# Patient Record
Sex: FEMALE | HISPANIC OR LATINO | Employment: UNEMPLOYED | URBAN - METROPOLITAN AREA
[De-identification: names, ages, dates, MRNs, and addresses within clinical notes are randomized per-mention and may not be internally consistent; named-entity substitution may affect disease eponyms.]

---

## 2017-04-05 DIAGNOSIS — Z01.812 PRE-OPERATIVE LABORATORY EXAMINATION: ICD-10-CM

## 2017-04-05 LAB
ANION GAP SERPL CALC-SCNC: 7 MMOL/L (ref 0–11.9)
BUN SERPL-MCNC: 15 MG/DL (ref 8–22)
CALCIUM SERPL-MCNC: 9.2 MG/DL (ref 8.5–10.5)
CHLORIDE SERPL-SCNC: 105 MMOL/L (ref 96–112)
CO2 SERPL-SCNC: 24 MMOL/L (ref 20–33)
CREAT SERPL-MCNC: 0.74 MG/DL (ref 0.5–1.4)
ERYTHROCYTE [DISTWIDTH] IN BLOOD BY AUTOMATED COUNT: 43.4 FL (ref 35.9–50)
GFR SERPL CREATININE-BSD FRML MDRD: >60 ML/MIN/1.73 M 2
GLUCOSE SERPL-MCNC: 188 MG/DL (ref 65–99)
HCT VFR BLD AUTO: 42.3 % (ref 37–47)
HGB BLD-MCNC: 13.1 G/DL (ref 12–16)
MCH RBC QN AUTO: 25.5 PG (ref 27–33)
MCHC RBC AUTO-ENTMCNC: 31 G/DL (ref 33.6–35)
MCV RBC AUTO: 82.3 FL (ref 81.4–97.8)
PLATELET # BLD AUTO: 231 K/UL (ref 164–446)
PMV BLD AUTO: 11.4 FL (ref 9–12.9)
POTASSIUM SERPL-SCNC: 3.7 MMOL/L (ref 3.6–5.5)
RBC # BLD AUTO: 5.14 M/UL (ref 4.2–5.4)
SODIUM SERPL-SCNC: 136 MMOL/L (ref 135–145)
WBC # BLD AUTO: 8.2 K/UL (ref 4.8–10.8)

## 2017-04-05 PROCEDURE — 80048 BASIC METABOLIC PNL TOTAL CA: CPT

## 2017-04-05 PROCEDURE — 36415 COLL VENOUS BLD VENIPUNCTURE: CPT

## 2017-04-05 PROCEDURE — 85027 COMPLETE CBC AUTOMATED: CPT

## 2017-04-05 RX ORDER — NIFEDIPINE 30 MG/1
30 TABLET, EXTENDED RELEASE ORAL EVERY EVENING
COMMUNITY
End: 2018-02-14

## 2017-04-06 NOTE — H&P
DATE OF SCHEDULED SURGERY:  2017    REASON FOR SURGERY:  Recurrent pelvic floor relaxation, pelvic pain, type 2   stress urinary incontinence as demonstrated by urodynamic studies along with   dyspareunia and type 2 stress urinary incontinence recurrence.    HISTORY OF PRESENT ILLNESS:  This is a 41-year-old woman  5, para 5, 5   previous vaginal deliveries, the largest infant 9 pounds 8 ounces with near   complete uterovaginal prolapse, previously undergone a pelvic floor repair,   now with recurrent pelvic floor relaxation and recurrent stress urinary   incontinence.  Patient postoperatively after her previous procedure had   significant constipation, pelvic pain bearing down and had a recurrence   shortly after surgery.  Expected management and pelvic floor exercises were   encouraged and the patient proceeded forward with this approach for a period   of time; however, now states that she is no longer interested in proceeding   forward with conservative measures.  She has declined pessary therapy, any   other pelvic floor exercises or biofeedback therapy and other conservative   measures, now interested in proceeding forward with attempted definitive   surgical correction with an anterior and posterior vaginal repair, enterocele   repair, perineoplasty, and transobturator tape midurethral sling procedure and   a probable sacrospinous vault suspension.  Risks, benefits, alternatives of   all individual procedures were addressed with the patient in detail over   several visits.  She has asked appropriate questions, signed the appropriate   consents and wishes to proceed forward with surgery as planned.    PAST MEDICAL HISTORY:  Adult onset diabetes mellitus, chronic hypertension,   otherwise as stated above.    PAST SURGICAL HISTORY:  As above.    OBSTETRICAL HISTORY:  Five previous vaginal deliveries between  and .    GYNECOLOGIC HISTORY:  The patient has been amenorrheic since her previous    surgery.  She reports significant pelvic pain, dyspareunia, which she   attributes to large bulge at the vaginal introitus in association with mixed   urinary incontinence symptoms.  She has been counseled regarding risks,   benefits, and alternatives of proceeding forward with the surgery above.  She   does understand her pelvic pain, dyspareunia in addition to her urinary   incontinence, especially the overactive bladder component, maybe unimproved   with surgery as described above, may need additional medical or surgical   management.  Her pelvic pain, dyspareunia may actually worsen with the surgery   as described above.  She is aware of these concerns and is interested in   proceeding forward with surgery nonetheless.    SOCIAL HISTORY:  She is a social drinker.  She denies use of any tobacco.  She   denies use of any other drug use.  She is .  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    MEDICATIONS:  Iron sulfate 325 mg daily, losartan 100 mg p.o. daily, metformin   1000 mg twice daily, omeprazole 20 mg p.o. daily.    ALLERGIES:  No known drug allergies.    PHYSICAL EXAMINATION:  VITAL SIGNS:  She is afebrile, hemodynamically stable.  HEART:  Regular rate and rhythm.  CHEST:  Clear to auscultation bilaterally.  ABDOMEN:  Soft, nondistended, nontender, although the exam was difficult   secondary to the patient's increased body mass index.  PELVIC:  Shows normal external female genitalia.  Grade II anterior and   posterior vaginal relaxation with a grade II apical vaginal relaxation as   well.  She does have tenderness to palpation at the vaginal cuff.  No adnexal   masses or tenderness to palpation were appreciated.  EXTREMITIES:  Nontender.    LABORATORY STUDIES:  Urodynamic studies confirmed type 2 stress urinary   incontinence.    ASSESSMENT AND PLAN:  A 41-year-old woman  5, para 5 with symptomatic   pelvic floor relaxation, dyspareunia, pelvic pain, mixed urinary incontinence   symptoms,  interested in proceeding forward with surgery as described above.    The risks, benefits, alternatives of all individual procedures were addressed   with the patient in detail over several visits.  She has asked appropriate   questions, signed the appropriate consents and is wishing to proceed forward   with surgery as planned.       ____________________________________     MD SUSY OROURKE / LILLIAN    DD:  04/06/2017 08:21:21  DT:  04/06/2017 08:57:18    D#:  [id number]  Job#:  [id number]

## 2017-04-11 ENCOUNTER — HOSPITAL ENCOUNTER (OUTPATIENT)
Facility: MEDICAL CENTER | Age: 42
End: 2017-04-11
Attending: SPECIALIST | Admitting: SPECIALIST
Payer: MEDICAID

## 2017-04-11 VITALS
BODY MASS INDEX: 35.26 KG/M2 | WEIGHT: 211.64 LBS | DIASTOLIC BLOOD PRESSURE: 89 MMHG | SYSTOLIC BLOOD PRESSURE: 148 MMHG | TEMPERATURE: 98.1 F | HEIGHT: 65 IN | RESPIRATION RATE: 16 BRPM | HEART RATE: 67 BPM | OXYGEN SATURATION: 94 %

## 2017-04-11 PROBLEM — N81.6 RECTOCELE: Status: ACTIVE | Noted: 2017-04-11

## 2017-04-11 PROBLEM — N39.3 FEMALE STRESS INCONTINENCE: Status: ACTIVE | Noted: 2017-04-11

## 2017-04-11 PROBLEM — N81.11 CYSTOCELE, MIDLINE: Status: ACTIVE | Noted: 2017-04-11

## 2017-04-11 PROBLEM — R10.2 ADNEXAL TENDERNESS, RIGHT: Status: ACTIVE | Noted: 2017-04-11

## 2017-04-11 LAB — GLUCOSE BLD-MCNC: 112 MG/DL (ref 65–99)

## 2017-04-11 PROCEDURE — 160009 HCHG ANES TIME/MIN: Performed by: SPECIALIST

## 2017-04-11 PROCEDURE — 160002 HCHG RECOVERY MINUTES (STAT): Performed by: SPECIALIST

## 2017-04-11 PROCEDURE — 500892 HCHG PACK, PERI-GYN: Performed by: SPECIALIST

## 2017-04-11 PROCEDURE — 160048 HCHG OR STATISTICAL LEVEL 1-5: Performed by: SPECIALIST

## 2017-04-11 PROCEDURE — A4606 OXYGEN PROBE USED W OXIMETER: HCPCS | Performed by: SPECIALIST

## 2017-04-11 PROCEDURE — 110371 HCHG SHELL REV 272: Performed by: SPECIALIST

## 2017-04-11 PROCEDURE — A9270 NON-COVERED ITEM OR SERVICE: HCPCS

## 2017-04-11 PROCEDURE — 110382 HCHG SHELL REV 271: Performed by: SPECIALIST

## 2017-04-11 PROCEDURE — 501516 HCHG SUTURE, CAPIO: Performed by: SPECIALIST

## 2017-04-11 PROCEDURE — 700101 HCHG RX REV CODE 250

## 2017-04-11 PROCEDURE — 160041 HCHG SURGERY MINUTES - EA ADDL 1 MIN LEVEL 4: Performed by: SPECIALIST

## 2017-04-11 PROCEDURE — 160036 HCHG PACU - EA ADDL 30 MINS PHASE I: Performed by: SPECIALIST

## 2017-04-11 PROCEDURE — 501330 HCHG SET, CYSTO IRRIG TUBING: Performed by: SPECIALIST

## 2017-04-11 PROCEDURE — C1771 REP DEV, URINARY, W/SLING: HCPCS | Performed by: SPECIALIST

## 2017-04-11 PROCEDURE — A4338 INDWELLING CATHETER LATEX: HCPCS | Performed by: SPECIALIST

## 2017-04-11 PROCEDURE — 700102 HCHG RX REV CODE 250 W/ 637 OVERRIDE(OP)

## 2017-04-11 PROCEDURE — 501838 HCHG SUTURE GENERAL: Performed by: SPECIALIST

## 2017-04-11 PROCEDURE — 160035 HCHG PACU - 1ST 60 MINS PHASE I: Performed by: SPECIALIST

## 2017-04-11 PROCEDURE — 700111 HCHG RX REV CODE 636 W/ 250 OVERRIDE (IP)

## 2017-04-11 PROCEDURE — 82962 GLUCOSE BLOOD TEST: CPT

## 2017-04-11 PROCEDURE — 160029 HCHG SURGERY MINUTES - 1ST 30 MINS LEVEL 4: Performed by: SPECIALIST

## 2017-04-11 PROCEDURE — 502240 HCHG MISC OR SUPPLY RC 0272: Performed by: SPECIALIST

## 2017-04-11 DEVICE — SLING TOT OBTRYX I HALO: Type: IMPLANTABLE DEVICE | Status: FUNCTIONAL

## 2017-04-11 RX ORDER — ACETAMINOPHEN 500 MG
1000 TABLET ORAL EVERY 6 HOURS
Status: DISCONTINUED | OUTPATIENT
Start: 2017-04-11 | End: 2017-04-11 | Stop reason: HOSPADM

## 2017-04-11 RX ORDER — BUPIVACAINE HYDROCHLORIDE AND EPINEPHRINE 2.5; 5 MG/ML; UG/ML
INJECTION, SOLUTION INFILTRATION; PERINEURAL
Status: DISCONTINUED | OUTPATIENT
Start: 2017-04-11 | End: 2017-04-11 | Stop reason: HOSPADM

## 2017-04-11 RX ORDER — BUPIVACAINE HYDROCHLORIDE AND EPINEPHRINE 2.5; 5 MG/ML; UG/ML
INJECTION, SOLUTION EPIDURAL; INFILTRATION; INTRACAUDAL; PERINEURAL
Status: DISCONTINUED
Start: 2017-04-11 | End: 2017-04-11 | Stop reason: HOSPADM

## 2017-04-11 RX ORDER — SODIUM CHLORIDE, SODIUM LACTATE, POTASSIUM CHLORIDE, CALCIUM CHLORIDE 600; 310; 30; 20 MG/100ML; MG/100ML; MG/100ML; MG/100ML
1000 INJECTION, SOLUTION INTRAVENOUS
Status: COMPLETED | OUTPATIENT
Start: 2017-04-11 | End: 2017-04-11

## 2017-04-11 RX ORDER — SIMETHICONE 80 MG
80 TABLET,CHEWABLE ORAL EVERY 8 HOURS PRN
Status: DISCONTINUED | OUTPATIENT
Start: 2017-04-11 | End: 2017-04-11 | Stop reason: HOSPADM

## 2017-04-11 RX ORDER — ONDANSETRON 2 MG/ML
4 INJECTION INTRAMUSCULAR; INTRAVENOUS EVERY 6 HOURS PRN
Status: DISCONTINUED | OUTPATIENT
Start: 2017-04-11 | End: 2017-04-11 | Stop reason: HOSPADM

## 2017-04-11 RX ORDER — OXYCODONE HYDROCHLORIDE 5 MG/1
10 TABLET ORAL
Status: DISCONTINUED | OUTPATIENT
Start: 2017-04-11 | End: 2017-04-11 | Stop reason: HOSPADM

## 2017-04-11 RX ORDER — METOCLOPRAMIDE HYDROCHLORIDE 5 MG/ML
10 INJECTION INTRAMUSCULAR; INTRAVENOUS EVERY 4 HOURS PRN
Status: DISCONTINUED | OUTPATIENT
Start: 2017-04-11 | End: 2017-04-11 | Stop reason: HOSPADM

## 2017-04-11 RX ORDER — OXYCODONE HCL 5 MG/5 ML
SOLUTION, ORAL ORAL
Status: COMPLETED
Start: 2017-04-11 | End: 2017-04-11

## 2017-04-11 RX ADMIN — OXYCODONE HYDROCHLORIDE 10 MG: 5 SOLUTION ORAL at 14:29

## 2017-04-11 RX ADMIN — SODIUM CHLORIDE, SODIUM LACTATE, POTASSIUM CHLORIDE, CALCIUM CHLORIDE 1000 ML: 600; 310; 30; 20 INJECTION, SOLUTION INTRAVENOUS at 11:50

## 2017-04-11 ASSESSMENT — PAIN SCALES - GENERAL
PAINLEVEL_OUTOF10: 0
PAINLEVEL_OUTOF10: 3
PAINLEVEL_OUTOF10: 2
PAINLEVEL_OUTOF10: 0
PAINLEVEL_OUTOF10: 2
PAINLEVEL_OUTOF10: 0
PAINLEVEL_OUTOF10: 0

## 2017-04-11 NOTE — DISCHARGE INSTRUCTIONS
ACTIVITY: Rest and take it easy for the first 24 hours.  A responsible adult is recommended to remain with you during that time.  It is normal to feel sleepy.  We encourage you to not do anything that requires balance, judgment or coordination.    MILD FLU-LIKE SYMPTOMS ARE NORMAL. YOU MAY EXPERIENCE GENERALIZED MUSCLE ACHES, THROAT IRRITATION, HEADACHE AND/OR SOME NAUSEA.    FOR 24 HOURS DO NOT:  Drive, operate machinery or run household appliances.  Drink beer or alcoholic beverages.   Make important decisions or sign legal documents.    SPECIAL INSTRUCTIONS: pelvic rest (no douching, sexual intercourse or tampons) until cleared by MD.    DIET: To avoid nausea, slowly advance diet as tolerated, avoiding spicy or greasy foods for the first day.  Add more substantial food to your diet according to your physician's instructions.  Babies can be fed formula or breast milk as soon as they are hungry.  INCREASE FLUIDS AND FIBER TO AVOID CONSTIPATION.    SURGICAL DRESSING/BATHING: may shower tomorrow, no hot tubs, baths or swimming.     FOLLOW-UP APPOINTMENT:  A follow-up appointment should be arranged with your doctor, call to schedule.    You should CALL YOUR PHYSICIAN if you develop:  Fever greater than 101 degrees F.  Pain not relieved by medication, or persistent nausea or vomiting.  Excessive bleeding (blood soaking through dressing) or unexpected drainage from the wound.  Extreme redness or swelling around the incision site, drainage of pus or foul smelling drainage.  Inability to urinate or empty your bladder within 8 hours.  Problems with breathing or chest pain.    You should call 911 if you develop problems with breathing or chest pain.  If you are unable to contact your doctor or surgical center, you should go to the nearest emergency room or urgent care center.  Physician's telephone #: [phone]    If any questions arise, call your doctor.  If your doctor is not available, please feel free to call the  Surgical Center at [phone].  The Center is open Monday through Friday from 7AM to 7PM.  You can also call the HEALTH HOTLINE open 24 hours/day, 7 days/week and speak to a nurse at [phone], or toll free at [phone].    A registered nurse may call you a few days after your surgery to see how you are doing after your procedure.    MEDICATIONS: Resume taking daily medication.  Take prescribed pain medication with food.  If no medication is prescribed, you may take non-aspirin pain medication if needed.  PAIN MEDICATION CAN BE VERY CONSTIPATING.  Take a stool softener or laxative such as senokot, pericolace, or milk of magnesia if needed.    Prescription given for has a home.  Last pain medication given at 2:30.  Next dose due tonight at 6:30 if needed.    If your physician has prescribed pain medication that includes Acetaminophen (Tylenol), do not take additional Acetaminophen (Tylenol) while taking the prescribed medication.    Depression / Suicide Risk    As you are discharged from this [facility] facility, it is important to learn how to keep safe from harming yourself.    Recognize the warning signs:  · Abrupt changes in personality, positive or negative- including increase in energy   · Giving away possessions  · Change in eating patterns- significant weight changes-  positive or negative  · Change in sleeping patterns- unable to sleep or sleeping all the time   · Unwillingness or inability to communicate  · Depression  · Unusual sadness, discouragement and loneliness  · Talk of wanting to die  · Neglect of personal appearance   · Rebelliousness- reckless behavior  · Withdrawal from people/activities they love  · Confusion- inability to concentrate     If you or a loved one observes any of these behaviors or has concerns about self-harm, here's what you can do:  · Talk about it- your feelings and reasons for harming yourself  · Remove any means that you might use to hurt yourself (examples:  pills, rope, extension cords, firearm)  · Get professional help from the community (Mental Health, Substance Abuse, psychological counseling)  · Do not be alone:Call your Safe Contact- someone whom you trust who will be there for you.  · Call your local CRISIS HOTLINE 978-6487 or 243-317-7696  · Call your local Children's Mobile Crisis Response Team [place] (481) 848-1272 or www.[employer]  · Call the toll free National Suicide Prevention Hotlines   · National Suicide Prevention Lifeline 161-857-STYQ (7768)  · National Hope Line Network 800-SUICIDE (837-5902)

## 2017-04-11 NOTE — OP REPORT
DATE OF SERVICE:  4/11/2017     PREOPERATIVE DIAGNOSES:  Recurrent symptomatic pelvic floor relaxation and type II stress urinary incontinence with pelvic pain and dyspareunia which she attributes to her pelvic floor relaxation     POSTOPERATIVE DIAGNOSES: same     PROCEDURES PERFORMED:  Anterior and posterior vaginal repair, enterocele    repair, sacrospinous vault suspension, transobturator tape midurethral sling    procedure, cystoscopy.     SURGEON:  Angel Hernadez MD     ASSISTANT:  Tim Matt MD     ANESTHESIA:  General     ANESTHESIOLOGIST:  Anesthesiologist: Jere Basilio M.D.     ESTIMATED BLOOD LOSS FOR THE PROCEDURE:  less than 15 mL.     FINDINGS:  Good support of the anterior and posterior vaginal walls in    addition to the apical vaginal tissue without any undue tension in the suture    sites.  Cystoscopy revealed bilateral ureteral efflux and normal bladder and    no undue tension noted at the level of the mid urethra after sling placement    on cystoscopic evaluation.     DESCRIPTION OF PROCEDURE:  The patient was taken to the operating room where    general anesthesia was performed without difficulty.  Patient was then prepped   and draped in usual sterile fashion.  Lower extremities placed in Michael    stirrups.  Attention was first turned to the perineum where a weighted    speculum was placed with the use of Bardales retractor.  Anterior vaginal mucosa    was then injected with 10 mL of 0.25% Marcaine with epinephrine.  The vaginal    mucosa was then dissected off the underlying pubocervical fascia candis until    the levator muscles were then reached.  Her previous transobturator tape sling   was resected followed by placement of horizontal mattress sutures    reapproximating the pubocervical fascia in midline in a double layer closure,    thereby reducing the midline cystocele followed by injection of 10 mL of 0.25%   Marcaine with epinephrine lateral to the clitoris in the labial crural folds     followed by stab incision made with the use of 11 blade scalpel in this    location on each side followed by placement of the respective Obtryx halo    needle through the labial crural incision sites to the obturator membranes    through the endopelvic fascia out through the vaginal mucosal incision at the    level of the mid urethra.  The sling was then applied to the Obtryx halo    needle.  Needle was then taken back up through the original tract.  Tensioning   of position was then performed.  Dumont catheter was removed, which had been    placed at the beginning of the case for placement of a 30-degree cystoscope,    which revealed normal urinary bladder, bilateral ureteral efflux and no undue    tension noted at the level of the mid urethra.  The sling was then released,    tensioning of position was then finalized under direct cystoscopic evaluation.    Cystoscope removed.  Dumont catheter replaced.  All excess vaginal mucosa and   sling material were then excised.  The vaginal mucosa was then reapproximated   with 2-0 Vicryl in a running interlocking fashion in one segment.  Posterior    vaginal mucosa was then injected with 10 mL of 0.25% Marcaine with    epinephrine.  The vaginal mucosa was then dissected off the underlying    rectovaginal fascia candis until the levator muscles were then reached.     Horizontal mattress sutures were then used to reapproximate the rectovaginal    fascia in the midline in a double 0 closure, thereby reducing the midline    rectocele.  A large enterocele was located at the superior aspect of the    dissection.  Dissection of the sacrospinous process was then performed in the    ischial spine, 3 cm medial along the sacrospinous ligament with straight    catheterization needle device, 0 Ethibond suture was taken through the    sacrospinous ligament taken out through the right apical portion of the    vaginal cuff and the overlying vaginal mucosa.  Once tied down, there was  good   reapproximation of the apex of the vaginal vault to the sacrospinous    ligament.  The rectovaginal septum was then reapproximated in the posterior    aspect of the vaginal cuff in a double pursestring suture, thereby reducing    the large enterocele located at the superior aspect of the dissection.  All    excess vaginal mucosa was then trimmed.  The vaginal mucosa was then    reapproximated with 2-0 Vicryl in running locking fashion in one segment    performing a perineoplasty on the perineum.  The patient tolerated procedure    well and was awoken from general anesthesia, was taken to the recovery in    stable condition.        ____________________________________     ISIDORO MURILLO MD

## 2017-04-11 NOTE — OR NURSING
1416  report received from Dr. Basilio. respirations spontaneous and non-labored.To PACU from OR via gurney, sagsatume catheter to DD, clear yellow output, marlee pad in place, scant bloody drainage.     1429: patient complains of minimal pain. Tolerated po oxycodone and sips of water.    1432: patients daughter to recovery room.    1450: patient resting comfortably, No change in surgical site assessment. Tolerating po water.    1505: discontinued sagastume catheter without incident. Bladder challenge 300 sterile water, patient voided 300ml pink urine without incident. Minimal blood on peripad. Patient denies pain or nausea. Assisted with dressing and into chair.    1530: patient had slight nausea which was relieved with quezease and crackers. Discharge instructions reviewed with patient and her daughter. Patient steady gait to exit with all belongings.

## 2017-04-11 NOTE — IP AVS SNAPSHOT
4/11/2017          Tanya Mahmood  [address] [place] [address]    Dear Tanya:    [facility] wants to ensure your discharge home is safe and you or your loved ones have had all your questions answered regarding your care after you leave the hospital.    You may receive a telephone call within two days of your discharge.  This call is to make certain you understand your discharge instructions as well as ensure we provided you with the best care possible during your stay with us.     The call will only last approximately 3-5 minutes and will be done by a nurse.    Once again, we want to ensure your discharge home is safe and that you have a clear understanding of any next steps in your care.  If you have any questions or concerns, please do not hesitate to contact us, we are here for you.  Thank you for choosing [facility] for your healthcare needs.    Sincerely,    Jefe Peña    [facility]

## 2017-04-11 NOTE — OR SURGEON
Immediate Post-Operative Note      PreOp Diagnosis: Recurrent symptomatic pelvic floor relaxation and type II stress urinary incontinence with pelvic pain and dyspareunia which she attributes to her pelvic floor relaxation    PostOp Diagnosis: same    Procedure(s):  ANTERIOR AND POSTERIOR REPAIR - Wound Class: Clean Contaminated  ENTEROCELE REPAIR- PERINEOPLASTY - Wound Class: Clean Contaminated  BLADDER SLING FEMALE-TOT - Wound Class: Clean Contaminated  VAGINAL SUSPENSION-SACROSPINOUS VAULT - Wound Class: Clean Contaminated    Surgeon(s):  CHARI Norris M.D.    Anesthesiologist/Type of Anesthesia:  Anesthesiologist: Jree Basilio M.D./General    Surgical Staff:  Circulator: Ashley Gaytan R.N.  Relief Scrub: Vandana Mathur  Scrub Person: Danette Gonzalez    Specimen: none    Estimated Blood Loss: less than 15ccs    Findings: Good support of the anterior and posterior and apical vaginal tissue without any undue tension at any suture sites, cystoscopy revealed bilateral ureteral efflux, normal bladder and no undue tension at the mid urethra after sling placement on cystoscopy    Complications: none        4/11/2017 2:29 PM Angel Hernadez

## 2017-04-11 NOTE — IP AVS SNAPSHOT
" Home Care Instructions                                                                                                                Name:Tanya Mahmood  Medical Record Number:[id number]  CSN: [id number]    YOB: 1975   Age: 41 y.o.  Sex: female  HT:1.651 m (5' 5\") WT: 96 kg (211 lb 10.3 oz)          Admit Date: 4/11/2017     Discharge Date:   Today's Date: 4/11/2017  Attending Doctor:  Angel Hernadez M.D.                  Allergies:  Metformin                Discharge Instructions         ACTIVITY: Rest and take it easy for the first 24 hours.  A responsible adult is recommended to remain with you during that time.  It is normal to feel sleepy.  We encourage you to not do anything that requires balance, judgment or coordination.    MILD FLU-LIKE SYMPTOMS ARE NORMAL. YOU MAY EXPERIENCE GENERALIZED MUSCLE ACHES, THROAT IRRITATION, HEADACHE AND/OR SOME NAUSEA.    FOR 24 HOURS DO NOT:  Drive, operate machinery or run household appliances.  Drink beer or alcoholic beverages.   Make important decisions or sign legal documents.    SPECIAL INSTRUCTIONS: pelvic rest (no douching, sexual intercourse or tampons) until cleared by MD.    DIET: To avoid nausea, slowly advance diet as tolerated, avoiding spicy or greasy foods for the first day.  Add more substantial food to your diet according to your physician's instructions.  Babies can be fed formula or breast milk as soon as they are hungry.  INCREASE FLUIDS AND FIBER TO AVOID CONSTIPATION.    SURGICAL DRESSING/BATHING: may shower tomorrow, no hot tubs, baths or swimming.     FOLLOW-UP APPOINTMENT:  A follow-up appointment should be arranged with your doctor, call to schedule.    You should CALL YOUR PHYSICIAN if you develop:  Fever greater than 101 degrees F.  Pain not relieved by medication, or persistent nausea or vomiting.  Excessive bleeding (blood soaking through dressing) or unexpected drainage from the wound.  Extreme redness or swelling around " the incision site, drainage of pus or foul smelling drainage.  Inability to urinate or empty your bladder within 8 hours.  Problems with breathing or chest pain.    You should call 911 if you develop problems with breathing or chest pain.  If you are unable to contact your doctor or surgical center, you should go to the nearest emergency room or urgent care center.  Physician's telephone #: [phone]    If any questions arise, call your doctor.  If your doctor is not available, please feel free to call the Surgical Center at [phone].  The Center is open Monday through Friday from 7AM to 7PM.  You can also call the HEALTH HOTLINE open 24 hours/day, 7 days/week and speak to a nurse at [phone], or toll free at [phone].    A registered nurse may call you a few days after your surgery to see how you are doing after your procedure.    MEDICATIONS: Resume taking daily medication.  Take prescribed pain medication with food.  If no medication is prescribed, you may take non-aspirin pain medication if needed.  PAIN MEDICATION CAN BE VERY CONSTIPATING.  Take a stool softener or laxative such as senokot, pericolace, or milk of magnesia if needed.    Prescription given for has a home.  Last pain medication given at 2:30.  Next dose due tonight at 6:30 if needed.    If your physician has prescribed pain medication that includes Acetaminophen (Tylenol), do not take additional Acetaminophen (Tylenol) while taking the prescribed medication.    Depression / Suicide Risk    As you are discharged from this [facility] facility, it is important to learn how to keep safe from harming yourself.    Recognize the warning signs:  · Abrupt changes in personality, positive or negative- including increase in energy   · Giving away possessions  · Change in eating patterns- significant weight changes-  positive or negative  · Change in sleeping patterns- unable to sleep or sleeping all the time   · Unwillingness or inability  to communicate  · Depression  · Unusual sadness, discouragement and loneliness  · Talk of wanting to die  · Neglect of personal appearance   · Rebelliousness- reckless behavior  · Withdrawal from people/activities they love  · Confusion- inability to concentrate     If you or a loved one observes any of these behaviors or has concerns about self-harm, here's what you can do:  · Talk about it- your feelings and reasons for harming yourself  · Remove any means that you might use to hurt yourself (examples: pills, rope, extension cords, firearm)  · Get professional help from the community (Mental Health, Substance Abuse, psychological counseling)  · Do not be alone:Call your Safe Contact- someone whom you trust who will be there for you.  · Call your local CRISIS HOTLINE 766-7612 or 499-471-7646  · Call your local Children's Mobile Crisis Response Team [place] (516) 807-3130 or www.[employer]  · Call the toll free National Suicide Prevention Hotlines   · National Suicide Prevention Lifeline 690-288-MCEH (6075)  · [employer] Hope Line Network 800-SUICIDE (657-5160)       Medication List      CONTINUE taking these medications        Instructions    aspirin EC 81 MG Tbec   Commonly known as:  ECOTRIN    Take 81 mg by mouth every day.   Dose:  81 mg       FIBER PO    Take  by mouth every day.       glimepiride 4 MG Tabs   Commonly known as:  AMARYL    Take 4 mg by mouth every morning.   Dose:  4 mg       hydrochlorothiazide 12.5 MG tablet   Commonly known as:  HYDRODIURIL    Take 12.5 mg by mouth every day.   Dose:  12.5 mg       INVOKANA 300 MG Tabs   Generic drug:  Canagliflozin    Take 300 mg by mouth every day. Indications: Type 2 Diabetes   Dose:  300 mg       losartan 25 MG Tabs   Commonly known as:  COZAAR    Take 100 mg by mouth every day.   Dose:  100 mg       NIFEDICAL XL 30 MG tablet   Generic drug:  NIFEdipine SR    Take 30 mg by mouth every evening. Indications: High Blood Pressure   Dose:  30 mg         omeprazole 20 MG delayed-release capsule   Commonly known as:  PRILOSEC    Take 20 mg by mouth as needed.   Dose:  20 mg               Medication Information     Above and/or attached are the medications your physician expects you to take upon discharge. Review all of your home medications and newly ordered medications with your doctor and/or pharmacist. Follow medication instructions as directed by your doctor and/or pharmacist. Please keep your medication list with you and share with your physician. Update the information when medications are discontinued, doses are changed, or new medications (including over-the-counter products) are added; and carry medication information at all times in the event of emergency situations.        Resources     Quit Smoking / Tobacco Use:    I understand the use of any tobacco products increases my chance of suffering from future heart disease or stroke and could cause other illnesses which may shorten my life. Quitting the use of tobacco products is the single most important thing I can do to improve my health. For further information on smoking / tobacco cessation call a Toll Free Quit Line at 1-107.678.1418 (*[facility]) or 1-281.281.3366 (American Lung Association) or you can access the web based program at www.lungusa.org.    [place] Tobacco Users Help Line:  (209) 339-2974       Toll Free: 1-843.432.3039  Quit Tobacco Program [facility] (836)052-0759    Crisis Hotline:    [place] Crisis Hotline:  6-104-AHLWREL or 1-960.626.9365    [place] Crisis Hotline:    1-295.325.6323 or 358-768-8679    Discharge Survey:   Thank you for choosing [facility]. We hope we did everything we could to make your hospital stay a pleasant one. You may be receiving a survey and we would appreciate your time and participation in answering the questions. Your input is very valuable to us in our efforts to improve our service to our patients and their  families.            Signatures     My signature on this form indicates that:    1. I acknowledge receipt and understanding of these Home Care Instruction.  2. My questions regarding this information have been answered to my satisfaction.  3. I have formulated a plan with my discharge nurse to obtain my prescribed medications for home.    __________________________________      __________________________________                   Patient Signature                                 Guardian/Responsible Adult Signature      __________________________________                 __________       ________                       Nurse Signature                                               Date                 Time

## 2017-10-05 NOTE — H&P
REASON FOR SURGERY:  Recurrent pelvic floor relaxation, pelvic pain,   dyspareunia in addition to stress urinary incontinence, unrelieved with a   transobturator tape midurethral sling procedure.    HISTORY OF PRESENT ILLNESS:  This is a 42-year-old woman,  5, para 5,   with 5 previous vaginal deliveries, the largest infant 9 pounds 8 ounces with   near complete uterovaginal prolapse, previously undergone a pelvic floor   procedure with a subsequent attempt at correction of a recurrent defect.  The   patient postoperatively continues to have issues with regards to recurrent   pelvic floor relaxation, specifically anterior and apical vaginal tissue with   a smaller defect of the posterior vaginal tissue.  The patient has been   counseled regarding procedures and is now interested in proceeding forward   with correction of her recurrent pelvic floor relaxation with a TVT procedure   to address her recurrent urinary incontinence unrelieved with the   transobturator tape mid urethral sling procedure in addition to a probable   mesh in the anterior vaginal wall extending up to the vaginal apex with a   native tissue repair of the posterior vaginal wall, enterocele defect and a   sacrospinous wall suspension.  The patient has been counseled regarding each   of these individual procedures.  She has attempted conservative management   with pelvic floor exercises.  She states that she is not interested in   proceeding forward with any other conservative management and is now   interested in proceeding forward with attempted definitive surgical correction   with the surgery as described above.  Risks, benefits and alternatives of all   individual portions of the procedure were addressed with the patient in   detail over several visits.  She has asked appropriate questions, signed the   appropriate consents and is wishing to proceed forward with surgery as   planned.  She does understand her pelvic pain, dyspareunia may  be unimproved   or worsened with the surgery as described above.  She also states that she   understands the risks of proceeding forward with a TVT versus a TOT sling   procedure with increased risks and concerns of voiding dysfunction   postoperatively as well as the risks of using increasing mesh in the pelvis to   assist with her pelvic floor relaxation including all possible postoperative   complications and issues associated with the use of mesh.  She wishes to   proceed forward with the surgery nonetheless even after extensive counseling   done on her preoperative visit today.    PAST MEDICAL HISTORY:  Adult onset diabetes mellitus, chronic hypertension,   otherwise as stated above.    PAST SURGICAL HISTORY:  As above.    OBSTETRICAL HISTORY:  Five previous deliveries between 1990 and 2010.    GYNECOLOGIC HISTORY:  The patient has been amenorrheic since her hysterectomy.    She does report continued pelvic pain, dyspareunia which she attributes to   the recurrent large bulge at the vaginal introitus, associated mixed urinary   incontinence symptoms.  She also understands the risks of proceeding forward   with addressing the stress incontinence; however, given her mixed symptoms,   she may continue to have urinary incontinence associated with overactive   bladder and detrusor instability which may need further workup, medical   management or possible surgical therapy.    SOCIAL HISTORY:  She is a social drinker.  She denies use of any tobacco.  She   denies use of any other drug use.  She is .  She works in a [employer]   house.    MEDICATIONS:  Losartan 100 mg p.o. daily, metformin 1000 mg twice daily,   omeprazole 20 mg p.o. daily.    ALLERGIES:  No known drug allergies.    PHYSICAL EXAMINATION:  VITAL SIGNS:  She is afebrile, hemodynamically stable.  HEART:  Regular rate and rhythm.  CHEST:  Clear to auscultation bilaterally.  ABDOMEN:  Soft, obese, nontender.  PELVIC:  Shows grade II anterior,  posterior and apical vaginal relaxation with   tenderness to palpation at the vaginal cuff.  No adnexal masses or tenderness   to palpation were appreciated in either adnexal regions.  Rectovaginal exam   confirms the above.  She is guaiac negative.  EXTREMITIES:  Nontender.      Urodynamic studies did demonstrate type 2 stress urinary incontinence.  She   has undergone an ultrasound previously which demonstrated a 1.5 cm cyst in the   right ovary, otherwise unremarkable pelvis.    ASSESSMENT AND PLAN:  A 42-year-old woman  5, para 5, recurrent   symptomatic pelvic floor relaxation, pelvic pain, dyspareunia, mixed urinary   incontinence symptoms now interested in proceeding forward with surgery as   described above.  The risks, benefits and alternatives were addressed with the   patient in detail.  She has asked appropriate questions, signed the   appropriate consents, and wished to proceed forward with surgery as planned.       ____________________________________     MD SUSY OROURKE / LILLIAN    DD:  10/05/2017 08:11:18  DT:  10/05/2017 09:32:11    D#:  [id number]  Job#:  [id number]

## 2017-10-16 DIAGNOSIS — Z01.810 PRE-OPERATIVE CARDIOVASCULAR EXAMINATION: ICD-10-CM

## 2017-10-16 DIAGNOSIS — Z01.812 PRE-OPERATIVE LABORATORY EXAMINATION: ICD-10-CM

## 2017-10-16 LAB
ANION GAP SERPL CALC-SCNC: 7 MMOL/L (ref 0–11.9)
BUN SERPL-MCNC: 19 MG/DL (ref 8–22)
CALCIUM SERPL-MCNC: 9.2 MG/DL (ref 8.5–10.5)
CHLORIDE SERPL-SCNC: 103 MMOL/L (ref 96–112)
CO2 SERPL-SCNC: 26 MMOL/L (ref 20–33)
CREAT SERPL-MCNC: 0.49 MG/DL (ref 0.5–1.4)
ERYTHROCYTE [DISTWIDTH] IN BLOOD BY AUTOMATED COUNT: 41.5 FL (ref 35.9–50)
EST. AVERAGE GLUCOSE BLD GHB EST-MCNC: 217 MG/DL
GFR SERPL CREATININE-BSD FRML MDRD: >60 ML/MIN/1.73 M 2
GLUCOSE SERPL-MCNC: 188 MG/DL (ref 65–99)
HBA1C MFR BLD: 9.2 % (ref 0–5.6)
HCT VFR BLD AUTO: 43.4 % (ref 37–47)
HGB BLD-MCNC: 14.4 G/DL (ref 12–16)
MCH RBC QN AUTO: 28.7 PG (ref 27–33)
MCHC RBC AUTO-ENTMCNC: 33.2 G/DL (ref 33.6–35)
MCV RBC AUTO: 86.6 FL (ref 81.4–97.8)
PLATELET # BLD AUTO: 217 K/UL (ref 164–446)
PMV BLD AUTO: 11.4 FL (ref 9–12.9)
POTASSIUM SERPL-SCNC: 3.9 MMOL/L (ref 3.6–5.5)
RBC # BLD AUTO: 5.01 M/UL (ref 4.2–5.4)
SODIUM SERPL-SCNC: 136 MMOL/L (ref 135–145)
WBC # BLD AUTO: 8.6 K/UL (ref 4.8–10.8)

## 2017-10-16 PROCEDURE — 83036 HEMOGLOBIN GLYCOSYLATED A1C: CPT

## 2017-10-16 PROCEDURE — 93005 ELECTROCARDIOGRAM TRACING: CPT

## 2017-10-16 PROCEDURE — 36415 COLL VENOUS BLD VENIPUNCTURE: CPT

## 2017-10-16 PROCEDURE — 93010 ELECTROCARDIOGRAM REPORT: CPT | Performed by: INTERNAL MEDICINE

## 2017-10-16 PROCEDURE — 85027 COMPLETE CBC AUTOMATED: CPT

## 2017-10-16 PROCEDURE — 80048 BASIC METABOLIC PNL TOTAL CA: CPT

## 2017-10-16 RX ORDER — LOSARTAN POTASSIUM 100 MG/1
100 TABLET ORAL EVERY MORNING
COMMUNITY
End: 2018-02-14

## 2017-10-16 RX ORDER — BUPROPION HYDROCHLORIDE 150 MG/1
150 TABLET ORAL
COMMUNITY
End: 2018-09-24

## 2017-10-17 ENCOUNTER — HOSPITAL ENCOUNTER (OUTPATIENT)
Facility: MEDICAL CENTER | Age: 42
End: 2017-10-17
Attending: SPECIALIST | Admitting: SPECIALIST
Payer: MEDICAID

## 2017-10-17 VITALS
SYSTOLIC BLOOD PRESSURE: 136 MMHG | HEART RATE: 79 BPM | OXYGEN SATURATION: 96 % | WEIGHT: 207.67 LBS | RESPIRATION RATE: 16 BRPM | BODY MASS INDEX: 34.6 KG/M2 | TEMPERATURE: 97.9 F | DIASTOLIC BLOOD PRESSURE: 89 MMHG | HEIGHT: 65 IN

## 2017-10-17 PROBLEM — N39.3 URINARY, INCONTINENCE, STRESS FEMALE: Status: ACTIVE | Noted: 2017-10-17

## 2017-10-17 LAB
EKG IMPRESSION: NORMAL
GLUCOSE BLD-MCNC: 104 MG/DL (ref 65–99)

## 2017-10-17 PROCEDURE — 502240 HCHG MISC OR SUPPLY RC 0272: Performed by: SPECIALIST

## 2017-10-17 PROCEDURE — C1713 ANCHOR/SCREW BN/BN,TIS/BN: HCPCS | Performed by: SPECIALIST

## 2017-10-17 PROCEDURE — C1771 REP DEV, URINARY, W/SLING: HCPCS | Performed by: SPECIALIST

## 2017-10-17 PROCEDURE — A9270 NON-COVERED ITEM OR SERVICE: HCPCS

## 2017-10-17 PROCEDURE — 160009 HCHG ANES TIME/MIN: Performed by: SPECIALIST

## 2017-10-17 PROCEDURE — 160036 HCHG PACU - EA ADDL 30 MINS PHASE I: Performed by: SPECIALIST

## 2017-10-17 PROCEDURE — 500509 HCHG ELECTRODE, ROLLER: Performed by: SPECIALIST

## 2017-10-17 PROCEDURE — 700111 HCHG RX REV CODE 636 W/ 250 OVERRIDE (IP)

## 2017-10-17 PROCEDURE — 82962 GLUCOSE BLOOD TEST: CPT

## 2017-10-17 PROCEDURE — 500865 HCHG NEEDLE, SPINAL 20G/22G: Performed by: SPECIALIST

## 2017-10-17 PROCEDURE — 700102 HCHG RX REV CODE 250 W/ 637 OVERRIDE(OP)

## 2017-10-17 PROCEDURE — 160041 HCHG SURGERY MINUTES - EA ADDL 1 MIN LEVEL 4: Performed by: SPECIALIST

## 2017-10-17 PROCEDURE — 501330 HCHG SET, CYSTO IRRIG TUBING: Performed by: SPECIALIST

## 2017-10-17 PROCEDURE — 160035 HCHG PACU - 1ST 60 MINS PHASE I: Performed by: SPECIALIST

## 2017-10-17 PROCEDURE — 160048 HCHG OR STATISTICAL LEVEL 1-5: Performed by: SPECIALIST

## 2017-10-17 PROCEDURE — 700101 HCHG RX REV CODE 250

## 2017-10-17 PROCEDURE — 501838 HCHG SUTURE GENERAL: Performed by: SPECIALIST

## 2017-10-17 PROCEDURE — 160029 HCHG SURGERY MINUTES - 1ST 30 MINS LEVEL 4: Performed by: SPECIALIST

## 2017-10-17 PROCEDURE — 502000 HCHG MISC OR IMPLANTS RC 0278: Performed by: SPECIALIST

## 2017-10-17 PROCEDURE — 501516 HCHG SUTURE, CAPIO: Performed by: SPECIALIST

## 2017-10-17 PROCEDURE — A4338 INDWELLING CATHETER LATEX: HCPCS | Performed by: SPECIALIST

## 2017-10-17 PROCEDURE — 500892 HCHG PACK, PERI-GYN: Performed by: SPECIALIST

## 2017-10-17 PROCEDURE — 160002 HCHG RECOVERY MINUTES (STAT): Performed by: SPECIALIST

## 2017-10-17 DEVICE — SLING TOT OBTRYX I HALO: Type: IMPLANTABLE DEVICE | Site: VAGINA | Status: FUNCTIONAL

## 2017-10-17 DEVICE — IMPLANTABLE DEVICE: Type: IMPLANTABLE DEVICE | Site: VAGINA | Status: FUNCTIONAL

## 2017-10-17 RX ORDER — ACETAMINOPHEN 500 MG
TABLET ORAL
Status: COMPLETED
Start: 2017-10-17 | End: 2017-10-17

## 2017-10-17 RX ORDER — BUPIVACAINE HYDROCHLORIDE AND EPINEPHRINE 2.5; 5 MG/ML; UG/ML
INJECTION, SOLUTION INFILTRATION; PERINEURAL
Status: DISCONTINUED | OUTPATIENT
Start: 2017-10-17 | End: 2017-10-17 | Stop reason: HOSPADM

## 2017-10-17 RX ORDER — SCOLOPAMINE TRANSDERMAL SYSTEM 1 MG/1
PATCH, EXTENDED RELEASE TRANSDERMAL
Status: COMPLETED
Start: 2017-10-17 | End: 2017-10-17

## 2017-10-17 RX ORDER — OXYCODONE HYDROCHLORIDE 5 MG/1
10 TABLET ORAL
Status: DISCONTINUED | OUTPATIENT
Start: 2017-10-17 | End: 2017-10-17 | Stop reason: HOSPADM

## 2017-10-17 RX ORDER — ACETAMINOPHEN 500 MG
1000 TABLET ORAL EVERY 6 HOURS
Status: DISCONTINUED | OUTPATIENT
Start: 2017-10-17 | End: 2017-10-17 | Stop reason: HOSPADM

## 2017-10-17 RX ORDER — SIMETHICONE 80 MG
80 TABLET,CHEWABLE ORAL EVERY 8 HOURS PRN
Status: DISCONTINUED | OUTPATIENT
Start: 2017-10-17 | End: 2017-10-17 | Stop reason: HOSPADM

## 2017-10-17 RX ORDER — HALOPERIDOL 5 MG/ML
INJECTION INTRAMUSCULAR
Status: COMPLETED
Start: 2017-10-17 | End: 2017-10-17

## 2017-10-17 RX ORDER — SODIUM CHLORIDE, SODIUM LACTATE, POTASSIUM CHLORIDE, CALCIUM CHLORIDE 600; 310; 30; 20 MG/100ML; MG/100ML; MG/100ML; MG/100ML
INJECTION, SOLUTION INTRAVENOUS CONTINUOUS
Status: DISCONTINUED | OUTPATIENT
Start: 2017-10-17 | End: 2017-10-17 | Stop reason: HOSPADM

## 2017-10-17 RX ORDER — METOCLOPRAMIDE HYDROCHLORIDE 5 MG/ML
10 INJECTION INTRAMUSCULAR; INTRAVENOUS EVERY 4 HOURS PRN
Status: DISCONTINUED | OUTPATIENT
Start: 2017-10-17 | End: 2017-10-17 | Stop reason: HOSPADM

## 2017-10-17 RX ORDER — ONDANSETRON 2 MG/ML
4 INJECTION INTRAMUSCULAR; INTRAVENOUS EVERY 6 HOURS PRN
Status: DISCONTINUED | OUTPATIENT
Start: 2017-10-17 | End: 2017-10-17 | Stop reason: HOSPADM

## 2017-10-17 RX ORDER — GABAPENTIN 300 MG/1
CAPSULE ORAL
Status: COMPLETED
Start: 2017-10-17 | End: 2017-10-17

## 2017-10-17 RX ADMIN — GABAPENTIN 300 MG: 300 CAPSULE ORAL at 14:00

## 2017-10-17 RX ADMIN — SODIUM CHLORIDE, SODIUM LACTATE, POTASSIUM CHLORIDE, CALCIUM CHLORIDE 1000 ML: 600; 310; 30; 20 INJECTION, SOLUTION INTRAVENOUS at 12:15

## 2017-10-17 RX ADMIN — HALOPERIDOL LACTATE 1 MG: 5 INJECTION, SOLUTION INTRAMUSCULAR at 16:30

## 2017-10-17 RX ADMIN — SCOPALAMINE 1 PATCH: 1 PATCH, EXTENDED RELEASE TRANSDERMAL at 14:00

## 2017-10-17 RX ADMIN — ACETAMINOPHEN 1000 MG: 500 TABLET, FILM COATED ORAL at 14:00

## 2017-10-17 ASSESSMENT — PAIN SCALES - GENERAL
PAINLEVEL_OUTOF10: 0

## 2017-10-17 NOTE — OR NURSING
RECEIVED FROM OR WITH DR DUNHAM.  PATIENT SLEEPY  VSS.  STERI STRIPS AND BAND-AIDS ON SUPRAPUBIC ABDOMEN WITH MINIMAL OOZING.  TURNER CATHETER IN PLACE DRAINING PINK TINGED URINE.  1625 AWAKE  C/O NAUSEA. MEDICATED PER ORDER.  1645 NAUSEA RESOLVED.  GIVEN SIPS OF WATER.  1700 SISTER BROUGHT TO BEDSIDE.   1705 DR DUNHAM AT BEDSIDE.  PATIENT DENIES PAIN/NAUSEA.  SLEEPY  1745 GIVEN ICE CREAM AND SPRITE.  NO CHANGE AT SITE.  SALVATORE PAD DRY.    1830 TURNER CATHETER INSTRUCTIONS AND DEMONSTRATION SHOWN TO PATIENT AND SISTER.  DISCHARGE INSTRUCTIONS GIVEN.  PATIENT UP AND DRESSED.  FEELS READY FOR DISCHARGE.  ALL QUESTIONS ANSWERED.  DISCHARGED TO HOME

## 2017-10-17 NOTE — OP REPORT
DATE OF SERVICE:  10/17/2017    PREOPERATIVE DIAGNOSES:  Recurrent pelvic floor relaxation, pelvic pain,   dyspareunia, recurrent stress urinary incontinence.    POSTOPERATIVE DIAGNOSES:  Recurrent pelvic floor relaxation, pelvic pain,   dyspareunia, recurrent stress urinary incontinence.    PROCEDURES:  Anterior vaginal repair with apical vaginal repair with the   sacrospinous vault suspension bilateral placement using the Uphold mesh   product, posterior repair, enterocele repair, perineoplasty, transobturator   tape midurethral sling procedure, cystoscopy.    SURGEON:  Angel Hernadez MD    ASSISTANT:  Tim Matt MD    ANESTHESIA:  General.    ANESTHESIOLOGIST:  Glenda Mclaughlin MD    ESTIMATED BLOOD LOSS FOR THE PROCEDURE:  100 mL    FINDINGS:  Good support of the anterior and posterior vaginal walls in   addition to the apical vaginal tissue without any undue tension in the suture   sites.  Cystoscopy revealed bilateral ureteral efflux, normal bladder, and no   undue tension noted at the level of the mid urethra after sling placement on   cystoscopic evaluation.  Of note, an attempt to place the TVT product   Advantage plus sling was unsuccessful in that the left arm was noted to have   buttonholed the bladder and was removed under direct cystoscopic evaluation   and a decision was made against trying to replace direction of the sling and   in lieu of the TVT product, decision was made to proceed forward with a   transobturator tape midurethral sling under direct cystoscopic evaluation as   well.    DESCRIPTION OF PROCEDURE:  The patient was taken to the operating room where   general anesthesia was performed without difficulty.  The patient was then   prepped and draped in usual sterile fashion.  Lower extremities placed in   Michael stirrups.  Attention was first turned to the perineum where a weighted   speculum was placed with the use of Bardales retractor.  An Allis clamp was placed   on the anterior vaginal  mucosa on each side just superior to the vaginal cuff   lateral to the midline on the anterior vaginal mucosa with 10 mL of 0.25%   Marcaine with epinephrine injected into the anterior vaginal mucosa.  The   vaginal mucosa was then dissected off the underlying pubocervical fascia   laterally until the levator muscles were then reached.  Dissection on the   sacrospinous process ischial spine was then performed on each side, 3 cm   medial along the sacrospinous ligament with straight catheterization needle   device, the Uphold suture was then placed through the sacrospinous ligament.    Once the Uphold mesh was then placed through the sacrospinous ligament on each   side, the apex of the vaginal vault was then sutured to the Uphold mesh,   apical portion of the product in 3 different places in the midline and just   laterally on each side with 0 Vicryl suture.  The more distal portion of the   mesh was then sutured just distal to the mid urethra and again, sutured in the   midline and on each side.  Once tied down, there was good reapproximation of   the apex of the vaginal vault to the sacrospinous ligaments.  The device was   then released.  All excess vaginal mucosa was then trimmed.  Attention was   then turned to dissection in the posterior ramus.  10 mL of 0.25% Marcaine   with epinephrine were then injected using a 20-gauge spinal needle in the   retropubic space of Retzius.  Attention was then turned to placement of the   Advantage plus TVT product with the passing device placing the mesh first on   the right in the retropubic space going up through the suprapubic skin after   an incision was made and then on the left hand side in the same fashion.    Attention was then turned to performing a cystoscopy, which revealed a   buttonhole of the left arm.  This was removed under direct cystoscopic   evaluation.  No bleeding was noted at the buttonhole sites nor any   extravasation appreciated.  Attention was then  turned to removing the device   under direct cystoscopic evaluation.  Attention was then turned to injection   of 10 mL of 0.25% Marcaine with epinephrine lateral to the clitoris in the   labial crural folds on each side with 5 mL injected on the right and 5 mL   injected on the left.  An incision was made in the labial crural fold.  The   Obtryx halo device was then placed through the adductor brevis, obturator   externus, obturator membrane, obturator internus muscle, endopelvic fascia out   through the vaginal mucosa at the level of the mid urethra.  The sling was   then applied to the Obtryx halo needle.  Needle was then taken back up through   the original track.  Tensioning and position was then performed.  Dumont   catheter was removed.  Tensioning and position was then finalized under direct   cystoscopic evaluation.  The sling was then released under direct cystoscopic   evaluation.  Tensioning and position was then finalized.  The 30-degree   cystoscope was then removed.  Dumont catheter replaced.  All excess vaginal   mucosa and sling material were then excised.  The vaginal mucosa was then   reapproximated with 2-0 Vicryl in a running interlocking fashion in one   segment.  Posterior vaginal mucosa was then injected with 10 mL of 0.25%   Marcaine with epinephrine.  The vaginal mucosa was then dissected off the   underlying rectovaginal fascia until the levator muscles were then reached.    Large enterocele was located at the superior aspect of dissection.  The   rectovaginal septum was then reapproximated in the posterior aspect of the   vaginal cuff and a pursestring suture using 2-0 Vicryl in a double pursestring   suture.  Horizontal mattress sutures were then used to reapproximate the   rectovaginal fascia in the midline in a double layer closure.  All excess   vaginal mucosa was then trimmed.  The vaginal mucosa was then reapproximated   with 2-0 Vicryl running interlocking fashion in one segment  performing a   perineoplasty on the perineum.  Attention was then turned to reapproximation   of the suprapubic incision sites with single interrupted sutures using 4-0   Vicryl.  The procedure was then deemed complete.  The patient tolerated the   procedure well, was awoken from general anesthesia, and was taken to the   recovery room in stable condition.       ____________________________________     MD SUSY OROURKE / LILLIAN    DD:  10/17/2017 16:01:48  DT:  10/17/2017 16:38:08    D#:  [id number]  Job#:  [id number]

## 2017-10-17 NOTE — OR SURGEON
Operative Report    PreOp Diagnosis: Recurrent pelvic floor relaxation, pelvic pain, dyspareunia in addition to stress urinary incontinence    PostOp Diagnosis: Same    Procedure(s):  ANTERIOR AND POSTERIOR REPAIR W/UPHOLD MESH - Wound Class: Clean Contaminated  ENTEROCELE REPAIR - PERINEOPLASTY - Wound Class: Clean Contaminated  VAGINAL SUSPENSION - SACROSPINOUS VAULT - Wound Class: Clean Contaminated  BLADDER SLING FEMALE - TRANSOBTURATOR TAPE PROCEDURE - Wound Class: Clean Contaminated  CYSTOSCOPY - Wound Class: Clean Contaminated    Surgeon(s):  CHARI Norris M.D.    Anesthesiologist/Type of Anesthesia:  Anesthesiologist: Glenda Mclaughlin M.D./General    Surgical Staff:  Circulator: Ashley Gaytan R.N.  Relief Circulator: Heike Oliveira R.N.  Scrub Person: Danika Deluca    Specimens:  None    Estimated Blood Loss: 100ccs    Findings: Good support of the anterior and posterior and apical vaginal tissue without any undue tension at any suture sites, cystoscopy revealed bilateral ureteral efflux, normal bladder and no undue tension at the mid urethra after sling placement on cystoscopy. Of note on an attempt to place the TVT left arm this was noted to button hole the bladder and sling material could be seen. This was removed under cystoscopic evaluation and a decision was made again a retry placement of the TVT arm and a TOT was placed in lieu of a repeat attempt at placement    Complications: Cystotomy with the TVT arm of the sling procedure which was removed under direct cystoscopic evaluation        10/17/2017 3:46 PM Angel Hernadez

## 2017-10-18 NOTE — DISCHARGE INSTRUCTIONS
ACTIVITY: Rest and take it easy for the first 24 hours.  A responsible adult is recommended to remain with you during that time.  It is normal to feel sleepy.  We encourage you to not do anything that requires balance, judgment or coordination.    MILD FLU-LIKE SYMPTOMS ARE NORMAL. YOU MAY EXPERIENCE GENERALIZED MUSCLE ACHES, THROAT IRRITATION, HEADACHE AND/OR SOME NAUSEA.    FOR 24 HOURS DO NOT:  Drive, operate machinery or run household appliances.  Drink beer or alcoholic beverages.   Make important decisions or sign legal documents.    SPECIAL INSTRUCTIONS: *CALL DR MURILLO'S OFFICE IN THE MORNING TO HAVE CATHETER REMOVED TOMORROW MORNING.  NO HEAVY LIFTING.  PELVIC REST - NO TAMPONS, DOUCHING OR INTERCOURSE**    DIET: To avoid nausea, slowly advance diet as tolerated, avoiding spicy or greasy foods for the first day.  Add more substantial food to your diet according to your physician's instructions.  Babies can be fed formula or breast milk as soon as they are hungry.  INCREASE FLUIDS AND FIBER TO AVOID CONSTIPATION.    SURGICAL DRESSING/BATHING: *OK TO SHOWER AFTER CATHETER IS REMOVED TOMORROW.  NO TUB BATHS, HOT TUBS OR SWIMMING**    FOLLOW-UP APPOINTMENT:  A follow-up appointment should be arranged with your doctor; call to schedule.    You should CALL YOUR PHYSICIAN if you develop:  Fever greater than 101 degrees F.  Pain not relieved by medication, or persistent nausea or vomiting.  Excessive bleeding (blood soaking through dressing) or unexpected drainage from the wound.  Extreme redness or swelling around the incision site, drainage of pus or foul smelling drainage.  Inability to urinate or empty your bladder within 8 hours.  Problems with breathing or chest pain.    You should call 911 if you develop problems with breathing or chest pain.  If you are unable to contact your doctor or surgical center, you should go to the nearest emergency room or urgent care center.    Physician's telephone #:  *[phone]**    If any questions arise, call your doctor.  If your doctor is not available, please feel free to call the Surgical Center at [phone].  The Center is open Monday through Friday from 7AM to 7PM.  You can also call the HEALTH HOTLINE open 24 hours/day, 7 days/week and speak to a nurse at [phone], or toll free at [phone].    A registered nurse may call you a few days after your surgery to see how you are doing after your procedure.    MEDICATIONS: Resume taking daily medication.  Take prescribed pain medication with food.  If no medication is prescribed, you may take non-aspirin pain medication if needed.  PAIN MEDICATION CAN BE VERY CONSTIPATING.  Take a stool softener or laxative such as senokot, pericolace, or milk of magnesia if needed.     Last pain medication given at *______________**.    If your physician has prescribed pain medication that includes Acetaminophen (Tylenol), do not take additional Acetaminophen (Tylenol) while taking the prescribed medication.    Depression / Suicide Risk    As you are discharged from this [facility] facility, it is important to learn how to keep safe from harming yourself.    Recognize the warning signs:  · Abrupt changes in personality, positive or negative- including increase in energy   · Giving away possessions  · Change in eating patterns- significant weight changes-  positive or negative  · Change in sleeping patterns- unable to sleep or sleeping all the time   · Unwillingness or inability to communicate  · Depression  · Unusual sadness, discouragement and loneliness  · Talk of wanting to die  · Neglect of personal appearance   · Rebelliousness- reckless behavior  · Withdrawal from people/activities they love  · Confusion- inability to concentrate     If you or a loved one observes any of these behaviors or has concerns about self-harm, here's what you can do:  · Talk about it- your feelings and reasons for harming yourself  · Remove any  means that you might use to hurt yourself (examples: pills, rope, extension cords, firearm)  · Get professional help from the community (Mental Health, Substance Abuse, psychological counseling)  · Do not be alone:Call your Safe Contact- someone whom you trust who will be there for you.  · Call your local CRISIS HOTLINE 166-5874 or 311-814-0640  · Call your local Children's Mobile Crisis Response Team [place] (733) 224-9579 or www.[employer]  · Call the toll free National Suicide Prevention Hotlines   · National Suicide Prevention Lifeline 309-874-QQBF (4600)  · National Hope Line Network 800-SUICIDE (252-3942)

## 2017-11-08 ENCOUNTER — OFFICE VISIT (OUTPATIENT)
Dept: CARDIOLOGY | Facility: MEDICAL CENTER | Age: 42
End: 2017-11-08
Payer: MEDICAID

## 2017-11-08 VITALS
HEART RATE: 76 BPM | DIASTOLIC BLOOD PRESSURE: 88 MMHG | WEIGHT: 211 LBS | OXYGEN SATURATION: 95 % | BODY MASS INDEX: 35.16 KG/M2 | SYSTOLIC BLOOD PRESSURE: 128 MMHG | HEIGHT: 65 IN

## 2017-11-08 DIAGNOSIS — R00.2 PALPITATIONS: ICD-10-CM

## 2017-11-08 DIAGNOSIS — I20.89 STABLE ANGINA PECTORIS: ICD-10-CM

## 2017-11-08 PROBLEM — R10.2 ADNEXAL TENDERNESS, RIGHT: Status: RESOLVED | Noted: 2017-04-11 | Resolved: 2017-11-08

## 2017-11-08 PROCEDURE — 99244 OFF/OP CNSLTJ NEW/EST MOD 40: CPT | Performed by: INTERNAL MEDICINE

## 2017-11-08 RX ORDER — OMEPRAZOLE 20 MG/1
20 CAPSULE, DELAYED RELEASE ORAL DAILY
Status: ON HOLD | COMMUNITY
End: 2018-02-22

## 2017-11-08 ASSESSMENT — ENCOUNTER SYMPTOMS
PALPITATIONS: 1
BRUISES/BLEEDS EASILY: 0
COUGH: 0
DIZZINESS: 0
CLAUDICATION: 0
EYES NEGATIVE: 1
INSOMNIA: 0
NERVOUS/ANXIOUS: 0
SHORTNESS OF BREATH: 0
NAUSEA: 0
WEIGHT LOSS: 0
PND: 0
LOSS OF CONSCIOUSNESS: 0
HEARTBURN: 0

## 2017-11-08 NOTE — PROGRESS NOTES
Subjective:   Tanya Gaston is a 42 y.o. female who presents today As a new patient. She was sent by Dr. Hernadez in regards to her abnormal EKG    She admits also she's been having chest discomfort and palpitations. The discomfort has gone on for months if not years. She often feels tightness when she takes a deep breath, this is reproducible on the right side of her chest, it is mild but often worse when she is anxious. She sleeps well and it doesn't interfere with her activity. She has 5 children and recently underwent pelvic floor surgery, unfortunately she says it didn't help much with her urinary incontinence    No setbacks or limitations of her activities. She works full time. She admits the palpitations come and go, they last for seconds. She thinks they feel better with her new losartan. They're not getting worse    I reminded her she was admitted for chest pain 2 years ago. She said it is somewhat similar.    Past Medical History:   Diagnosis Date   • Anemia    • Diabetes     Oral meds   • Heart burn    • Hypertension 2006    Medication   • Urinary incontinence      Past Surgical History:   Procedure Laterality Date   • ANTERIOR AND POSTERIOR REPAIR  10/17/2017    Procedure: ANTERIOR AND POSTERIOR REPAIR W/UPHOLD MESH;  Surgeon: Angel Hernadez M.D.;  Location: SURGERY SAME DAY [facility];  Service: Gynecology   • ENTEROCELE REPAIR  10/17/2017    Procedure: ENTEROCELE REPAIR - PERINEOPLASTY;  Surgeon: Angel Hernadez M.D.;  Location: SURGERY SAME DAY [facility];  Service: Gynecology   • VAGINAL SUSPENSION  10/17/2017    Procedure: VAGINAL SUSPENSION - SACROSPINOUS VAULT;  Surgeon: Angel Hernadez M.D.;  Location: SURGERY SAME DAY [facility];  Service: Gynecology   • BLADDER SLING FEMALE  10/17/2017    Procedure: BLADDER SLING FEMALE - TENSION FREE TAPE PROCEDURE;  Surgeon: nAgel Hernadez M.D.;  Location: SURGERY SAME DAY [facility];  Service: Gynecology   • CYSTOSCOPY N/A  10/17/2017    Procedure: CYSTOSCOPY;  Surgeon: Angel Hernadez M.D.;  Location: SURGERY SAME DAY [facility];  Service: Gynecology   • ANTERIOR AND POSTERIOR REPAIR  4/11/2017    Procedure: ANTERIOR AND POSTERIOR REPAIR;  Surgeon: Angel Hernadez M.D.;  Location: SURGERY SAME DAY [facility];  Service:    • ENTEROCELE REPAIR  4/11/2017    Procedure: ENTEROCELE REPAIR- PERINEOPLASTY;  Surgeon: Angel Hernadez M.D.;  Location: SURGERY SAME DAY [facility];  Service:    • BLADDER SLING FEMALE  4/11/2017    Procedure: BLADDER SLING FEMALE-TOT;  Surgeon: Angel Hernadez M.D.;  Location: SURGERY SAME DAY [facility];  Service:    • VAGINAL SUSPENSION  4/11/2017    Procedure: VAGINAL SUSPENSION-SACROSPINOUS VAULT;  Surgeon: Angel Hernadez M.D.;  Location: SURGERY SAME DAY [facility];  Service:    • VAGINAL HYSTERECTOMY SCOPE TOTAL  10/25/2016    Procedure: VAGINAL HYSTERECTOMY SCOPE TOTAL;  Surgeon: Angel Hernadez M.D.;  Location: SURGERY SAME DAY [facility];  Service:    • SALPINGECTOMY Bilateral 10/25/2016    Procedure: SALPINGECTOMY;  Surgeon: Angel Hernadez M.D.;  Location: SURGERY SAME DAY [facility];  Service:    • ANTERIOR AND POSTERIOR REPAIR  10/25/2016    Procedure: ANTERIOR AND POSTERIOR REPAIR;  Surgeon: Angel Hernadez M.D.;  Location: SURGERY SAME DAY [facility];  Service:    • ENTEROCELE REPAIR  10/25/2016    Procedure: ENTEROCELE REPAIR, PERINEOPLASTY;  Surgeon: Angel Hernadez M.D.;  Location: SURGERY SAME DAY [facility];  Service:    • BLADDER SLING FEMALE  10/25/2016    Procedure: BLADDER SLING FEMALE TOT;  Surgeon: Angel Hernadez M.D.;  Location: SURGERY SAME DAY [facility];  Service:    • VAGINAL SUSPENSION  10/25/2016    Procedure: VAGINAL SUSPENSION SACROSPINOUS VAULT ;  Surgeon: Angel Hernadez M.D.;  Location: SURGERY SAME DAY [facility];  Service:    • OTHER      Tubal ligation     Family History   Problem Relation Age of Onset   • Diabetes Mother      pills   •  "Hypertension Mother    • Diabetes Father      insulin   • Diabetes Paternal Grandmother    • Diabetes Paternal Aunt      History   Smoking Status   • Never Smoker   Smokeless Tobacco   • Never Used     Allergies   Allergen Reactions   • Metformin Rash     Rash       Outpatient Encounter Prescriptions as of 11/8/2017   Medication Sig Dispense Refill   • omeprazole (PRILOSEC) 20 MG delayed-release capsule Take 20 mg by mouth every day.     • losartan (COZAAR) 100 MG Tab Take 100 mg by mouth every morning.     • buPROPion (WELLBUTRIN XL) 150 MG XL tablet Take 150 mg by mouth every bedtime.     • Canagliflozin (INVOKANA) 300 MG Tab Take 300 mg by mouth every morning.     • NIFEdipine SR (NIFEDICAL XL) 30 MG tablet Take 30 mg by mouth every evening. Indications: High Blood Pressure     • hydrochlorothiazide (HYDRODIURIL) 12.5 MG tablet Take 12.5 mg by mouth ONE-HALF HOUR AFTER LUNCH.     • aspirin EC (ECOTRIN) 81 MG Tablet Delayed Response Take 81 mg by mouth every morning.       No facility-administered encounter medications on file as of 11/8/2017.      Review of Systems   Constitutional: Negative for malaise/fatigue and weight loss.   HENT: Negative for hearing loss.    Eyes: Negative.    Respiratory: Negative for cough and shortness of breath.    Cardiovascular: Positive for chest pain and palpitations. Negative for claudication, leg swelling and PND.   Gastrointestinal: Negative for heartburn and nausea.   Skin: Negative for rash.   Neurological: Negative for dizziness and loss of consciousness.   Endo/Heme/Allergies: Does not bruise/bleed easily.   Psychiatric/Behavioral: The patient is not nervous/anxious and does not have insomnia.    All other systems reviewed and are negative.       Objective:   /88   Pulse 76   Ht 1.651 m (5' 5\")   Wt 95.7 kg (211 lb)   LMP 10/13/2016 (Exact Date)   SpO2 95%   BMI 35.11 kg/m²     Physical Exam   Constitutional: She appears well-developed and well-nourished.   HENT: "   Head: Normocephalic and atraumatic.   Eyes: EOM are normal. Pupils are equal, round, and reactive to light.   Neck: No JVD present. No thyromegaly present.   Cardiovascular: Normal rate, regular rhythm and intact distal pulses.    No murmur heard.  Pulmonary/Chest: Breath sounds normal. No respiratory distress.   Abdominal: Bowel sounds are normal. She exhibits no distension.   Musculoskeletal: She exhibits no edema or tenderness.   Neurological: She is alert. She exhibits normal muscle tone. Coordination normal.   Skin: Skin is warm and dry. No rash noted.   Psychiatric: She has a normal mood and affect. Her behavior is normal.       Assessment:     1. Stable angina pectoris (CMS-[facility])  Echocardiogram Comp w/o Cont   2. Palpitations         Medical Decision Making:  Today's Assessment / Status / Plan:     Chest discomfort, 2015 echo nuclear and EKGs are reviewed. Her recent EKG is unchanged. She is nondiagnostic inferior Q waves    Abnormal EKG in the setting of symptoms  Low risk features, reassurance given  Hypertension medicine as she is doing  Echocardiogram to ensure the inferior wall is normal as is the function of the left ventricle    If so, we could easily give her beta blockers at low doses to ease her palpitations  Questions answered discussed in both Greek and English     RTC as needed

## 2017-11-20 ENCOUNTER — TELEPHONE (OUTPATIENT)
Dept: CARDIOLOGY | Facility: MEDICAL CENTER | Age: 42
End: 2017-11-20

## 2017-11-20 ENCOUNTER — HOSPITAL ENCOUNTER (OUTPATIENT)
Dept: CARDIOLOGY | Facility: MEDICAL CENTER | Age: 42
End: 2017-11-20
Attending: INTERNAL MEDICINE
Payer: MEDICAID

## 2017-11-20 DIAGNOSIS — I20.89 STABLE ANGINA PECTORIS: ICD-10-CM

## 2017-11-20 LAB
LV EJECT FRACT  99904: 65
LV EJECT FRACT MOD 2C 99903: 62.54
LV EJECT FRACT MOD 4C 99902: 68.84
LV EJECT FRACT MOD BP 99901: 65.91

## 2017-11-20 PROCEDURE — 93306 TTE W/DOPPLER COMPLETE: CPT | Mod: 26 | Performed by: INTERNAL MEDICINE

## 2017-11-20 PROCEDURE — 93306 TTE W/DOPPLER COMPLETE: CPT

## 2017-11-20 NOTE — TELEPHONE ENCOUNTER
----- Message from Melodie Armando R.N. sent at 11/20/2017  2:45 PM PST -----      ----- Message -----  From: Cheryl Watkins M.D.  Sent: 11/20/2017   1:03 PM  To: Celeste Page R.N.    Normal & reassuring echo  Great news

## 2018-02-08 NOTE — H&P
DATE OF SURGERY:  2018    REASON FOR SURGERY:  Recurrent symptomatic pelvic floor relaxation with   recurrent stress urinary incontinence, status post transobturator tape   midurethral sling procedure.    HISTORY OF PRESENT ILLNESS:  This is a 42-year-old woman  5, para 5,   status post previous deliveries, largest infant weighing 9 pounds 8 ounces   with near complete vaginal prolapse, once again status post previous pelvic   floor repair, urinary incontinence.  Postoperatively, the patient initially   did well; however, began having a slow progression of the anterior vaginal   wall inferiorly in addition to the posterior vaginal wall and now finally the   apex along with recurrent urinary incontinence.  The patient has attempted to   proceed forward with pelvic floor exercise therapy.  She was offered therapy   with Dr. Ronnie Mendez for additional pelvic floor exercises for which she   has declined.  She declines pessary therapy.  She is now interested in   proceeding forward with a native tissue repair once again understanding her   history of failure with native tissue repair.  She also wishes to proceed   forward with a tension-free vaginal tape given her failure of the   transobturator tape midurethral sling procedure.  Risks, benefits, and   alternatives including the additional risks associated with tension-free   vaginal tape were discussed with the patient in detail.  She has asked   appropriate questions, signed the appropriate consents, and wished to proceed   forward with surgery as planned.    PAST MEDICAL HISTORY:  Adult-onset diabetes mellitus, chronic hypertension,   otherwise as stated above.    PAST SURGICAL HISTORY:  The patient did have a previous pelvic floor repair   with transobturator tape midurethral sling procedure.    OBSTETRICAL HISTORY:  The patient had 4 previous deliveries between  and   .    GYNECOLOGIC HISTORY:  The patient has been amenorrheic since her  previous   surgery.  She does report significant pain, dyspareunia which she attributes   to vaults of the vaginal introitus.  Once again, she does understand her   pelvic pain, dyspareunia, and overactive bladder symptoms may be unimproved or   actually worsened with the surgery as described above requiring additional   surgery, workup, and management.  She does understand these limitations of   surgery and addressing these concerns as noted above and understanding her   history of previous failure and the possibility of recurrent failure once   again.  Given all these limitations and after discussion over many visits, she   is still wishing to proceed forward with surgery as described above.    SOCIAL HISTORY:  She is a social drinker.  She denies use of any tobacco.  She   denies any other drug use.  She is .  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    MEDICATIONS:  Iron sulfate 325 mg p.o. daily, losartan 100 mg p.o. daily,   metformin 1000 mg twice daily, omeprazole 20 mg p.o. daily.    ALLERGIES:  No known drug allergies.    PHYSICAL EXAMINATION:  VITAL SIGNS:  She is afebrile, hemodynamically stable.  HEART:  Regular rate and rhythm.  CHEST:  Clear to auscultation bilaterally.  ABDOMEN:  Soft, obese, nontender.  PELVIC:  Bimanual exam shows normal external female genitalia.  Minimal   atrophic changes externally and along the vaginal wall, grade II anterior,   posterior apical vaginal relaxation.  Rectovaginal exam was performed with   minimal tenderness to palpation at the apical portion of the vaginal cuff.  No   adnexal masses were appreciated.  She was guaiac negative on rectovaginal   examination.    LABORATORY DATA:  Urodynamic studies continue to show type 2 stress urinary   incontinence.    ASSESSMENT AND PLAN:  A 42-year-old woman,  5, para 5, now with   recurrent symptomatic pelvic floor relaxation, stress urinary incontinence,   refractory to transobturator tape and pelvic floor native tissue  repair, now   interested in proceeding forward with a tension-free vaginal tape in addition   to a similar pelvic floor repair that has been performed previously with   native tissue repair.  Risk, benefits, alternatives, understanding the   limitations of surgery were addressed with the patient in detail over several   visits.  She has asked appropriate questions, signed the appropriate consents,   and is wishing to proceed forward with surgery as planned.       ____________________________________     MD SUSY OROURKE / LILLIAN    DD:  02/07/2018 18:40:03  DT:  02/07/2018 20:00:42    D#:  [id number]  Job#:  [id number]

## 2018-02-14 DIAGNOSIS — Z01.812 PRE-OPERATIVE LABORATORY EXAMINATION: ICD-10-CM

## 2018-02-14 DIAGNOSIS — Z01.810 PRE-OPERATIVE CARDIOVASCULAR EXAMINATION: ICD-10-CM

## 2018-02-14 LAB
ANION GAP SERPL CALC-SCNC: 7 MMOL/L (ref 0–11.9)
BASOPHILS # BLD AUTO: 0.6 % (ref 0–1.8)
BASOPHILS # BLD: 0.05 K/UL (ref 0–0.12)
BUN SERPL-MCNC: 13 MG/DL (ref 8–22)
CALCIUM SERPL-MCNC: 9.5 MG/DL (ref 8.5–10.5)
CHLORIDE SERPL-SCNC: 101 MMOL/L (ref 96–112)
CO2 SERPL-SCNC: 30 MMOL/L (ref 20–33)
CREAT SERPL-MCNC: 0.53 MG/DL (ref 0.5–1.4)
EKG IMPRESSION: NORMAL
EOSINOPHIL # BLD AUTO: 0.11 K/UL (ref 0–0.51)
EOSINOPHIL NFR BLD: 1.4 % (ref 0–6.9)
ERYTHROCYTE [DISTWIDTH] IN BLOOD BY AUTOMATED COUNT: 41.1 FL (ref 35.9–50)
GLUCOSE SERPL-MCNC: 111 MG/DL (ref 65–99)
HCT VFR BLD AUTO: 46 % (ref 37–47)
HGB BLD-MCNC: 14.7 G/DL (ref 12–16)
IMM GRANULOCYTES # BLD AUTO: 0.02 K/UL (ref 0–0.11)
IMM GRANULOCYTES NFR BLD AUTO: 0.3 % (ref 0–0.9)
LYMPHOCYTES # BLD AUTO: 2.71 K/UL (ref 1–4.8)
LYMPHOCYTES NFR BLD: 34.9 % (ref 22–41)
MCH RBC QN AUTO: 27.2 PG (ref 27–33)
MCHC RBC AUTO-ENTMCNC: 32 G/DL (ref 33.6–35)
MCV RBC AUTO: 85 FL (ref 81.4–97.8)
MONOCYTES # BLD AUTO: 0.48 K/UL (ref 0–0.85)
MONOCYTES NFR BLD AUTO: 6.2 % (ref 0–13.4)
NEUTROPHILS # BLD AUTO: 4.4 K/UL (ref 2–7.15)
NEUTROPHILS NFR BLD: 56.6 % (ref 44–72)
NRBC # BLD AUTO: 0 K/UL
NRBC BLD-RTO: 0 /100 WBC
PLATELET # BLD AUTO: 235 K/UL (ref 164–446)
PMV BLD AUTO: 11.2 FL (ref 9–12.9)
POTASSIUM SERPL-SCNC: 3.8 MMOL/L (ref 3.6–5.5)
RBC # BLD AUTO: 5.41 M/UL (ref 4.2–5.4)
SODIUM SERPL-SCNC: 138 MMOL/L (ref 135–145)
WBC # BLD AUTO: 7.8 K/UL (ref 4.8–10.8)

## 2018-02-14 PROCEDURE — 93010 ELECTROCARDIOGRAM REPORT: CPT | Performed by: INTERNAL MEDICINE

## 2018-02-14 PROCEDURE — 80048 BASIC METABOLIC PNL TOTAL CA: CPT

## 2018-02-14 PROCEDURE — 93005 ELECTROCARDIOGRAM TRACING: CPT

## 2018-02-14 PROCEDURE — 36415 COLL VENOUS BLD VENIPUNCTURE: CPT

## 2018-02-14 PROCEDURE — 85025 COMPLETE CBC W/AUTO DIFF WBC: CPT

## 2018-02-14 RX ORDER — ATORVASTATIN CALCIUM 20 MG/1
20 TABLET, FILM COATED ORAL NIGHTLY
COMMUNITY
End: 2018-09-24

## 2018-02-14 RX ORDER — LOSARTAN POTASSIUM AND HYDROCHLOROTHIAZIDE 25; 100 MG/1; MG/1
1 TABLET ORAL DAILY
COMMUNITY
End: 2018-09-24

## 2018-02-22 ENCOUNTER — HOSPITAL ENCOUNTER (OUTPATIENT)
Facility: MEDICAL CENTER | Age: 43
End: 2018-02-22
Attending: SPECIALIST | Admitting: SPECIALIST
Payer: MEDICAID

## 2018-02-22 VITALS
DIASTOLIC BLOOD PRESSURE: 82 MMHG | BODY MASS INDEX: 36.09 KG/M2 | OXYGEN SATURATION: 95 % | RESPIRATION RATE: 12 BRPM | HEART RATE: 63 BPM | WEIGHT: 211.42 LBS | SYSTOLIC BLOOD PRESSURE: 142 MMHG | TEMPERATURE: 97.7 F | HEIGHT: 64 IN

## 2018-02-22 LAB — GLUCOSE BLD-MCNC: 84 MG/DL (ref 65–99)

## 2018-02-22 PROCEDURE — 501330 HCHG SET, CYSTO IRRIG TUBING: Performed by: SPECIALIST

## 2018-02-22 PROCEDURE — 500892 HCHG PACK, PERI-GYN: Performed by: SPECIALIST

## 2018-02-22 PROCEDURE — 700111 HCHG RX REV CODE 636 W/ 250 OVERRIDE (IP)

## 2018-02-22 PROCEDURE — 700102 HCHG RX REV CODE 250 W/ 637 OVERRIDE(OP)

## 2018-02-22 PROCEDURE — 160029 HCHG SURGERY MINUTES - 1ST 30 MINS LEVEL 4: Performed by: SPECIALIST

## 2018-02-22 PROCEDURE — C1771 REP DEV, URINARY, W/SLING: HCPCS | Performed by: SPECIALIST

## 2018-02-22 PROCEDURE — 160041 HCHG SURGERY MINUTES - EA ADDL 1 MIN LEVEL 4: Performed by: SPECIALIST

## 2018-02-22 PROCEDURE — 502240 HCHG MISC OR SUPPLY RC 0272: Performed by: SPECIALIST

## 2018-02-22 PROCEDURE — 160048 HCHG OR STATISTICAL LEVEL 1-5: Performed by: SPECIALIST

## 2018-02-22 PROCEDURE — 160035 HCHG PACU - 1ST 60 MINS PHASE I: Performed by: SPECIALIST

## 2018-02-22 PROCEDURE — 160002 HCHG RECOVERY MINUTES (STAT): Performed by: SPECIALIST

## 2018-02-22 PROCEDURE — A9270 NON-COVERED ITEM OR SERVICE: HCPCS

## 2018-02-22 PROCEDURE — A4338 INDWELLING CATHETER LATEX: HCPCS | Performed by: SPECIALIST

## 2018-02-22 PROCEDURE — 501838 HCHG SUTURE GENERAL: Performed by: SPECIALIST

## 2018-02-22 PROCEDURE — 160009 HCHG ANES TIME/MIN: Performed by: SPECIALIST

## 2018-02-22 PROCEDURE — 82962 GLUCOSE BLOOD TEST: CPT

## 2018-02-22 PROCEDURE — 160036 HCHG PACU - EA ADDL 30 MINS PHASE I: Performed by: SPECIALIST

## 2018-02-22 RX ORDER — SIMETHICONE 80 MG
80 TABLET,CHEWABLE ORAL EVERY 8 HOURS PRN
Status: DISCONTINUED | OUTPATIENT
Start: 2018-02-22 | End: 2018-02-22 | Stop reason: HOSPADM

## 2018-02-22 RX ORDER — BUPIVACAINE HYDROCHLORIDE AND EPINEPHRINE 2.5; 5 MG/ML; UG/ML
INJECTION, SOLUTION INFILTRATION; PERINEURAL
Status: DISCONTINUED | OUTPATIENT
Start: 2018-02-22 | End: 2018-02-22 | Stop reason: HOSPADM

## 2018-02-22 RX ORDER — SODIUM CHLORIDE, SODIUM LACTATE, POTASSIUM CHLORIDE, CALCIUM CHLORIDE 600; 310; 30; 20 MG/100ML; MG/100ML; MG/100ML; MG/100ML
INJECTION, SOLUTION INTRAVENOUS CONTINUOUS
Status: DISCONTINUED | OUTPATIENT
Start: 2018-02-22 | End: 2018-02-22 | Stop reason: HOSPADM

## 2018-02-22 RX ORDER — OXYCODONE HYDROCHLORIDE 5 MG/1
10 TABLET ORAL
Status: DISCONTINUED | OUTPATIENT
Start: 2018-02-22 | End: 2018-02-22 | Stop reason: HOSPADM

## 2018-02-22 RX ORDER — ACETAMINOPHEN 500 MG
1000 TABLET ORAL EVERY 6 HOURS
Status: DISCONTINUED | OUTPATIENT
Start: 2018-02-22 | End: 2018-02-22 | Stop reason: HOSPADM

## 2018-02-22 RX ORDER — METOCLOPRAMIDE HYDROCHLORIDE 5 MG/ML
10 INJECTION INTRAMUSCULAR; INTRAVENOUS EVERY 4 HOURS PRN
Status: DISCONTINUED | OUTPATIENT
Start: 2018-02-22 | End: 2018-02-22 | Stop reason: HOSPADM

## 2018-02-22 RX ORDER — OXYCODONE HCL 5 MG/5 ML
SOLUTION, ORAL ORAL
Status: DISCONTINUED
Start: 2018-02-22 | End: 2018-02-22 | Stop reason: HOSPADM

## 2018-02-22 RX ORDER — ONDANSETRON 2 MG/ML
4 INJECTION INTRAMUSCULAR; INTRAVENOUS EVERY 6 HOURS PRN
Status: DISCONTINUED | OUTPATIENT
Start: 2018-02-22 | End: 2018-02-22 | Stop reason: HOSPADM

## 2018-02-22 RX ADMIN — FENTANYL CITRATE 25 MCG: 50 INJECTION, SOLUTION INTRAMUSCULAR; INTRAVENOUS at 17:26

## 2018-02-22 RX ADMIN — SODIUM CHLORIDE, SODIUM LACTATE, POTASSIUM CHLORIDE, CALCIUM CHLORIDE 1000 ML: 600; 310; 30; 20 INJECTION, SOLUTION INTRAVENOUS at 13:45

## 2018-02-22 ASSESSMENT — PAIN SCALES - GENERAL
PAINLEVEL_OUTOF10: 5
PAINLEVEL_OUTOF10: 0
PAINLEVEL_OUTOF10: 2

## 2018-02-23 NOTE — DISCHARGE INSTRUCTIONS
Instrucciones Para [place]  (Home Care Instructions)    ACTIVIDAD: Descanse y tome todo con mucha calma las primeras 24 horas después de sutton cirugía.  Lara persona adulta responsable debe permanecer con usted max demar periodo de tiempo.  Es normal sentirse sonoliento o sonolienta max esas primeras horas.  Le recomendamos que no alexis nada que requiera equilibrio, [place] decisiones a mucha coordinación de sutton parte.    NO ALEXIS ESTO PURANTE LAS PRIMERAS 24 HORAS:   Manejar o conducir algún vehiculo, operar maquinarias o utilizar electrodomesticos.   Beber cerveza o algún otro tipo de bebida alcohólica.   [place] decisiones importantes o firmar documentos legales.    INSTRUCCIONES ESPECIALES: Mantener la sonda hasta Miercoles.     DIETA: Para evitar las nauseas, prosiga despacito con sutton dieta a medida que pueda ir tolerándola mejor, evite comidas muy condimentadas o grasosas max demar primer día.  Vaya agregando comidas más substanciadas a sutton dieta a medida que asi lo indique sutton médica.  Los bebés pueden beber leche preparada o formula, ásl gabe también leche del seno de la madre a medida que vayan teniendo hambre.  SIGA AGREGANDO LIQUIDOS Y COMIDAS CON FIBRA PARA EVITAR ESTREÑIMIENTO.    GABE BAÑARSE Y CAMBIAR LOS VENDAJES DE LA CIRUGIA: No bath tubs, hot tubs or swimming until approved by Dr. Hernadez.     MEDICAMENTOS/MEDICINAS:  Vuelva a [place] leyla medicamentos diarios.  Ocilla los medicamentos que se le prescribe con un poco de comida.  Si no le prescribe ningún tipo de medicamento, entonces puede [place] medicinas para el dolor que no contienen aspirina, si las necesita.  LAS MEDICINAS PARA EL DOLOR PUEDEN ESTREÑIRLE MUCHO.  Ocilla un suavizante para el excremento o materia fecal (stool softener) o un laxativo gabe por ejemplo: senokot, pericolase, o leche de magnesia, si lo necesita.    La prescripción la administro *Patient already has prescriptions from pre-operative visit..  La ultima sosis de medicina para el dolor  fue administrada oxycodone 5mg 5:30pm.     Se debe hacer dot consulta medica con el doctor en miercoles, Líame para hacer la nohemi.    Usted debe LIAMAR A SUTTON MEDICO si tiene los siguientes síntomas:   -   Dot fiebre más melissa de 101 grados Fahrenheit.   -   Un dolor incesante aún con los medicamentos, o nauseas y vómito persistente.   -   Un sangrado excesivo (alejandra que traspasa los vendajes o gasas) o algúln tipo de drenaje inesperado que proviene de la henda.     -   Un color paulino exagerado o hinchazón alrededor del área en donde se le hizo incisión o verito, o un drenaje de pus o con olor lore proveniente de la henda.   -    La inhabilidad de orinar o vaciar sutton vejiga en 8 horas.   -    Problemas con a respiración o howard en el pecho.    Usted debe llamar al 911 si se presentan problemas con el dolor al respirar o el pecho.  Si no se puede ponnoer en comunicación con un medica o con el centro de cirugía, usted debe ir a la estación de emergencia (emergency room) más cercana o a un centro de atención de urgencia (urgent care center).  El teléfono del medico es: [phone]    LOS SÍNTOMAS DE UN LEVE RESFRIO SON MUY NORMALES.  ADEMÁS USTED PUEDE LLEGAR A SENTIR HOWARD GENERALES DE MÚSCULOS, IRRITACIÓN EN LA GARGANTA, HOWARD DE NOHEMI Y/O UN POCO DE NAUSEAS.    Sie tiene alguna pregunta, llame a sutton médico.  Si sutton médico no se encuentra disponible, por favor llame al Centro de Cirugía at [phone].  el Centro está abierto de Lunes a Viernes desde las 7:00 de la manana hasta las 7:00 de la noche.  Usted también puede llamar al CENTRO DE LLAMADAS SOBRE [facility] [place] o HEALTH HOTLINE.  Leelee está abierto viente y cuatro horas por nabeel, siete cole por semana, allí podrá hablar con dot enfermera.  Llame al [phone], o al número whitneychago 6 [phone].    Mi firma a continuación indica que he recibido y entiendco estas instrucciones acera de los cuidados en la casa (Home Care Instructions)    Lola recibirá dot  encuesta en la correspondencia en las siguientes semanas y le pedimos que por favor tome un momento para completar jean claude encuesta y regresaría a hosotros.  Nuestro objetivó es brindarle un cuidado muy tobar y par lo tanto apreciamos leyla coméntanos.  Muchas yunior por david escogido el Centro [facility] Cirugía de [facility].

## 2018-02-23 NOTE — OR SURGEON
Immediate Post OP Note    PreOp Diagnosis: Recurrent symptomatic pelvic floor relaxation with   recurrent stress urinary incontinence, status post transobturator tape   midurethral sling procedure.    PostOp Diagnosis: Same    Procedure(s):  ANTERIOR AND POSTERIOR REPAIR - Wound Class: Clean Contaminated  ENTEROCELE REPAIR - Wound Class: Clean Contaminated  VAGINAL SUSPENSION- SACROSPINOUS VAULT - Wound Class: Clean Contaminated  BLADDER SLING FEMALE- TENSION FREE VAGINAL TAPE - Wound Class: Clean Contaminated    Surgeon(s):  CHARI Norris M.D.    Anesthesiologist/Type of Anesthesia:  Anesthesiologist: Jhonatan Davis M.D./General    Surgical Staff:  Circulator: Margy Fields R.N.  Scrub Person: Brenda Pulido    Specimens:  None    Estimated Blood Loss: Less than 30ccs    Findings: Good support of the anterior and the posterior and the apical vaginal tissue without any undue tension at any suture sites, cystoscopy revealed bilateral ureteral efflux, normal bladder and no undue tension at mid urethra after sling placement    Complications: None        2/22/2018 5:05 PM Angel Hernadez

## 2018-02-23 NOTE — OP REPORT
DATE OF SERVICE:  02/22/2018    PREOPERATIVE DIAGNOSES:  Recurrent symptomatic pelvic floor relaxation with   recurrent stress urinary incontinence status post a previous transobturator   tape mid urethral sling, procedure, pelvic pain.    POSTOPERATIVE DIAGNOSES:  Recurrent symptomatic pelvic floor relaxation with   recurrent stress urinary incontinence status post a previous transobturator   tape mid urethral sling, procedure, pelvic pain.    SURGEON:  Angel Hernadez MD    ASSISTANT:  Tim Matt MD    ANESTHESIA:  General.    ANESTHESIOLOGIST:  Jhonatan Davis MD    PROCEDURE:  Anterior and posterior vaginal repair, enterocele repair,   perineoplasty, tension free vaginal tape, cystoscopy.    ESTIMATED BLOOD LOSS FOR THE PROCEDURE:  Less than 30 mL.    FINDINGS:  Good support of the anterior and posterior vaginal walls in   addition to apical vaginal tissue without any undue tension in the suture   sites.  Cystoscopy revealed normal urinary bladder, bilateral ureteral efflux,   and no undue tension noted at the level of the mid urethral after a   tension-free vaginal tape was placed.    DESCRIPTION OF PROCEDURE:  The patient was taken to operating room where   general anesthesia was performed without difficulty.  The patient was then   prepped and draped in usual sterile fashion.  Lower extremities placed in   Michael stirrups.  Attention was first turned to the perineum where a weighted   speculum was placed with the use of Bardales retractor.  An Allis clamp was just   placed at the vaginal apex.  The anterior vaginal mucosa was then injected   with 10 mL of 0.25% Marcaine with epinephrine.  The vaginal mucosa was then   dissected off the underlying pubocervical fascia candis, the levator muscles   were then reached.  Horizontal mattress suture was then used to reapproximate   the pubocervical fascia in midline in a double 0 closure, thereby reducing the   midline cystocele.  Dissection out posterior to the pubic  symphysis in the   space of Retzius was then performed with a spinal needle, 10 mL of 0.25%   Marcaine with epinephrine were placed in the space of Retzius.  Attention was   then turned to placement of the sling passing device.  Trocar was then placed   through the space of Retzius with an upward angle towards the mons pubis.    Incision was made in the suprapubic area on the mons pubis where the sling was   then placed through the skin.  This was loaded on the opposite side in the   space of Retzius.  Once again, sling passing device was then placed in the   space of Retzius in the posterior pubic region all the way up to the level of   the skin incision was made just lateral to the previous incision.  The sling   was then brought up through with minimal tension placed at the level of the   mid urethra.  The Dumont catheter was removed, which had been placed at the   beginning of the case for placement of a 30-degree cystoscope, which revealed   normal urinary bladder,  bilateral ureteral efflux and no undue tension noted   at the level of the mid urethra.  Once the sling was released under direct   cystoscopic evaluation, tensioning and positioning was then finalized.  The   cystoscope was then removed.  Dumont catheter replaced.  All excess vaginal   mucosa and same material was then excised.  The vaginal mucosa was then   reapproximated with 2-0 Vicryl in running locking fashion in one segment.  The   posterior vaginal mucosa was then injected with 10 mL of 0.25% Marcaine with   epinephrine.  The vaginal mucosa was then dissected off the underlying   rectovaginal fascia candis until the levator muscles were then reached.    Horizontal mattress sutures were then used to reapproximate the rectovaginal   fascia in the midline in a double layer closure, thereby reducing the midline   rectocele.  Dissection of the sacrospinous ligament just lateral to the   ischial spine, approximately 3 cm, was then performed.  The  Capio needle   device was then used to place the Ethibond sutures through the sacrospinous   ligament taken out through apical portion of the vaginal cuff from the   overlying vaginal mucosa.  Once tied down, there was good reapproximation of   the apex of the vaginal vault to the sacrospinous ligament.  The rectovaginal   septum was then reapproximated to the posterior aspect of the vaginal cuff and   a pursestring suture, thereby reducing a large enterocele located at the   superior aspect of dissection.  All excess vaginal mucosa was then trimmed.    The vaginal mucosa was then reapproximated with 2-0 Vicryl in running locking   fashion in one segment for a perineoplasty on the perineum.  The patient   tolerated procedure well and was awoken from general anesthesia, was taken to   the recovery in stable condition,       ____________________________________     MD SUSY OROURKE / LILLIAN    DD:  02/22/2018 17:11:58  DT:  02/22/2018 19:32:04    D#:  [id number]  Job#:  [id number]

## 2018-02-23 NOTE — PROGRESS NOTES
1709-Pt received from OR, report received from Dr. Davis and OR RN.     1715-Pt using bed pan to have BM.     1730-Pt c/o 5/10 lower abd pain, medicated per MAR. Family brought to bedside.     1750-Pt up to toilet to have another BM.     1808-DC instructions reviewed with pt and her sister who both verbalize understanding. Pt and her sister declining interpretation services.     1809-Pt tolerating sips of water, eating chocolate ice cream.     1840-Pt states she feels ready to go home. Pt discharged via wheelchair to private car with her sister.

## 2018-09-24 ENCOUNTER — HOSPITAL ENCOUNTER (EMERGENCY)
Facility: MEDICAL CENTER | Age: 43
End: 2018-09-24
Attending: EMERGENCY MEDICINE
Payer: MEDICAID

## 2018-09-24 ENCOUNTER — APPOINTMENT (OUTPATIENT)
Dept: RADIOLOGY | Facility: MEDICAL CENTER | Age: 43
End: 2018-09-24
Attending: EMERGENCY MEDICINE
Payer: MEDICAID

## 2018-09-24 VITALS
RESPIRATION RATE: 20 BRPM | BODY MASS INDEX: 38.99 KG/M2 | HEIGHT: 64 IN | WEIGHT: 228.4 LBS | HEART RATE: 68 BPM | DIASTOLIC BLOOD PRESSURE: 80 MMHG | SYSTOLIC BLOOD PRESSURE: 148 MMHG | OXYGEN SATURATION: 96 % | TEMPERATURE: 97.3 F

## 2018-09-24 DIAGNOSIS — R07.9 CHEST PAIN, UNSPECIFIED TYPE: ICD-10-CM

## 2018-09-24 DIAGNOSIS — R07.9 CHEST PAIN, UNSPECIFIED TYPE: Primary | ICD-10-CM

## 2018-09-24 LAB
ALBUMIN SERPL BCP-MCNC: 4.2 G/DL (ref 3.2–4.9)
ALBUMIN/GLOB SERPL: 1.3 G/DL
ALP SERPL-CCNC: 74 U/L (ref 30–99)
ALT SERPL-CCNC: 24 U/L (ref 2–50)
ANION GAP SERPL CALC-SCNC: 8 MMOL/L (ref 0–11.9)
APTT PPP: 27.3 SEC (ref 24.7–36)
AST SERPL-CCNC: 17 U/L (ref 12–45)
BASOPHILS # BLD AUTO: 0.5 % (ref 0–1.8)
BASOPHILS # BLD: 0.03 K/UL (ref 0–0.12)
BILIRUB SERPL-MCNC: 0.3 MG/DL (ref 0.1–1.5)
BNP SERPL-MCNC: 38 PG/ML (ref 0–100)
BUN SERPL-MCNC: 16 MG/DL (ref 8–22)
CALCIUM SERPL-MCNC: 9.5 MG/DL (ref 8.5–10.5)
CHLORIDE SERPL-SCNC: 104 MMOL/L (ref 96–112)
CO2 SERPL-SCNC: 27 MMOL/L (ref 20–33)
CREAT SERPL-MCNC: 0.73 MG/DL (ref 0.5–1.4)
DEPRECATED D DIMER PPP IA-ACNC: <0.4 UG/ML (FEU) (ref 0–0.5)
EKG IMPRESSION: NORMAL
EOSINOPHIL # BLD AUTO: 0.1 K/UL (ref 0–0.51)
EOSINOPHIL NFR BLD: 1.8 % (ref 0–6.9)
ERYTHROCYTE [DISTWIDTH] IN BLOOD BY AUTOMATED COUNT: 42.4 FL (ref 35.9–50)
GLOBULIN SER CALC-MCNC: 3.3 G/DL (ref 1.9–3.5)
GLUCOSE SERPL-MCNC: 170 MG/DL (ref 65–99)
HCT VFR BLD AUTO: 41.8 % (ref 37–47)
HGB BLD-MCNC: 13.8 G/DL (ref 12–16)
IMM GRANULOCYTES # BLD AUTO: 0.01 K/UL (ref 0–0.11)
IMM GRANULOCYTES NFR BLD AUTO: 0.2 % (ref 0–0.9)
INR PPP: 0.91 (ref 0.87–1.13)
LIPASE SERPL-CCNC: 31 U/L (ref 11–82)
LYMPHOCYTES # BLD AUTO: 2.24 K/UL (ref 1–4.8)
LYMPHOCYTES NFR BLD: 40.7 % (ref 22–41)
MCH RBC QN AUTO: 28.5 PG (ref 27–33)
MCHC RBC AUTO-ENTMCNC: 33 G/DL (ref 33.6–35)
MCV RBC AUTO: 86.4 FL (ref 81.4–97.8)
MONOCYTES # BLD AUTO: 0.31 K/UL (ref 0–0.85)
MONOCYTES NFR BLD AUTO: 5.6 % (ref 0–13.4)
NEUTROPHILS # BLD AUTO: 2.81 K/UL (ref 2–7.15)
NEUTROPHILS NFR BLD: 51.2 % (ref 44–72)
NRBC # BLD AUTO: 0 K/UL
NRBC BLD-RTO: 0 /100 WBC
PLATELET # BLD AUTO: 217 K/UL (ref 164–446)
PMV BLD AUTO: 11.4 FL (ref 9–12.9)
POTASSIUM SERPL-SCNC: 3.8 MMOL/L (ref 3.6–5.5)
PROT SERPL-MCNC: 7.5 G/DL (ref 6–8.2)
PROTHROMBIN TIME: 12.4 SEC (ref 12–14.6)
RBC # BLD AUTO: 4.84 M/UL (ref 4.2–5.4)
SODIUM SERPL-SCNC: 139 MMOL/L (ref 135–145)
TROPONIN I SERPL-MCNC: <0.01 NG/ML (ref 0–0.04)
WBC # BLD AUTO: 5.5 K/UL (ref 4.8–10.8)

## 2018-09-24 PROCEDURE — 93005 ELECTROCARDIOGRAM TRACING: CPT | Performed by: EMERGENCY MEDICINE

## 2018-09-24 PROCEDURE — 85379 FIBRIN DEGRADATION QUANT: CPT

## 2018-09-24 PROCEDURE — 93005 ELECTROCARDIOGRAM TRACING: CPT

## 2018-09-24 PROCEDURE — A9270 NON-COVERED ITEM OR SERVICE: HCPCS | Performed by: EMERGENCY MEDICINE

## 2018-09-24 PROCEDURE — 85730 THROMBOPLASTIN TIME PARTIAL: CPT

## 2018-09-24 PROCEDURE — 83690 ASSAY OF LIPASE: CPT

## 2018-09-24 PROCEDURE — 700102 HCHG RX REV CODE 250 W/ 637 OVERRIDE(OP): Performed by: EMERGENCY MEDICINE

## 2018-09-24 PROCEDURE — 71045 X-RAY EXAM CHEST 1 VIEW: CPT

## 2018-09-24 PROCEDURE — 85025 COMPLETE CBC W/AUTO DIFF WBC: CPT

## 2018-09-24 PROCEDURE — 99285 EMERGENCY DEPT VISIT HI MDM: CPT

## 2018-09-24 PROCEDURE — 83880 ASSAY OF NATRIURETIC PEPTIDE: CPT

## 2018-09-24 PROCEDURE — 84484 ASSAY OF TROPONIN QUANT: CPT

## 2018-09-24 PROCEDURE — 80053 COMPREHEN METABOLIC PANEL: CPT

## 2018-09-24 PROCEDURE — 85610 PROTHROMBIN TIME: CPT

## 2018-09-24 RX ORDER — PIOGLITAZONEHYDROCHLORIDE 30 MG/1
30 TABLET ORAL DAILY
COMMUNITY
End: 2019-02-04

## 2018-09-24 RX ORDER — LOSARTAN POTASSIUM 100 MG/1
100 TABLET ORAL DAILY
COMMUNITY
End: 2020-04-22

## 2018-09-24 RX ORDER — FAMOTIDINE 20 MG/1
20 TABLET, FILM COATED ORAL 2 TIMES DAILY
Qty: 20 TAB | Refills: 0 | Status: SHIPPED | OUTPATIENT
Start: 2018-09-24 | End: 2018-11-12

## 2018-09-24 RX ADMIN — LIDOCAINE HYDROCHLORIDE 30 ML: 20 SOLUTION OROPHARYNGEAL at 13:52

## 2018-09-24 ASSESSMENT — ENCOUNTER SYMPTOMS
ABDOMINAL PAIN: 0
BLURRED VISION: 1
SHORTNESS OF BREATH: 1
NAUSEA: 0
NECK PAIN: 0
DIARRHEA: 0
VOMITING: 0

## 2018-09-24 ASSESSMENT — PAIN SCALES - GENERAL
PAINLEVEL_OUTOF10: 6
PAINLEVEL_OUTOF10: 8

## 2018-09-24 NOTE — ED PROVIDER NOTES
ED Provider Note    Scribed for Angel Keenan M.D. by Danette Lloyd. 9/24/2018, 1:24 PM.    Primary care provider: Pcp Pt States None  Means of arrival: walk-in  History obtained from: patient  History limited by: none    CHIEF COMPLAINT  Chief Complaint   Patient presents with   • Chest Pain     midsternal, non-radiating sharp pain intermittent since saturday       HPI  Tanya Gaston is a 43 y.o. female who presents to the Emergency Department for evaluation of constant left sided non radiating chest pain onset 2 days ago with associated shortness of breath and blurry vision. Patient reports that she has experienced this chest pain in the past, however, in the center chest, not in the left chest like she is experiencing today. Patient does have extensive family cardiac history but no personal cardiac history. She does have a history of diabetes, and hypertension. Abdominal surgery history include hysterectomy. Patient does not take any hormone replacement, denies drug, alcohol, or tobacco use. She has not taken any recent aspirin, and denies any excessive antiinflammatory use  No complaints of neck pain, jaw pain, arm pain, nausea, vomiting, abdominal pain, diarrhea.    REVIEW OF SYSTEMS  Review of Systems   Eyes: Positive for blurred vision.   Respiratory: Positive for shortness of breath.    Cardiovascular: Positive for chest pain ( without radiation).   Gastrointestinal: Negative for abdominal pain, diarrhea, nausea and vomiting.   Musculoskeletal: Negative for neck pain.        Negative jaw pain or extremity pain   All other systems reviewed and are negative.      PAST MEDICAL HISTORY   has a past medical history of Anemia; Blood clotting disorder (HCC); Diabetes; Heart burn; Hemorrhagic disorder (HCC); Hypertension (2006); Urinary bladder disorder; and Urinary incontinence.    SURGICAL HISTORY   has a past surgical history that includes other; bladder sling female (10/25/2016); bladder  "sling female (4/11/2017); bladder sling female (10/17/2017); cystoscopy (N/A, 10/17/2017); vaginal hysterectomy scope total (10/25/2016); salpingectomy (Bilateral, 10/25/2016); anterior and posterior repair (10/25/2016); enterocele repair (10/25/2016); vaginal suspension (10/25/2016); anterior and posterior repair (4/11/2017); enterocele repair (4/11/2017); vaginal suspension (4/11/2017); anterior and posterior repair (10/17/2017); enterocele repair (10/17/2017); vaginal suspension (10/17/2017); anterior and posterior repair (2/22/2018); enterocele repair (2/22/2018); vaginal suspension (N/A, 2/22/2018); and bladder sling female (N/A, 2/22/2018).    SOCIAL HISTORY  Social History   Substance Use Topics   • Smoking status: Never Smoker   • Smokeless tobacco: Never Used   • Alcohol use No      History   Drug Use No       FAMILY HISTORY  Family History   Problem Relation Age of Onset   • Diabetes Mother         pills   • Hypertension Mother    • Diabetes Father         insulin   • Diabetes Paternal Grandmother    • Diabetes Paternal Aunt        CURRENT MEDICATIONS  Home Medications     Reviewed by Lexi Daniel R.N. (Registered Nurse) on 09/24/18 at 1303  Med List Status: Partial   Medication Last Dose Status   aspirin EC (ECOTRIN) 81 MG Tablet Delayed Response 2/15/2018 Active   Insulin Glargine (TOUJEO MAX SOLOSTAR) 300 UNIT/ML Solution Pen-injector  Active   losartan (COZAAR) 100 MG Tab  Active   NON SPECIFIED  Active   pioglitazone (ACTOS) 30 MG Tab  Active                ALLERGIES  Allergies   Allergen Reactions   • Latex      Condons, rash   • Metformin Rash     Rash         PHYSICAL EXAM  VITAL SIGNS: BP (!) 193/106   Pulse (!) 102   Temp 36.3 °C (97.3 °F) (Temporal)   Resp 18   Ht 1.626 m (5' 4\")   Wt 103.6 kg (228 lb 6.3 oz)   LMP 10/13/2016 (Exact Date)   SpO2 97%   BMI 39.20 kg/m²     Constitutional:  Mild distress  HENT:  Moist mucous membranes  Eyes:  No conjunctivitis or icterus  Neck:  " trachea is midline, no palpable thyroid  Lymphatic:  No cervical lymphadenopathy  Cardiovascular:  Regular rate and rhythm, no murmurs  Thorax & Lungs:  Normal breath sounds, no rhonchi  Abdomen:  Soft, Non-tender  Skin:.  no rash  Back:  Non-tender, no CVA tenderness  Extremities:   no edema  Vascular:  symmetric radial pulse  Neurologic:  Normal gross motor    LABS  Labs Reviewed   CBC WITH DIFFERENTIAL - Abnormal; Notable for the following:        Result Value    MCHC 33.0 (*)     All other components within normal limits    Narrative:     Indicate which anticoagulants the patient is on:->UNKNOWN   COMP METABOLIC PANEL - Abnormal; Notable for the following:     Glucose 170 (*)     All other components within normal limits    Narrative:     Indicate which anticoagulants the patient is on:->UNKNOWN   TROPONIN    Narrative:     Indicate which anticoagulants the patient is on:->UNKNOWN   BTYPE NATRIURETIC PEPTIDE    Narrative:     Indicate which anticoagulants the patient is on:->UNKNOWN   PROTHROMBIN TIME    Narrative:     Indicate which anticoagulants the patient is on:->UNKNOWN   APTT    Narrative:     Indicate which anticoagulants the patient is on:->UNKNOWN   LIPASE    Narrative:     Indicate which anticoagulants the patient is on:->UNKNOWN   D-DIMER   ESTIMATED GFR    Narrative:     Indicate which anticoagulants the patient is on:->UNKNOWN     All labs reviewed by me.    EKG Interpretation  Interpreted by me  Normal Sinus Rhythm. Rate 83  Normal corrected QTc, QRS, Axis  Inferior Q waves, normal ST segments  unchanged from previous    RADIOLOGY  DX-CHEST-LIMITED (1 VIEW)   Final Result         No acute cardiopulmonary abnormalities are identified.        The radiologist's interpretation of all radiological studies have been reviewed by me.    COURSE & MEDICAL DECISION MAKING  Pertinent Labs & Imaging studies reviewed. (See chart for details)    1:24 PM - Patient seen and examined at bedside. Patient will be  treated with GI cocktail. Ordered chest xray, DDimer, Troponin, BNP, CBC, CMP, PTT, APTT, lipase and EKG to evaluate her symptoms. The differential diagnoses include but are not limited to: atypical chest pain rule out PE, cardiac ischemia, pneumonia, reflux. I informed the patient that I would evaluate for acute origins of her chest discomfort here in the ED with imaging and lab work, and attempt to manage her symptoms. Patient understands and agrees with treatment plan.    3:48 PM I re-evaluated patient at bedside. She reports minimal improvement with the GI cocktail. I informed her that her work up is relatively normal at this time with no acute cardiac processes noted. I explained that I would place her on an antacid, advising only Tylenol for pain management, explaining that I would call to schedule her for a cardiac stress test to be completed in the coming week. She does have a follow up Cardiology appointment Oct 3rd. Patient understands and agrees with discharge at this time.     The patient will return for new or worsening symptoms and is stable at the time of discharge.    Patient has an atypical constant chest pain with a nondetectable d-dimer.  She does have cardiology follow-up.  I am arranging for her to get a stress test before that follow-up.  She is given return precautions.  She will be empirically treated with Pepcid and Tylenol only for pain.    DISPOSITION:  Patient will be discharged home in stable condition.    FOLLOW UP:  [address] [address] [phone]          OUTPATIENT MEDICATIONS:  New Prescriptions    FAMOTIDINE (PEPCID) 20 MG TAB    Take 1 Tab by mouth 2 times a day.         FINAL IMPRESSION  Chest pain     Danette BRITTON (Melody), am scribing for, and in the presence of, Angel Keenan M.D..    Electronically signed by: Danette Lloyd (Melody), 9/24/2018    Angel BRITTON M.D. personally performed the services  described in this documentation, as scribed by Danette Lloyd in my presence, and it is both accurate and complete.    The note accurately reflects work and decisions made by me.  Angel Keenan  9/24/2018  7:49 PM

## 2018-09-24 NOTE — ED NOTES
Pt has been provided written and verbal education on CP. She will keep scheduled stress test oct 2nd and follow up with cardiology oct 3rd. She verbalized understanding of worsening s/s and when to return to ED. She has been provided written prescription and verbalizes to pick that medication up from her pharmacy.

## 2018-09-24 NOTE — ED TRIAGE NOTES
Pt ambulatory to triage c/o midsternal, non-radiating sharp chest pain, intermittent since Saturday.  EKG done.    Pt hypertensive in triage, has hx HTN & did NOT take her medications today.

## 2018-09-24 NOTE — ED NOTES
[place] [facility] Fall Risk Assessment Tool     Present to ED because of fall  NO  (Syncope, seizure, or ALOC)  Age>70   NO  Altered Mental Status:  (Intoxicated with Alcohol or Substance Confusion,  Inability to follow instructions, disorientation)   NO  Impaired Mobility:  Ambulates or transfers with assistive devices or assist  Ambulates with unsteady gait and no assistance  Unable to ambulate or transfer   NO  Nursing Judgement  (Bowel or bladder incontinence, diarrhea, urinary   frequency or urgency, leg weakness, orthostatic   hypotension, dizziness or vertigo, narcotic use).   NO

## 2018-10-02 ENCOUNTER — NON-PROVIDER VISIT (OUTPATIENT)
Dept: CARDIOLOGY | Facility: MEDICAL CENTER | Age: 43
End: 2018-10-02
Attending: EMERGENCY MEDICINE
Payer: MEDICAID

## 2018-10-02 VITALS
BODY MASS INDEX: 38.93 KG/M2 | DIASTOLIC BLOOD PRESSURE: 86 MMHG | HEART RATE: 73 BPM | OXYGEN SATURATION: 99 % | WEIGHT: 228 LBS | HEIGHT: 64 IN | SYSTOLIC BLOOD PRESSURE: 134 MMHG

## 2018-10-02 DIAGNOSIS — R07.9 CHEST PAIN, UNSPECIFIED TYPE: ICD-10-CM

## 2018-10-02 LAB — TREADMILL STRESS: NORMAL

## 2018-10-02 PROCEDURE — 93015 CV STRESS TEST SUPVJ I&R: CPT | Performed by: INTERNAL MEDICINE

## 2018-10-03 ENCOUNTER — OFFICE VISIT (OUTPATIENT)
Dept: CARDIOLOGY | Facility: MEDICAL CENTER | Age: 43
End: 2018-10-03
Payer: MEDICAID

## 2018-10-03 VITALS
WEIGHT: 224 LBS | HEART RATE: 88 BPM | OXYGEN SATURATION: 98 % | SYSTOLIC BLOOD PRESSURE: 124 MMHG | BODY MASS INDEX: 38.24 KG/M2 | HEIGHT: 64 IN | DIASTOLIC BLOOD PRESSURE: 82 MMHG

## 2018-10-03 DIAGNOSIS — I20.89 STABLE ANGINA PECTORIS: ICD-10-CM

## 2018-10-03 DIAGNOSIS — E11.9 TYPE 2 DIABETES MELLITUS WITHOUT COMPLICATION, WITH LONG-TERM CURRENT USE OF INSULIN (HCC): ICD-10-CM

## 2018-10-03 DIAGNOSIS — R00.2 PALPITATIONS: ICD-10-CM

## 2018-10-03 DIAGNOSIS — I15.0 RENOVASCULAR HYPERTENSION: ICD-10-CM

## 2018-10-03 DIAGNOSIS — Z79.4 TYPE 2 DIABETES MELLITUS WITHOUT COMPLICATION, WITH LONG-TERM CURRENT USE OF INSULIN (HCC): ICD-10-CM

## 2018-10-03 PROCEDURE — 99214 OFFICE O/P EST MOD 30 MIN: CPT | Performed by: INTERNAL MEDICINE

## 2018-10-03 ASSESSMENT — ENCOUNTER SYMPTOMS
FEVER: 0
CLAUDICATION: 0
BRUISES/BLEEDS EASILY: 0
WEIGHT LOSS: 0
DEPRESSION: 0
DIZZINESS: 0
HEARTBURN: 0
COUGH: 0
PALPITATIONS: 0
EYES NEGATIVE: 1
LOSS OF CONSCIOUSNESS: 0
SHORTNESS OF BREATH: 0
NAUSEA: 0
INSOMNIA: 1
PND: 0
NERVOUS/ANXIOUS: 1

## 2018-10-16 ENCOUNTER — OFFICE VISIT (OUTPATIENT)
Dept: CARDIOLOGY | Facility: MEDICAL CENTER | Age: 43
End: 2018-10-16
Payer: MEDICAID

## 2018-10-16 VITALS
HEART RATE: 64 BPM | WEIGHT: 229 LBS | HEIGHT: 64 IN | BODY MASS INDEX: 39.09 KG/M2 | DIASTOLIC BLOOD PRESSURE: 80 MMHG | SYSTOLIC BLOOD PRESSURE: 160 MMHG | OXYGEN SATURATION: 96 %

## 2018-10-16 DIAGNOSIS — I15.0 RENOVASCULAR HYPERTENSION: ICD-10-CM

## 2018-10-16 DIAGNOSIS — R00.2 PALPITATIONS: ICD-10-CM

## 2018-10-16 DIAGNOSIS — I20.89 STABLE ANGINA PECTORIS: ICD-10-CM

## 2018-10-16 PROCEDURE — 99214 OFFICE O/P EST MOD 30 MIN: CPT | Performed by: INTERNAL MEDICINE

## 2018-10-16 RX ORDER — AMLODIPINE BESYLATE 5 MG/1
5 TABLET ORAL DAILY
Qty: 30 TAB | Refills: 3 | Status: SHIPPED | OUTPATIENT
Start: 2018-10-16 | End: 2018-11-12 | Stop reason: SDUPTHER

## 2018-10-16 ASSESSMENT — ENCOUNTER SYMPTOMS
DIZZINESS: 0
WEIGHT LOSS: 0
SHORTNESS OF BREATH: 0
CLAUDICATION: 0
INSOMNIA: 0
LOSS OF CONSCIOUSNESS: 0
BRUISES/BLEEDS EASILY: 0
PALPITATIONS: 0
EYES NEGATIVE: 1
DEPRESSION: 0
NERVOUS/ANXIOUS: 0
NAUSEA: 0
PND: 0
FEVER: 0
COUGH: 0
HEARTBURN: 0

## 2018-10-16 NOTE — PROGRESS NOTES
Subjective:   Tanya Gaston is a 43y.o. female who presents today in follow-up after her emergency room visit again for chest discomfort    Still feels well, got over a cold from last week.  Was taking TheraFlu but not for 5 days.  Does not check blood pressure at home.  Feels safe in her workplace.  Compliant with her medicines      Past Medical History:   Diagnosis Date   • Anemia    • Blood clotting disorder (HCC)    • Diabetes     Oral meds   • Heart burn    • Hemorrhagic disorder (HCC)    • Hypertension 2006    Medication   • Urinary bladder disorder    • Urinary incontinence      Past Surgical History:   Procedure Laterality Date   • ANTERIOR AND POSTERIOR REPAIR  2/22/2018    Procedure: ANTERIOR AND POSTERIOR REPAIR;  Surgeon: Angel Hernadez M.D.;  Location: SURGERY SAME DAY [facility];  Service: Gynecology   • ENTEROCELE REPAIR  2/22/2018    Procedure: ENTEROCELE REPAIR;  Surgeon: Angel Hernadez M.D.;  Location: SURGERY SAME DAY [facility];  Service: Gynecology   • VAGINAL SUSPENSION N/A 2/22/2018    Procedure: VAGINAL SUSPENSION- SACROSPINOUS VAULT;  Surgeon: Angel Hernadez M.D.;  Location: SURGERY SAME DAY [facility];  Service: Gynecology   • BLADDER SLING FEMALE N/A 2/22/2018    Procedure: BLADDER SLING FEMALE- TENSION FREE VAGINAL TAPE;  Surgeon: Angel Hernadez M.D.;  Location: SURGERY SAME DAY [facility];  Service: Gynecology   • BLADDER SLING FEMALE  10/17/2017    Procedure: BLADDER SLING FEMALE - TENSION FREE TAPE PROCEDURE;  Surgeon: Angel Hernadez M.D.;  Location: SURGERY SAME DAY [facility];  Service: Gynecology   • CYSTOSCOPY N/A 10/17/2017    Procedure: CYSTOSCOPY;  Surgeon: Angel Hernadez M.D.;  Location: SURGERY SAME DAY [facility];  Service: Gynecology   • ANTERIOR AND POSTERIOR REPAIR  10/17/2017    Procedure: ANTERIOR AND POSTERIOR REPAIR W/UPHOLD MESH;  Surgeon: Angel Hernadez M.D.;  Location: SURGERY SAME DAY [facility];  Service: Gynecology   •  ENTEROCELE REPAIR  10/17/2017    Procedure: ENTEROCELE REPAIR - PERINEOPLASTY;  Surgeon: Angel Hernadez M.D.;  Location: SURGERY SAME DAY [facility];  Service: Gynecology   • VAGINAL SUSPENSION  10/17/2017    Procedure: VAGINAL SUSPENSION - SACROSPINOUS VAULT;  Surgeon: Angel Hernadez M.D.;  Location: SURGERY SAME DAY [facility];  Service: Gynecology   • BLADDER SLING FEMALE  4/11/2017    Procedure: BLADDER SLING FEMALE-TOT;  Surgeon: Angel Hernadez M.D.;  Location: SURGERY SAME DAY [facility];  Service:    • ANTERIOR AND POSTERIOR REPAIR  4/11/2017    Procedure: ANTERIOR AND POSTERIOR REPAIR;  Surgeon: Angel Hernadez M.D.;  Location: SURGERY SAME DAY [facility];  Service:    • ENTEROCELE REPAIR  4/11/2017    Procedure: ENTEROCELE REPAIR- PERINEOPLASTY;  Surgeon: Angel Hernadez M.D.;  Location: SURGERY SAME DAY [facility];  Service:    • VAGINAL SUSPENSION  4/11/2017    Procedure: VAGINAL SUSPENSION-SACROSPINOUS VAULT;  Surgeon: Angel Hernadez M.D.;  Location: SURGERY SAME DAY [facility];  Service:    • BLADDER SLING FEMALE  10/25/2016    Procedure: BLADDER SLING FEMALE TOT;  Surgeon: Angel Hernadez M.D.;  Location: SURGERY SAME DAY [facility];  Service:    • VAGINAL HYSTERECTOMY SCOPE TOTAL  10/25/2016    Procedure: VAGINAL HYSTERECTOMY SCOPE TOTAL;  Surgeon: Angel Hernadez M.D.;  Location: SURGERY SAME DAY [facility];  Service:    • SALPINGECTOMY Bilateral 10/25/2016    Procedure: SALPINGECTOMY;  Surgeon: Angel Hernadez M.D.;  Location: SURGERY SAME DAY [facility];  Service:    • ANTERIOR AND POSTERIOR REPAIR  10/25/2016    Procedure: ANTERIOR AND POSTERIOR REPAIR;  Surgeon: Angel Hernadez M.D.;  Location: SURGERY SAME DAY [facility];  Service:    • ENTEROCELE REPAIR  10/25/2016    Procedure: ENTEROCELE REPAIR, PERINEOPLASTY;  Surgeon: Angel Hernadez M.D.;  Location: SURGERY SAME DAY [facility];  Service:    • VAGINAL SUSPENSION  10/25/2016    Procedure: VAGINAL  SUSPENSION SACROSPINOUS VAULT ;  Surgeon: Angel Hernadez M.D.;  Location: SURGERY SAME DAY [facility];  Service:    • OTHER      Tubal ligation     Family History   Problem Relation Age of Onset   • Diabetes Mother         pills   • Hypertension Mother    • Diabetes Father         insulin   • Diabetes Paternal Grandmother    • Diabetes Paternal Aunt      History   Smoking Status   • Never Smoker   Smokeless Tobacco   • Never Used     Allergies   Allergen Reactions   • Latex      Condons, rash   • Metformin Rash     Rash       Outpatient Encounter Prescriptions as of 10/16/2018   Medication Sig Dispense Refill   • amLODIPine (NORVASC) 5 MG Tab Take 1 Tab by mouth every day. 30 Tab 3   • sertraline (ZOLOFT) 50 MG Tab Take 1 Tab by mouth every day. 30 Tab 11   • pioglitazone (ACTOS) 30 MG Tab Take 30 mg by mouth every day.     • losartan (COZAAR) 100 MG Tab Take 100 mg by mouth every day.     • Insulin Glargine (TOUJEO MAX SOLOSTAR) 300 UNIT/ML Solution Pen-injector Inject 15 Units as instructed every day.     • famotidine (PEPCID) 20 MG Tab Take 1 Tab by mouth 2 times a day. 20 Tab 0   • aspirin EC (ECOTRIN) 81 MG Tablet Delayed Response Take 81 mg by mouth every morning.     • [DISCONTINUED] NON SPECIFIED Inject 15 Units as instructed every bedtime. Long acting insulin       No facility-administered encounter medications on file as of 10/16/2018.      Review of Systems   Constitutional: Negative for fever, malaise/fatigue and weight loss.   HENT: Negative for hearing loss.    Eyes: Negative.    Respiratory: Negative for cough and shortness of breath.    Cardiovascular: Negative for chest pain, palpitations, claudication, leg swelling and PND.   Gastrointestinal: Negative for heartburn and nausea.   Skin: Negative for rash.   Neurological: Negative for dizziness and loss of consciousness.   Endo/Heme/Allergies: Does not bruise/bleed easily.   Psychiatric/Behavioral: Negative for depression and suicidal ideas. The  "patient is not nervous/anxious and does not have insomnia.    All other systems reviewed and are negative.       Objective:   /80 (BP Location: Left arm, Patient Position: Sitting)   Pulse 64   Ht 1.626 m (5' 4\")   Wt 103.9 kg (229 lb)   LMP 10/13/2016 (Exact Date)   SpO2 96%   BMI 39.31 kg/m²     Physical Exam   Constitutional: She appears well-developed and well-nourished. No distress.   Patient seen and examined again today changes noted as below -today is Tuesday, October 16   HENT:   Head: Normocephalic and atraumatic.   Mouth/Throat: No oropharyngeal exudate.   Eyes: Pupils are equal, round, and reactive to light. EOM are normal. No scleral icterus.   Neck: No JVD present. No thyromegaly present.   Cardiovascular: Normal rate, regular rhythm and intact distal pulses.  Exam reveals no gallop.    No murmur heard.  Pulmonary/Chest: Breath sounds normal. No respiratory distress.   Abdominal: Bowel sounds are normal. She exhibits no distension.   Musculoskeletal: She exhibits no edema.   Neurological: She is alert. She exhibits normal muscle tone. Coordination normal.   Skin: Skin is warm and dry. She is not diaphoretic. No pallor.   Psychiatric: She has a normal mood and affect. Her behavior is normal.       Assessment:     1. Renovascular hypertension     2. Palpitations     3. Stable angina pectoris (HCC)         Medical Decision Making:  Today's Assessment / Status / Plan:     Chest discomfort, 2015 echo nuclear and EKGs are reviewed.   We looked at the tracings of her stress test from yesterday.  No evidence of underlying disease, my concern was her very elevated blood pressure     Abnormal EKG in the setting of symptoms  Low risk features, reassurance given as last time  No evidence of underlying heart disease on workup recent    Hypertension  This is her reason for coming here today, talked about TheraFlu and avoiding some blood pressure raising medicines  Readdress diet and weight loss  Reviewed " images of her echo again from last time, November 2017.  Normal and reassuring  Add amlodipine 5 mg at night.  Continue ARB    RTC 2-4 weeks, if blood pressure still consistently more than 140/80, increase amlodipine to 10 mg at night or add HCTZ 25, repeat BMP 1 week later    RTC as above   Physical Exam   Constitutional: She appears well-developed and well-nourished. No distress.   Patient seen and examined again today changes noted as below -today is Tuesday, October 16   HENT:   Head: Normocephalic and atraumatic.   Mouth/Throat: No oropharyngeal exudate.   Eyes: Pupils are equal, round, and reactive to light. EOM are normal. No scleral icterus.   Neck: No JVD present. No thyromegaly present.   Cardiovascular: Normal rate, regular rhythm and intact distal pulses.  Exam reveals no gallop.    No murmur heard.  Pulmonary/Chest: Breath sounds normal. No respiratory distress.   Abdominal: Bowel sounds are normal. She exhibits no distension.   Musculoskeletal: She exhibits no edema.   Neurological: She is alert. She exhibits normal muscle tone. Coordination normal.   Skin: Skin is warm and dry. She is not diaphoretic. No pallor.   Psychiatric: She has a normal mood and affect. Her behavior is normal.

## 2018-10-18 ENCOUNTER — APPOINTMENT (OUTPATIENT)
Dept: RADIOLOGY | Facility: MEDICAL CENTER | Age: 43
End: 2018-10-18
Attending: EMERGENCY MEDICINE
Payer: MEDICAID

## 2018-10-18 ENCOUNTER — HOSPITAL ENCOUNTER (EMERGENCY)
Facility: MEDICAL CENTER | Age: 43
End: 2018-10-18
Attending: EMERGENCY MEDICINE
Payer: MEDICAID

## 2018-10-18 VITALS
BODY MASS INDEX: 39.52 KG/M2 | OXYGEN SATURATION: 99 % | HEIGHT: 64 IN | RESPIRATION RATE: 12 BRPM | DIASTOLIC BLOOD PRESSURE: 73 MMHG | WEIGHT: 231.48 LBS | SYSTOLIC BLOOD PRESSURE: 162 MMHG | TEMPERATURE: 98.4 F | HEART RATE: 60 BPM

## 2018-10-18 DIAGNOSIS — M25.561 ACUTE PAIN OF RIGHT KNEE: ICD-10-CM

## 2018-10-18 PROCEDURE — 700102 HCHG RX REV CODE 250 W/ 637 OVERRIDE(OP): Performed by: EMERGENCY MEDICINE

## 2018-10-18 PROCEDURE — 99284 EMERGENCY DEPT VISIT MOD MDM: CPT

## 2018-10-18 PROCEDURE — 302874 HCHG BANDAGE ACE 2 OR 3""

## 2018-10-18 PROCEDURE — A9270 NON-COVERED ITEM OR SERVICE: HCPCS | Performed by: EMERGENCY MEDICINE

## 2018-10-18 PROCEDURE — 73564 X-RAY EXAM KNEE 4 OR MORE: CPT | Mod: RT

## 2018-10-18 RX ORDER — IBUPROFEN 600 MG/1
600 TABLET ORAL ONCE
Status: COMPLETED | OUTPATIENT
Start: 2018-10-18 | End: 2018-10-18

## 2018-10-18 RX ADMIN — IBUPROFEN 600 MG: 600 TABLET, FILM COATED ORAL at 11:40

## 2018-10-18 ASSESSMENT — PAIN SCALES - GENERAL: PAINLEVEL_OUTOF10: 8

## 2018-10-18 NOTE — ED PROVIDER NOTES
ED Provider Note    Scribed for Ganesh Akins M.D. by Thierno Kelsey. 10/18/2018  10:37 AM    Primary care provider: Pcp Pt States None  Means of arrival: walk in  History obtained from: patient  History limited by: none    CHIEF COMPLAINT  Chief Complaint   Patient presents with   • Knee Pain     right knee x 3 days       HPI  Tanya Gaston is a 43 y.o. female who presents to the Emergency Department complaining of right knee pain from earlier today.  The patient states she was walking, felt a click in her knee, then had some swelling in her knee.  She has sharp severe pain worse with walking worse with touch.  She did not hit her head did not fall down.  No other injuries.      REVIEW OF SYSTEMS  Pertinent positives include knee pain. Pertinent negatives include no trauma, distal numbness, focal weakness, hip pain.      PAST MEDICAL HISTORY   has a past medical history of Anemia; Blood clotting disorder (HCC); Diabetes; Heart burn; Hemorrhagic disorder (HCC); Hypertension (2006); Urinary bladder disorder; and Urinary incontinence.    SURGICAL HISTORY   has a past surgical history that includes other; bladder sling female (10/25/2016); bladder sling female (4/11/2017); bladder sling female (10/17/2017); cystoscopy (N/A, 10/17/2017); vaginal hysterectomy scope total (10/25/2016); salpingectomy (Bilateral, 10/25/2016); anterior and posterior repair (10/25/2016); enterocele repair (10/25/2016); vaginal suspension (10/25/2016); anterior and posterior repair (4/11/2017); enterocele repair (4/11/2017); vaginal suspension (4/11/2017); anterior and posterior repair (10/17/2017); enterocele repair (10/17/2017); vaginal suspension (10/17/2017); anterior and posterior repair (2/22/2018); enterocele repair (2/22/2018); vaginal suspension (N/A, 2/22/2018); and bladder sling female (N/A, 2/22/2018).    SOCIAL HISTORY  Social History   Substance Use Topics   • Smoking status: Never Smoker   • Smokeless  "tobacco: Never Used   • Alcohol use No      History   Drug Use No       FAMILY HISTORY  Family History   Problem Relation Age of Onset   • Diabetes Mother         pills   • Hypertension Mother    • Diabetes Father         insulin   • Diabetes Paternal Grandmother    • Diabetes Paternal Aunt        CURRENT MEDICATIONS  Current Outpatient Prescriptions:   •  amLODIPine (NORVASC) 5 MG Tab, Take 1 Tab by mouth every day., Disp: 30 Tab, Rfl: 3  •  sertraline (ZOLOFT) 50 MG Tab, Take 1 Tab by mouth every day., Disp: 30 Tab, Rfl: 11  •  pioglitazone (ACTOS) 30 MG Tab, Take 30 mg by mouth every day., Disp: , Rfl:   •  losartan (COZAAR) 100 MG Tab, Take 100 mg by mouth every day., Disp: , Rfl:   •  Insulin Glargine (TOUJEO MAX SOLOSTAR) 300 UNIT/ML Solution Pen-injector, Inject 15 Units as instructed every day., Disp: , Rfl:   •  famotidine (PEPCID) 20 MG Tab, Take 1 Tab by mouth 2 times a day., Disp: 20 Tab, Rfl: 0  •  aspirin EC (ECOTRIN) 81 MG Tablet Delayed Response, Take 81 mg by mouth every morning., Disp: , Rfl:     ALLERGIES  Allergies   Allergen Reactions   • Latex      Condons, rash   • Metformin Rash     Rash         PHYSICAL EXAM  VITAL SIGNS: BP (!) 184/105   Pulse 75   Temp 36.5 °C (97.7 °F)   Resp 18   Ht 1.626 m (5' 4\")   Wt 105 kg (231 lb 7.7 oz)   LMP 10/13/2016 (Exact Date)   SpO2 99%   BMI 39.73 kg/m²     Constitutional: Well developed, Well nourished, mild distress, Non-toxic appearance.   HENT: Normocephalic, Atraumatic, Bilateral external ears normal, Oropharynx moist, No oral exudates.   Eyes: PERRLA, EOMI, Conjunctiva normal, No discharge.      Skin: Warm, Dry, No erythema, No rash.   Extremities:, No edema No cyanosis.   Musculoskeletal: No major deformities noted.  Intact distal pulses.  Mild soft tissue swelling and tenderness palpation throughout the right knee, mild effusion, decreased range of motion secondary to pain, normal sensation distally  Neurologic: Awake, alert. Moves all " extremities spontaneously.  Psychiatric: Affect normal, Judgment normal, Mood normal.     RADIOLOGY  DX-KNEE COMPLETE 4+ RIGHT   Final Result      1.  No evidence of acute fracture or dislocation.      2.  Mild degenerative change.      The radiologist's interpretation of all radiological studies have been reviewed by me.    COURSE & MEDICAL DECISION MAKING  Pertinent Labs & Imaging studies reviewed. (See chart for details)    10:37 AM - Patient seen and examined at bedside. Patient will be treated with Motrin 600 mg. Ordered DX knee to evaluate her symptoms.     Decision Making:  With right knee pain, x-rays are negative, likely secondary to arthritis, will put the patient in in a Ace bandage, knee immobilizer, crutches, have the patient follow-up with Dr. Castelan as an outpatient, have the patient return with any other concerns.      The patient will return for new or worsening symptoms and is stable at the time of discharge.    The patient is referred to a primary physician for blood pressure management, diabetic screening, and for all other preventative health concerns.    DISPOSITION:  Patient will be discharged home in stable condition.    FOLLOW UP:  [facility], Emergency Dept  [address] [place] [id number] [phone]    If symptoms worsen    Sony Castelan M.D.  [address] [facility] [address]  [phone]            OUTPATIENT MEDICATIONS:  New Prescriptions    No medications on file         FINAL IMPRESSION  1. Acute pain of right knee          Thierno BRITTON (Scribe), am scribing for, and in the presence of, Ganesh Akins M.D..    Electronically signed by: Thierno Kelsey (Melody), 10/18/2018    Ganesh BRITTON M.D. personally performed the services described in this documentation, as scribed by Thierno Kelsey in my presence, and it is both accurate and complete. E    The note accurately reflects work and decisions made by me.  Ganesh Akins   10/18/2018  12:06 PM

## 2018-10-18 NOTE — ED NOTES
Knee immobilizer applied by ed tech in right lle and provided w/crutches w/education.  Discharge instructions given to pt including follow up with orthopaedic doctor or returning if no improvement of symptoms or to return if worse. Questions answered by RN. Denies any new complaints. Discharged w/stable vitals and able to ambulate w/steady gait. Work note provided to pt.

## 2018-11-12 ENCOUNTER — OFFICE VISIT (OUTPATIENT)
Dept: CARDIOLOGY | Facility: MEDICAL CENTER | Age: 43
End: 2018-11-12
Payer: MEDICAID

## 2018-11-12 VITALS
HEIGHT: 64 IN | WEIGHT: 230 LBS | HEART RATE: 68 BPM | OXYGEN SATURATION: 96 % | BODY MASS INDEX: 39.27 KG/M2 | SYSTOLIC BLOOD PRESSURE: 124 MMHG | DIASTOLIC BLOOD PRESSURE: 88 MMHG

## 2018-11-12 DIAGNOSIS — E11.9 TYPE 2 DIABETES MELLITUS WITHOUT COMPLICATION, WITH LONG-TERM CURRENT USE OF INSULIN (HCC): ICD-10-CM

## 2018-11-12 DIAGNOSIS — Z79.4 TYPE 2 DIABETES MELLITUS WITHOUT COMPLICATION, WITH LONG-TERM CURRENT USE OF INSULIN (HCC): ICD-10-CM

## 2018-11-12 DIAGNOSIS — I20.89 STABLE ANGINA PECTORIS: ICD-10-CM

## 2018-11-12 DIAGNOSIS — I15.0 RENOVASCULAR HYPERTENSION: ICD-10-CM

## 2018-11-12 DIAGNOSIS — R00.2 PALPITATIONS: ICD-10-CM

## 2018-11-12 PROCEDURE — 99214 OFFICE O/P EST MOD 30 MIN: CPT | Performed by: NURSE PRACTITIONER

## 2018-11-12 RX ORDER — ATORVASTATIN CALCIUM 20 MG/1
TABLET, FILM COATED ORAL
Refills: 3 | COMMUNITY
Start: 2018-10-18 | End: 2019-02-04

## 2018-11-12 RX ORDER — AMLODIPINE BESYLATE 5 MG/1
5 TABLET ORAL DAILY
Qty: 90 TAB | Refills: 3 | Status: SHIPPED | OUTPATIENT
Start: 2018-11-12 | End: 2019-02-04

## 2018-11-12 ASSESSMENT — ENCOUNTER SYMPTOMS
BRUISES/BLEEDS EASILY: 0
CHILLS: 0
ABDOMINAL PAIN: 0
ORTHOPNEA: 0
COUGH: 0
PALPITATIONS: 0
FEVER: 0
HEADACHES: 0
NAUSEA: 0
DIZZINESS: 0
SHORTNESS OF BREATH: 0
PND: 0
MYALGIAS: 0
LOSS OF CONSCIOUSNESS: 0

## 2018-11-12 NOTE — PROGRESS NOTES
Chief Complaint   Patient presents with   • Follow-Up   • HTN (Controlled)   • Evaluation Of Medication Change       Subjective:   Tanya Gaston is a 43 y.o. female who presents today for 4 week follow-up of hypertension and medication addition evaluation.    Tanya is a 43 year old female with history of hypertension and diabetes. She had been seen in the ER in September 2018 for chest pain. Previous MPI and echocardiogram were normal.    At follow-up with Dr. WILL Watkins in October 2018, BP was still high; Amlodipine was added. She is here today for follow-up.    She is tolerating the Amlodipine without any problems; BP is better, running 125-130 at home. No further chest pain, pressure or discomfort; no palpitations; no shortness of breath, orthopnea or PND; no dizziness or syncope; no edema.    Past Medical History:   Diagnosis Date   • Anemia    • Blood clotting disorder (HCC)    • Chest pain 03/2015    MPI with no ischemia, LVEF 70%. November 2017: Echocardiogram with normal LV size, LVEF 65%. No valvular abnormalities.   • Diabetes     Oral meds   • Heart burn    • Hemorrhagic disorder (HCC)    • Hypertension 2006    Medication   • Urinary bladder disorder    • Urinary incontinence      Past Surgical History:   Procedure Laterality Date   • ANTERIOR AND POSTERIOR REPAIR  2/22/2018    Procedure: ANTERIOR AND POSTERIOR REPAIR;  Surgeon: Angel Hernadez M.D.;  Location: SURGERY SAME DAY [facility];  Service: Gynecology   • ENTEROCELE REPAIR  2/22/2018    Procedure: ENTEROCELE REPAIR;  Surgeon: Angel Hernadez M.D.;  Location: SURGERY SAME DAY [facility];  Service: Gynecology   • VAGINAL SUSPENSION N/A 2/22/2018    Procedure: VAGINAL SUSPENSION- SACROSPINOUS VAULT;  Surgeon: Angel Hernadez M.D.;  Location: SURGERY SAME DAY [facility];  Service: Gynecology   • BLADDER SLING FEMALE N/A 2/22/2018    Procedure: BLADDER SLING FEMALE- TENSION FREE VAGINAL TAPE;  Surgeon: Angel Hernadez  M.D.;  Location: SURGERY SAME DAY [facility];  Service: Gynecology   • BLADDER SLING FEMALE  10/17/2017    Procedure: BLADDER SLING FEMALE - TENSION FREE TAPE PROCEDURE;  Surgeon: Angel Hernadez M.D.;  Location: SURGERY SAME DAY [facility];  Service: Gynecology   • CYSTOSCOPY N/A 10/17/2017    Procedure: CYSTOSCOPY;  Surgeon: Angel Hernadez M.D.;  Location: SURGERY SAME DAY [facility];  Service: Gynecology   • ANTERIOR AND POSTERIOR REPAIR  10/17/2017    Procedure: ANTERIOR AND POSTERIOR REPAIR W/UPHOLD MESH;  Surgeon: Angel Hernadez M.D.;  Location: SURGERY SAME DAY [facility];  Service: Gynecology   • ENTEROCELE REPAIR  10/17/2017    Procedure: ENTEROCELE REPAIR - PERINEOPLASTY;  Surgeon: Angel Hernadez M.D.;  Location: SURGERY SAME DAY [facility];  Service: Gynecology   • VAGINAL SUSPENSION  10/17/2017    Procedure: VAGINAL SUSPENSION - SACROSPINOUS VAULT;  Surgeon: Angel Hernadez M.D.;  Location: SURGERY SAME DAY [facility];  Service: Gynecology   • BLADDER SLING FEMALE  4/11/2017    Procedure: BLADDER SLING FEMALE-TOT;  Surgeon: Angel Hernadez M.D.;  Location: SURGERY SAME DAY [facility];  Service:    • ANTERIOR AND POSTERIOR REPAIR  4/11/2017    Procedure: ANTERIOR AND POSTERIOR REPAIR;  Surgeon: Angel Hernadez M.D.;  Location: SURGERY SAME DAY [facility];  Service:    • ENTEROCELE REPAIR  4/11/2017    Procedure: ENTEROCELE REPAIR- PERINEOPLASTY;  Surgeon: Angel Hernadez M.D.;  Location: SURGERY SAME DAY [facility];  Service:    • VAGINAL SUSPENSION  4/11/2017    Procedure: VAGINAL SUSPENSION-SACROSPINOUS VAULT;  Surgeon: Angel Hernadez M.D.;  Location: SURGERY SAME DAY [facility];  Service:    • BLADDER SLING FEMALE  10/25/2016    Procedure: BLADDER SLING FEMALE TOT;  Surgeon: Angel Hernadez M.D.;  Location: SURGERY SAME DAY [facility];  Service:    • VAGINAL HYSTERECTOMY SCOPE TOTAL  10/25/2016    Procedure: VAGINAL HYSTERECTOMY SCOPE TOTAL;  Surgeon: Angel REBOLLEDO  CHARI Hernadez;  Location: SURGERY SAME DAY [facility];  Service:    • SALPINGECTOMY Bilateral 10/25/2016    Procedure: SALPINGECTOMY;  Surgeon: Angel Hernadez M.D.;  Location: SURGERY SAME DAY [facility];  Service:    • ANTERIOR AND POSTERIOR REPAIR  10/25/2016    Procedure: ANTERIOR AND POSTERIOR REPAIR;  Surgeon: Angel Hernadez M.D.;  Location: SURGERY SAME DAY [facility];  Service:    • ENTEROCELE REPAIR  10/25/2016    Procedure: ENTEROCELE REPAIR, PERINEOPLASTY;  Surgeon: Angel Hernadez M.D.;  Location: SURGERY SAME DAY [facility];  Service:    • VAGINAL SUSPENSION  10/25/2016    Procedure: VAGINAL SUSPENSION SACROSPINOUS VAULT ;  Surgeon: Angel Hernadez M.D.;  Location: SURGERY SAME DAY [facility];  Service:    • OTHER      Tubal ligation     Family History   Problem Relation Age of Onset   • Diabetes Mother         pills   • Hypertension Mother    • Diabetes Father         insulin   • Diabetes Paternal Grandmother    • Diabetes Paternal Aunt      Social History     Social History   • Marital status: Single     Spouse name: N/A   • Number of children: N/A   • Years of education: N/A     Occupational History   • Not on file.     Social History Main Topics   • Smoking status: Never Smoker   • Smokeless tobacco: Never Used   • Alcohol use No   • Drug use: No   • Sexual activity: Yes     Partners: Male     Other Topics Concern   • Not on file     Social History Narrative   • No narrative on file     Allergies   Allergen Reactions   • Latex      Condons, rash   • Metformin Rash     Rash       Outpatient Encounter Prescriptions as of 11/12/2018   Medication Sig Dispense Refill   • amLODIPine (NORVASC) 5 MG Tab Take 1 Tab by mouth every day. 90 Tab 3   • sertraline (ZOLOFT) 50 MG Tab Take 1 Tab by mouth every day. 30 Tab 11   • pioglitazone (ACTOS) 30 MG Tab Take 30 mg by mouth every day.     • losartan (COZAAR) 100 MG Tab Take 100 mg by mouth every day.     • Insulin Glargine (TOUJEO MAX SOLOSTAR)  "300 UNIT/ML Solution Pen-injector Inject 15 Units as instructed every day.     • aspirin EC (ECOTRIN) 81 MG Tablet Delayed Response Take 81 mg by mouth every morning.     • atorvastatin (LIPITOR) 20 MG Tab TK 1 T PO ONCE AT NIGHT FOR CHOLESTEROL  3   • [DISCONTINUED] amLODIPine (NORVASC) 5 MG Tab Take 1 Tab by mouth every day. 30 Tab 3   • [DISCONTINUED] famotidine (PEPCID) 20 MG Tab Take 1 Tab by mouth 2 times a day. (Patient not taking: Reported on 11/12/2018) 20 Tab 0     No facility-administered encounter medications on file as of 11/12/2018.      Review of Systems   Constitutional: Negative for chills and fever.   HENT: Negative for congestion.    Respiratory: Negative for cough and shortness of breath.    Cardiovascular: Negative for chest pain, palpitations, orthopnea, leg swelling and PND.   Gastrointestinal: Negative for abdominal pain and nausea.   Musculoskeletal: Negative for myalgias.   Skin: Negative for rash.   Neurological: Negative for dizziness, loss of consciousness and headaches.   Endo/Heme/Allergies: Does not bruise/bleed easily.        Objective:   /88 (BP Location: Right arm, Patient Position: Sitting, BP Cuff Size: Adult)   Pulse 68   Ht 1.626 m (5' 4\")   Wt 104.3 kg (230 lb)   LMP 10/13/2016 (Exact Date)   SpO2 96%   BMI 39.48 kg/m²     Physical Exam   Constitutional: She is oriented to person, place, and time. She appears well-developed and well-nourished.   HENT:   Head: Normocephalic.   Eyes: EOM are normal.   Neck: Normal range of motion. Neck supple. No JVD present.   Cardiovascular: Normal rate, regular rhythm and normal heart sounds.    Pulmonary/Chest: Effort normal and breath sounds normal. No respiratory distress. She has no wheezes. She has no rales.   Abdominal: Soft. Bowel sounds are normal. She exhibits no distension. There is no tenderness.   Musculoskeletal: Normal range of motion. She exhibits no edema.   Neurological: She is alert and oriented to person, place, " and time.   Skin: Skin is warm and dry. No rash noted.   Psychiatric: She has a normal mood and affect.     CONCLUSIONS OF ECHOCARDIOGRAM OF 11/20/2017:  No prior study is available for comparison.   Left ventricular ejection fraction is visually estimated to be 65%.  Normal inferior vena cava size and inspiratory collapse.  Normal transthoracic echocardiogram.     Lab Results   Component Value Date/Time    WBC 5.5 09/24/2018 01:09 PM    RBC 4.84 09/24/2018 01:09 PM    HEMOGLOBIN 13.8 09/24/2018 01:09 PM    HEMATOCRIT 41.8 09/24/2018 01:09 PM    MCV 86.4 09/24/2018 01:09 PM    MCH 28.5 09/24/2018 01:09 PM    MCHC 33.0 (L) 09/24/2018 01:09 PM    MPV 11.4 09/24/2018 01:09 PM      Lab Results   Component Value Date/Time    SODIUM 139 09/24/2018 01:09 PM    POTASSIUM 3.8 09/24/2018 01:09 PM    CHLORIDE 104 09/24/2018 01:09 PM    CO2 27 09/24/2018 01:09 PM    GLUCOSE 170 (H) 09/24/2018 01:09 PM    BUN 16 09/24/2018 01:09 PM    CREATININE 0.73 09/24/2018 01:09 PM    CREATININE 0.6 05/27/2007 12:01 AM        Assessment:     1. Renovascular hypertension  amLODIPine (NORVASC) 5 MG Tab   2. Palpitations     3. Stable angina pectoris (HCC)     4. Type 2 diabetes mellitus without complication, with long-term current use of insulin (HCC)         Medical Decision Making:  Today's Assessment / Status / Plan:     1. Hypertension, treated and improved. Continue Amlodipine and Losartan.    2. Palpitations, resolved.    3. History of chest pain, with normal MPI (2015) and echocardiogram (2017). No recurrence.    4. Diabetes mellitus, treated.    Same medications. FU with us PRN; also establish care with PCP.    Collaborating MD: Agustina

## 2018-11-12 NOTE — LETTER
[facility]   [address]   [address]   [place] [address]  Phone: [phone]  Fax: [phone]              Tanya Gaston  1975    Encounter Date: 11/12/2018    OTF [facility]as          PROGRESS NOTE:  Chief Complaint   Patient presents with   • Follow-Up   • HTN (Controlled)   • Evaluation Of Medication Change       Subjective:   Tanya Gaston is a 43 y.o. female who presents today for 4 week follow-up of hypertension and medication addition evaluation.    Tanya is a 43 year old female with history of hypertension and diabetes. She had been seen in the ER in September 2018 for chest pain. Previous MPI and echocardiogram were normal.    At follow-up with Dr. WILL Watkins in October 2018, BP was still high; Amlodipine was added. She is here today for follow-up.    She is tolerating the Amlodipine without any problems; BP is better, running 125-130 at home. No further chest pain, pressure or discomfort; no palpitations; no shortness of breath, orthopnea or PND; no dizziness or syncope; no edema.    Past Medical History:   Diagnosis Date   • Anemia    • Blood clotting disorder (HCC)    • Chest pain 03/2015    MPI with no ischemia, LVEF 70%. November 2017: Echocardiogram with normal LV size, LVEF 65%. No valvular abnormalities.   • Diabetes     Oral meds   • Heart burn    • Hemorrhagic disorder (HCC)    • Hypertension 2006    Medication   • Urinary bladder disorder    • Urinary incontinence      Past Surgical History:   Procedure Laterality Date   • ANTERIOR AND POSTERIOR REPAIR  2/22/2018    Procedure: ANTERIOR AND POSTERIOR REPAIR;  Surgeon: Angel Hernadez M.D.;  Location: SURGERY SAME DAY [facility]S;  Service: Gynecology   • ENTEROCELE REPAIR  2/22/2018    Procedure: ENTEROCELE REPAIR;  Surgeon: Angel Hernadez M.D.;  Location: SURGERY SAME DAY [facility] OR[facility];  Service: Gynecology   • VAGINAL  SUSPENSION N/A 2/22/2018    Procedure: VAGINAL SUSPENSION- SACROSPINOUS VAULT;  Surgeon: Angel Hernadez M.D.;  Location: SURGERY SAME DAY [facility];  Service: Gynecology   • BLADDER SLING FEMALE N/A 2/22/2018    Procedure: BLADDER SLING FEMALE- TENSION FREE VAGINAL TAPE;  Surgeon: Angel Hernadez M.D.;  Location: SURGERY SAME DAY [facility];  Service: Gynecology   • BLADDER SLING FEMALE  10/17/2017    Procedure: BLADDER SLING FEMALE - TENSION FREE TAPE PROCEDURE;  Surgeon: Angel Hernadez M.D.;  Location: SURGERY SAME DAY [facility];  Service: Gynecology   • CYSTOSCOPY N/A 10/17/2017    Procedure: CYSTOSCOPY;  Surgeon: Angel Hernadez M.D.;  Location: SURGERY SAME DAY [facility];  Service: Gynecology   • ANTERIOR AND POSTERIOR REPAIR  10/17/2017    Procedure: ANTERIOR AND POSTERIOR REPAIR W/UPHOLD MESH;  Surgeon: Angel Hernadez M.D.;  Location: SURGERY SAME DAY [facility];  Service: Gynecology   • ENTEROCELE REPAIR  10/17/2017    Procedure: ENTEROCELE REPAIR - PERINEOPLASTY;  Surgeon: Angel Hernadez M.D.;  Location: SURGERY SAME DAY [facility];  Service: Gynecology   • VAGINAL SUSPENSION  10/17/2017    Procedure: VAGINAL SUSPENSION - SACROSPINOUS VAULT;  Surgeon: Angel Hernadez M.D.;  Location: SURGERY SAME DAY [facility];  Service: Gynecology   • BLADDER SLING FEMALE  4/11/2017    Procedure: BLADDER SLING FEMALE-TOT;  Surgeon: Angel Hernadez M.D.;  Location: SURGERY SAME DAY [facility];  Service:    • ANTERIOR AND POSTERIOR REPAIR  4/11/2017    Procedure: ANTERIOR AND POSTERIOR REPAIR;  Surgeon: Angel Hernadez M.D.;  Location: SURGERY SAME DAY [facility];  Service:    • ENTEROCELE REPAIR  4/11/2017    Procedure: ENTEROCELE REPAIR- PERINEOPLASTY;  Surgeon: Angel Hernadez M.D.;  Location: SURGERY SAME DAY [facility];  Service:    • VAGINAL SUSPENSION  4/11/2017    Procedure: VAGINAL SUSPENSION-SACROSPINOUS VAULT;  Surgeon: Angel Hernadez M.D.;  Location: SURGERY SAME DAY  [facility] ORS;  Service:    • BLADDER SLING FEMALE  10/25/2016    Procedure: BLADDER SLING FEMALE TOT;  Surgeon: Angel Hernadez M.D.;  Location: SURGERY SAME DAY [facility] ORS;  Service:    • VAGINAL HYSTERECTOMY SCOPE TOTAL  10/25/2016    Procedure: VAGINAL HYSTERECTOMY SCOPE TOTAL;  Surgeon: Angel Hernadez M.D.;  Location: SURGERY SAME DAY [facility] ORS;  Service:    • SALPINGECTOMY Bilateral 10/25/2016    Procedure: SALPINGECTOMY;  Surgeon: Angel Hernadez M.D.;  Location: SURGERY SAME DAY [facility] OR[facility];  Service:    • ANTERIOR AND POSTERIOR REPAIR  10/25/2016    Procedure: ANTERIOR AND POSTERIOR REPAIR;  Surgeon: Angel Hernadez M.D.;  Location: SURGERY SAME DAY [facility];  Service:    • ENTEROCELE REPAIR  10/25/2016    Procedure: ENTEROCELE REPAIR, PERINEOPLASTY;  Surgeon: Angel Hernadez M.D.;  Location: SURGERY SAME DAY [facility] OR[facility];  Service:    • VAGINAL SUSPENSION  10/25/2016    Procedure: VAGINAL SUSPENSION SACROSPINOUS VAULT ;  Surgeon: Angel Hernadez M.D.;  Location: SURGERY SAME DAY [facility] OR[facility];  Service:    • OTHER      Tubal ligation     Family History   Problem Relation Age of Onset   • Diabetes Mother         pills   • Hypertension Mother    • Diabetes Father         insulin   • Diabetes Paternal Grandmother    • Diabetes Paternal Aunt      Social History     Social History   • Marital status: Single     Spouse name: N/A   • Number of children: N/A   • Years of education: N/A     Occupational History   • Not on file.     Social History Main Topics   • Smoking status: Never Smoker   • Smokeless tobacco: Never Used   • Alcohol use No   • Drug use: No   • Sexual activity: Yes     Partners: Male     Other Topics Concern   • Not on file     Social History Narrative   • No narrative on file     Allergies   Allergen Reactions   • Latex      Condons, rash   • Metformin Rash     Rash       Outpatient Encounter Prescriptions as of 11/12/2018   Medication Sig Dispense Refill   • amLODIPine (NORVASC)  "5 MG Tab Take 1 Tab by mouth every day. 90 Tab 3   • sertraline (ZOLOFT) 50 MG Tab Take 1 Tab by mouth every day. 30 Tab 11   • pioglitazone (ACTOS) 30 MG Tab Take 30 mg by mouth every day.     • losartan (COZAAR) 100 MG Tab Take 100 mg by mouth every day.     • Insulin Glargine (TOUJEO MAX SOLOSTAR) 300 UNIT/ML Solution Pen-injector Inject 15 Units as instructed every day.     • aspirin EC (ECOTRIN) 81 MG Tablet Delayed Response Take 81 mg by mouth every morning.     • atorvastatin (LIPITOR) 20 MG Tab TK 1 T PO ONCE AT NIGHT FOR CHOLESTEROL  3   • [DISCONTINUED] amLODIPine (NORVASC) 5 MG Tab Take 1 Tab by mouth every day. 30 Tab 3   • [DISCONTINUED] famotidine (PEPCID) 20 MG Tab Take 1 Tab by mouth 2 times a day. (Patient not taking: Reported on 11/12/2018) 20 Tab 0     No facility-administered encounter medications on file as of 11/12/2018.      Review of Systems   Constitutional: Negative for chills and fever.   HENT: Negative for congestion.    Respiratory: Negative for cough and shortness of breath.    Cardiovascular: Negative for chest pain, palpitations, orthopnea, leg swelling and PND.   Gastrointestinal: Negative for abdominal pain and nausea.   Musculoskeletal: Negative for myalgias.   Skin: Negative for rash.   Neurological: Negative for dizziness, loss of consciousness and headaches.   Endo/Heme/Allergies: Does not bruise/bleed easily.        Objective:   /88 (BP Location: Right arm, Patient Position: Sitting, BP Cuff Size: Adult)   Pulse 68   Ht 1.626 m (5' 4\")   Wt 104.3 kg (230 lb)   LMP 10/13/2016 (Exact Date)   SpO2 96%   BMI 39.48 kg/m²      Physical Exam   Constitutional: She is oriented to person, place, and time. She appears well-developed and well-nourished.   HENT:   Head: Normocephalic.   Eyes: EOM are normal.   Neck: Normal range of motion. Neck supple. No JVD present.   Cardiovascular: Normal rate, regular rhythm and normal heart sounds.    Pulmonary/Chest: Effort normal and " breath sounds normal. No respiratory distress. She has no wheezes. She has no rales.   Abdominal: Soft. Bowel sounds are normal. She exhibits no distension. There is no tenderness.   Musculoskeletal: Normal range of motion. She exhibits no edema.   Neurological: She is alert and oriented to person, place, and time.   Skin: Skin is warm and dry. No rash noted.   Psychiatric: She has a normal mood and affect.     CONCLUSIONS OF ECHOCARDIOGRAM OF 11/20/2017:  No prior study is available for comparison.   Left ventricular ejection fraction is visually estimated to be 65%.  Normal inferior vena cava size and inspiratory collapse.  Normal transthoracic echocardiogram.     Lab Results   Component Value Date/Time    WBC 5.5 09/24/2018 01:09 PM    RBC 4.84 09/24/2018 01:09 PM    HEMOGLOBIN 13.8 09/24/2018 01:09 PM    HEMATOCRIT 41.8 09/24/2018 01:09 PM    MCV 86.4 09/24/2018 01:09 PM    MCH 28.5 09/24/2018 01:09 PM    MCHC 33.0 (L) 09/24/2018 01:09 PM    MPV 11.4 09/24/2018 01:09 PM      Lab Results   Component Value Date/Time    SODIUM 139 09/24/2018 01:09 PM    POTASSIUM 3.8 09/24/2018 01:09 PM    CHLORIDE 104 09/24/2018 01:09 PM    CO2 27 09/24/2018 01:09 PM    GLUCOSE 170 (H) 09/24/2018 01:09 PM    BUN 16 09/24/2018 01:09 PM    CREATININE 0.73 09/24/2018 01:09 PM    CREATININE 0.6 05/27/2007 12:01 AM        Assessment:     1. Renovascular hypertension  amLODIPine (NORVASC) 5 MG Tab   2. Palpitations     3. Stable angina pectoris (HCC)     4. Type 2 diabetes mellitus without complication, with long-term current use of insulin (HCC)         Medical Decision Making:  Today's Assessment / Status / Plan:     1. Hypertension, treated and improved. Continue Amlodipine and Losartan.    2. Palpitations, resolved.    3. History of chest pain, with normal MPI (2015) and echocardiogram (2017). No recurrence.    4. Diabetes mellitus, treated.    Same medications. FU with us PRN; also establish care with PCP.    Collaborating MD:  Agustina      No Recipients

## 2019-01-05 ENCOUNTER — HOSPITAL ENCOUNTER (EMERGENCY)
Dept: HOSPITAL 8 - ED | Age: 44
Discharge: HOME | End: 2019-01-05
Payer: MEDICAID

## 2019-01-05 VITALS — BODY MASS INDEX: 40.91 KG/M2 | HEIGHT: 64 IN | WEIGHT: 239.64 LBS

## 2019-01-05 VITALS — SYSTOLIC BLOOD PRESSURE: 187 MMHG | DIASTOLIC BLOOD PRESSURE: 81 MMHG

## 2019-01-05 DIAGNOSIS — I10: ICD-10-CM

## 2019-01-05 DIAGNOSIS — E11.9: ICD-10-CM

## 2019-01-05 DIAGNOSIS — B34.9: Primary | ICD-10-CM

## 2019-01-05 DIAGNOSIS — K21.9: ICD-10-CM

## 2019-01-05 PROCEDURE — 99283 EMERGENCY DEPT VISIT LOW MDM: CPT

## 2019-01-05 PROCEDURE — 71046 X-RAY EXAM CHEST 2 VIEWS: CPT

## 2019-02-04 ENCOUNTER — OFFICE VISIT (OUTPATIENT)
Dept: CARDIOLOGY | Facility: MEDICAL CENTER | Age: 44
End: 2019-02-04
Payer: MEDICAID

## 2019-02-04 VITALS
HEIGHT: 64 IN | DIASTOLIC BLOOD PRESSURE: 98 MMHG | HEART RATE: 78 BPM | OXYGEN SATURATION: 94 % | SYSTOLIC BLOOD PRESSURE: 150 MMHG | BODY MASS INDEX: 40.97 KG/M2 | WEIGHT: 240 LBS

## 2019-02-04 DIAGNOSIS — E78.2 MIXED HYPERLIPIDEMIA: ICD-10-CM

## 2019-02-04 DIAGNOSIS — I10 HYPERTENSION, UNSPECIFIED TYPE: ICD-10-CM

## 2019-02-04 DIAGNOSIS — E11.9 TYPE 2 DIABETES MELLITUS WITHOUT COMPLICATION, WITH LONG-TERM CURRENT USE OF INSULIN (HCC): ICD-10-CM

## 2019-02-04 DIAGNOSIS — R07.89 OTHER CHEST PAIN: ICD-10-CM

## 2019-02-04 DIAGNOSIS — I10 ESSENTIAL HYPERTENSION, BENIGN: ICD-10-CM

## 2019-02-04 DIAGNOSIS — Z79.4 TYPE 2 DIABETES MELLITUS WITHOUT COMPLICATION, WITH LONG-TERM CURRENT USE OF INSULIN (HCC): ICD-10-CM

## 2019-02-04 DIAGNOSIS — I20.89 STABLE ANGINA PECTORIS: ICD-10-CM

## 2019-02-04 PROBLEM — E78.5 HYPERLIPIDEMIA: Status: ACTIVE | Noted: 2019-02-04

## 2019-02-04 LAB — EKG IMPRESSION: NORMAL

## 2019-02-04 PROCEDURE — 93000 ELECTROCARDIOGRAM COMPLETE: CPT | Performed by: INTERNAL MEDICINE

## 2019-02-04 PROCEDURE — 99214 OFFICE O/P EST MOD 30 MIN: CPT | Performed by: NURSE PRACTITIONER

## 2019-02-04 RX ORDER — INSULIN GLARGINE 100 [IU]/ML
35 INJECTION, SOLUTION SUBCUTANEOUS EVERY EVENING
COMMUNITY
Start: 2018-11-28

## 2019-02-04 RX ORDER — LANCETS 33 GAUGE
EACH MISCELLANEOUS
Refills: 5 | COMMUNITY
Start: 2019-01-28 | End: 2022-09-20

## 2019-02-04 RX ORDER — BENZONATATE 200 MG/1
CAPSULE ORAL
Refills: 0 | COMMUNITY
Start: 2018-12-26 | End: 2019-02-04

## 2019-02-04 RX ORDER — AMLODIPINE BESYLATE 5 MG/1
TABLET ORAL
Refills: 1 | COMMUNITY
Start: 2019-01-10 | End: 2019-02-04

## 2019-02-04 RX ORDER — BENZONATATE 100 MG/1
CAPSULE ORAL
Refills: 0 | COMMUNITY
Start: 2019-01-05 | End: 2019-02-04

## 2019-02-04 RX ORDER — AMLODIPINE BESYLATE 5 MG/1
TABLET ORAL
COMMUNITY
Start: 2019-01-09 | End: 2019-02-04

## 2019-02-04 RX ORDER — FLUTICASONE FUROATE 100 UG/1
POWDER RESPIRATORY (INHALATION)
Refills: 0 | COMMUNITY
Start: 2019-01-25 | End: 2019-02-19

## 2019-02-04 RX ORDER — IBUPROFEN 600 MG/1
TABLET ORAL
Refills: 0 | COMMUNITY
Start: 2018-12-28 | End: 2020-04-22

## 2019-02-04 RX ORDER — AMLODIPINE BESYLATE 10 MG/1
10 TABLET ORAL DAILY
Qty: 30 TAB | Refills: 6 | Status: SHIPPED | OUTPATIENT
Start: 2019-02-04 | End: 2019-02-19 | Stop reason: SDUPTHER

## 2019-02-04 RX ORDER — DOXYCYCLINE 100 MG/1
CAPSULE ORAL
Refills: 0 | COMMUNITY
Start: 2018-12-31 | End: 2019-02-04

## 2019-02-04 RX ORDER — ALBUTEROL SULFATE 90 UG/1
AEROSOL, METERED RESPIRATORY (INHALATION)
Refills: 0 | COMMUNITY
Start: 2019-01-05 | End: 2019-02-19

## 2019-02-04 RX ORDER — GLIMEPIRIDE 2 MG/1
TABLET ORAL
Refills: 3 | COMMUNITY
Start: 2019-01-10 | End: 2020-04-22

## 2019-02-04 RX ORDER — ATORVASTATIN CALCIUM 40 MG/1
TABLET, FILM COATED ORAL
COMMUNITY
Start: 2019-02-03 | End: 2019-08-05

## 2019-02-04 RX ORDER — AZITHROMYCIN 250 MG/1
TABLET, FILM COATED ORAL
Refills: 0 | COMMUNITY
Start: 2018-12-26 | End: 2019-02-04

## 2019-02-04 RX ORDER — NIFEDIPINE 30 MG
TABLET, EXTENDED RELEASE ORAL
Refills: 2 | COMMUNITY
Start: 2019-01-10 | End: 2019-08-05

## 2019-02-04 RX ORDER — LOSARTAN POTASSIUM AND HYDROCHLOROTHIAZIDE 25; 100 MG/1; MG/1
TABLET ORAL
Refills: 1 | COMMUNITY
Start: 2019-01-15 | End: 2019-02-04

## 2019-02-04 ASSESSMENT — ENCOUNTER SYMPTOMS
DIZZINESS: 0
NAUSEA: 0
BRUISES/BLEEDS EASILY: 0
SHORTNESS OF BREATH: 1
LOSS OF CONSCIOUSNESS: 0
COUGH: 0
INSOMNIA: 0
PALPITATIONS: 0
MYALGIAS: 0
HEADACHES: 0
PND: 0
ABDOMINAL PAIN: 0
ORTHOPNEA: 0
FEVER: 0
CHILLS: 0

## 2019-02-04 NOTE — LETTER
[facility]   [address]   [address]  [place] [address]  Phone: [phone]  Fax: [phone]              Tanya Barrow  1975    Encounter Date: 2/4/2019    OTF Salas          PROGRESS NOTE:  No notes on file      No Recipients

## 2019-02-12 ENCOUNTER — TELEPHONE (OUTPATIENT)
Dept: CARDIOLOGY | Facility: MEDICAL CENTER | Age: 44
End: 2019-02-12

## 2019-02-15 ENCOUNTER — HOSPITAL ENCOUNTER (OUTPATIENT)
Dept: RADIOLOGY | Facility: MEDICAL CENTER | Age: 44
End: 2019-02-15
Attending: NURSE PRACTITIONER
Payer: MEDICAID

## 2019-02-15 DIAGNOSIS — I20.89 STABLE ANGINA PECTORIS: ICD-10-CM

## 2019-02-15 DIAGNOSIS — E78.2 MIXED HYPERLIPIDEMIA: ICD-10-CM

## 2019-02-15 DIAGNOSIS — I10 ESSENTIAL HYPERTENSION, BENIGN: ICD-10-CM

## 2019-02-15 DIAGNOSIS — I10 HYPERTENSION, UNSPECIFIED TYPE: ICD-10-CM

## 2019-02-15 DIAGNOSIS — R07.89 OTHER CHEST PAIN: ICD-10-CM

## 2019-02-15 PROCEDURE — 93018 CV STRESS TEST I&R ONLY: CPT | Performed by: INTERNAL MEDICINE

## 2019-02-15 PROCEDURE — 700111 HCHG RX REV CODE 636 W/ 250 OVERRIDE (IP)

## 2019-02-15 PROCEDURE — 78452 HT MUSCLE IMAGE SPECT MULT: CPT | Mod: 26 | Performed by: INTERNAL MEDICINE

## 2019-02-15 PROCEDURE — A9502 TC99M TETROFOSMIN: HCPCS

## 2019-02-15 RX ORDER — REGADENOSON 0.08 MG/ML
INJECTION, SOLUTION INTRAVENOUS
Status: COMPLETED
Start: 2019-02-15 | End: 2019-02-15

## 2019-02-15 RX ADMIN — REGADENOSON 0.4 MG: 0.08 INJECTION, SOLUTION INTRAVENOUS at 15:18

## 2019-02-19 ENCOUNTER — OFFICE VISIT (OUTPATIENT)
Dept: CARDIOLOGY | Facility: MEDICAL CENTER | Age: 44
End: 2019-02-19
Payer: MEDICAID

## 2019-02-19 VITALS
SYSTOLIC BLOOD PRESSURE: 140 MMHG | HEIGHT: 64 IN | OXYGEN SATURATION: 95 % | BODY MASS INDEX: 40.97 KG/M2 | HEART RATE: 88 BPM | WEIGHT: 240 LBS | DIASTOLIC BLOOD PRESSURE: 80 MMHG

## 2019-02-19 DIAGNOSIS — I20.89 STABLE ANGINA PECTORIS: ICD-10-CM

## 2019-02-19 DIAGNOSIS — E11.9 TYPE 2 DIABETES MELLITUS WITHOUT COMPLICATION, WITH LONG-TERM CURRENT USE OF INSULIN (HCC): ICD-10-CM

## 2019-02-19 DIAGNOSIS — I10 HYPERTENSION, UNSPECIFIED TYPE: ICD-10-CM

## 2019-02-19 DIAGNOSIS — Z79.4 TYPE 2 DIABETES MELLITUS WITHOUT COMPLICATION, WITH LONG-TERM CURRENT USE OF INSULIN (HCC): ICD-10-CM

## 2019-02-19 DIAGNOSIS — I10 ESSENTIAL HYPERTENSION, BENIGN: ICD-10-CM

## 2019-02-19 DIAGNOSIS — E78.2 MIXED HYPERLIPIDEMIA: ICD-10-CM

## 2019-02-19 PROCEDURE — 99214 OFFICE O/P EST MOD 30 MIN: CPT | Performed by: NURSE PRACTITIONER

## 2019-02-19 RX ORDER — LOSARTAN POTASSIUM AND HYDROCHLOROTHIAZIDE 25; 100 MG/1; MG/1
TABLET ORAL
Refills: 0 | COMMUNITY
Start: 2019-02-14 | End: 2019-02-19

## 2019-02-19 RX ORDER — PIOGLITAZONEHYDROCHLORIDE 30 MG/1
30 TABLET ORAL DAILY
COMMUNITY
End: 2019-08-05

## 2019-02-19 RX ORDER — AMLODIPINE BESYLATE 10 MG/1
TABLET ORAL
COMMUNITY
Start: 2019-02-04 | End: 2019-02-19

## 2019-02-19 RX ORDER — AMLODIPINE BESYLATE 10 MG/1
10 TABLET ORAL DAILY
Qty: 90 TAB | Refills: 3 | Status: SHIPPED | OUTPATIENT
Start: 2019-02-19 | End: 2019-08-05

## 2019-02-19 RX ORDER — ATORVASTATIN CALCIUM 20 MG/1
TABLET, FILM COATED ORAL
Refills: 3 | COMMUNITY
Start: 2019-02-14 | End: 2019-02-19

## 2019-02-19 RX ORDER — AMLODIPINE BESYLATE 10 MG/1
TABLET ORAL
Refills: 6 | COMMUNITY
Start: 2019-02-04 | End: 2019-02-19

## 2019-02-19 RX ORDER — FUROSEMIDE 20 MG/1
TABLET ORAL
COMMUNITY
Start: 2019-02-14 | End: 2019-02-19

## 2019-02-19 ASSESSMENT — ENCOUNTER SYMPTOMS
CHILLS: 0
MYALGIAS: 0
ABDOMINAL PAIN: 0
ORTHOPNEA: 0
PND: 0
LOSS OF CONSCIOUSNESS: 0
COUGH: 0
PALPITATIONS: 0
NAUSEA: 0
INSOMNIA: 0
BRUISES/BLEEDS EASILY: 0
HEADACHES: 0
DIZZINESS: 0
FEVER: 0
SHORTNESS OF BREATH: 0

## 2019-02-19 NOTE — LETTER
2019        Tanya Chad Barrow ( 1975)  [address] [place] [id number]    To Whom It May Concern:    Tanya was seen in our office this morning.    If you have any questions or concerns, please don't hesitate to call.        Sincerely,        KAYLEE Salas.    Electronically Signed

## 2019-02-19 NOTE — LETTER
[facility]   [address]   [address]  [place] [address]  Phone: [phone]  Fax: [phone]              Tanya Barrow  1975    Encounter Date: 2/19/2019    OTF [facility]as          PROGRESS NOTE:  Chief Complaint   Patient presents with   • Chest Pain   • HTN (Controlled)   • Hyperlipidemia   • Diabetes Mellitus       Subjective:   Tanya Barrow is a 43 y.o. female who presents today for follow-up of chest pain and hypertension.    Tanya is a 43 year old female with history of hypertension, hyperlipidemia and diabetes (all treated). She had been seen in the ER in September 2018 for chest pain. Previous MPI and echocardiogram in 2015 were normal.    At the last visit a couple of weeks ago, she was still having chest pain, and MPI was ordered. Amlodipine was also increased to 10mg once daily to help with BP.    She is here today for follow-up. She is feeling better, and chest pain is gone. No palpitations. BP seems better too, running 130-135 systolic at home. No shortness of breath, orthopnea or PND; no dizziness or syncope; no edema. Glucose has been a little higher lately.    Past Medical History:   Diagnosis Date   • Anemia    • Blood clotting disorder (HCC)    • Chest pain 02/2019    MPI with no ischemia or infarct, LVEF 72%.   • Diabetes     Oral meds   • Heart burn    • Hemorrhagic disorder (HCC)    • Hypertension 2006    Medication   • Urinary bladder disorder    • Urinary incontinence      Past Surgical History:   Procedure Laterality Date   • ANTERIOR AND POSTERIOR REPAIR  2/22/2018    Procedure: ANTERIOR AND POSTERIOR REPAIR;  Surgeon: Angel Hernadez M.D.;  Location: SURGERY SAME DAY [facility];  Service: Gynecology   • ENTEROCELE REPAIR  2/22/2018    Procedure: ENTEROCELE REPAIR;  Surgeon: Angel Hernadez M.D.;  Location: SURGERY SAME DAY [facility];  Service: Gynecology   • VAGINAL  SUSPENSION N/A 2/22/2018    Procedure: VAGINAL SUSPENSION- SACROSPINOUS VAULT;  Surgeon: Angel Hernadez M.D.;  Location: SURGERY SAME DAY [facility];  Service: Gynecology   • BLADDER SLING FEMALE N/A 2/22/2018    Procedure: BLADDER SLING FEMALE- TENSION FREE VAGINAL TAPE;  Surgeon: Angel Hernadez M.D.;  Location: SURGERY SAME DAY [facility];  Service: Gynecology   • BLADDER SLING FEMALE  10/17/2017    Procedure: BLADDER SLING FEMALE - TENSION FREE TAPE PROCEDURE;  Surgeon: Angel Hernadez M.D.;  Location: SURGERY SAME DAY [facility];  Service: Gynecology   • CYSTOSCOPY N/A 10/17/2017    Procedure: CYSTOSCOPY;  Surgeon: Angel Hernadez M.D.;  Location: SURGERY SAME DAY [facility];  Service: Gynecology   • ANTERIOR AND POSTERIOR REPAIR  10/17/2017    Procedure: ANTERIOR AND POSTERIOR REPAIR W/UPHOLD MESH;  Surgeon: Angel Hernadez M.D.;  Location: SURGERY SAME DAY [facility];  Service: Gynecology   • ENTEROCELE REPAIR  10/17/2017    Procedure: ENTEROCELE REPAIR - PERINEOPLASTY;  Surgeon: Angel Hernadez M.D.;  Location: SURGERY SAME DAY [facility];  Service: Gynecology   • VAGINAL SUSPENSION  10/17/2017    Procedure: VAGINAL SUSPENSION - SACROSPINOUS VAULT;  Surgeon: Angel Hernadez M.D.;  Location: SURGERY SAME DAY [facility];  Service: Gynecology   • BLADDER SLING FEMALE  4/11/2017    Procedure: BLADDER SLING FEMALE-TOT;  Surgeon: Angel Hernadez M.D.;  Location: SURGERY SAME DAY [facility];  Service:    • ANTERIOR AND POSTERIOR REPAIR  4/11/2017    Procedure: ANTERIOR AND POSTERIOR REPAIR;  Surgeon: nAgel Hernadez M.D.;  Location: SURGERY SAME DAY [facility];  Service:    • ENTEROCELE REPAIR  4/11/2017    Procedure: ENTEROCELE REPAIR- PERINEOPLASTY;  Surgeon: Angel Hernadez M.D.;  Location: SURGERY SAME DAY [facility];  Service:    • VAGINAL SUSPENSION  4/11/2017    Procedure: VAGINAL SUSPENSION-SACROSPINOUS VAULT;  Surgeon: Angel Hernadez M.D.;  Location: SURGERY SAME DAY  [facility]S;  Service:    • BLADDER SLING FEMALE  10/25/2016    Procedure: BLADDER SLING FEMALE TOT;  Surgeon: Angel Hernadez M.D.;  Location: SURGERY SAME DAY [facility];  Service:    • VAGINAL HYSTERECTOMY SCOPE TOTAL  10/25/2016    Procedure: VAGINAL HYSTERECTOMY SCOPE TOTAL;  Surgeon: Angel Hernadez M.D.;  Location: SURGERY SAME DAY [facility];  Service:    • SALPINGECTOMY Bilateral 10/25/2016    Procedure: SALPINGECTOMY;  Surgeon: Angel Hernadez M.D.;  Location: SURGERY SAME DAY [facility];  Service:    • ANTERIOR AND POSTERIOR REPAIR  10/25/2016    Procedure: ANTERIOR AND POSTERIOR REPAIR;  Surgeon: Angel Hernadez M.D.;  Location: SURGERY SAME DAY [facility];  Service:    • ENTEROCELE REPAIR  10/25/2016    Procedure: ENTEROCELE REPAIR, PERINEOPLASTY;  Surgeon: Angel Hernadez M.D.;  Location: SURGERY SAME DAY [facility];  Service:    • VAGINAL SUSPENSION  10/25/2016    Procedure: VAGINAL SUSPENSION SACROSPINOUS VAULT ;  Surgeon: Angel Hernadez M.D.;  Location: SURGERY SAME DAY [facility];  Service:    • OTHER      Tubal ligation     Family History   Problem Relation Age of Onset   • Diabetes Mother         pills   • Hypertension Mother    • Diabetes Father         insulin   • Diabetes Paternal Grandmother    • Diabetes Paternal Aunt      Social History     Social History   • Marital status: Single     Spouse name: N/A   • Number of children: N/A   • Years of education: N/A     Occupational History   • Not on file.     Social History Main Topics   • Smoking status: Never Smoker   • Smokeless tobacco: Never Used   • Alcohol use No   • Drug use: No   • Sexual activity: Yes     Partners: Male     Other Topics Concern   • Not on file     Social History Narrative   • No narrative on file     Allergies   Allergen Reactions   • Latex      Condons, rash   • Metformin Rash     Rash       Outpatient Encounter Prescriptions as of 2/19/2019   Medication Sig Dispense Refill   • pioglitazone (ACTOS)  30 MG Tab Take 30 mg by mouth every day.     • atorvastatin (LIPITOR) 40 MG Tab      • glimepiride (AMARYL) 2 MG Tab TK 1 T PO QD FOR DIABETES  3   • ONE TOUCH ULTRA TEST strip TEST BLOOD SUGAR QAM  1   • ibuprofen (MOTRIN) 600 MG Tab TK 1 T PO TID  0   • Insulin Glargine (BASAGLAR KWIKPEN) 100 UNIT/ML Solution Pen-injector      • ONETOUCH DELICA LANCETS 33G Misc U UTD QAM  5   • NIFEdipine (ADALAT CC) 30 MG CR tablet TK 1 T PO QD FOR BLOOD PRESSURE  2   • [DISCONTINUED] amLODIPine (NORVASC) 10 MG Tab Take 1 Tab by mouth every day. 30 Tab 6   • losartan (COZAAR) 100 MG Tab Take 100 mg by mouth every day.     • aspirin EC (ECOTRIN) 81 MG Tablet Delayed Response Take 81 mg by mouth every morning.     • [DISCONTINUED] amLODIPine (NORVASC) 10 MG Tab      • [DISCONTINUED] amLODIPine (NORVASC) 10 MG Tab TK 1 T PO QD  6   • [DISCONTINUED] atorvastatin (LIPITOR) 20 MG Tab TK 1 T PO ONCE AT NIGHT FOR CHOLESTEROL  3   • [DISCONTINUED] furosemide (LASIX) 20 MG Tab      • [DISCONTINUED] losartan-hydrochlorothiazide (HYZAAR) 100-25 MG per tablet TK 1 T PO QD FOR HIGH BLOOD PRESSURE  0   • [DISCONTINUED] VENTOLIN  (90 Base) MCG/ACT Aero Soln inhalation aerosol INHALE 1 TO 2 PUFFS PO Q 4 TO 6 H PRF DIFFICULTY BREATHING  0   • [DISCONTINUED] ARNUITY ELLIPTA 100 MCG/ACT AEROSOL POWDER, BREATH ACTIVATED INHALE 2 PUFFS PO QD  0   • [DISCONTINUED] sertraline (ZOLOFT) 50 MG Tab Take 1 Tab by mouth every day. 30 Tab 11     No facility-administered encounter medications on file as of 2/19/2019.      Review of Systems   Constitutional: Negative for chills and fever.   HENT: Negative for congestion.    Respiratory: Negative for cough and shortness of breath.    Cardiovascular: Negative for chest pain, palpitations, orthopnea, leg swelling and PND.   Gastrointestinal: Negative for abdominal pain and nausea.   Musculoskeletal: Negative for myalgias.   Skin: Negative for rash.   Neurological: Negative for dizziness, loss of  "consciousness and headaches.   Endo/Heme/Allergies: Does not bruise/bleed easily.   Psychiatric/Behavioral: The patient does not have insomnia.         Objective:   /80 (BP Location: Left arm, Patient Position: Sitting)   Pulse 88   Ht 1.626 m (5' 4\")   Wt 108.9 kg (240 lb)   LMP 10/13/2016 (Exact Date)   SpO2 95%   BMI 41.20 kg/m²      Physical Exam   Constitutional: She is oriented to person, place, and time. She appears well-developed and well-nourished.   She is obese (BMI 41.20)   HENT:   Head: Normocephalic.   Eyes: EOM are normal.   Neck: Normal range of motion. Neck supple. No JVD present.   Cardiovascular: Normal rate, regular rhythm and normal heart sounds.    Pulmonary/Chest: Effort normal and breath sounds normal. No respiratory distress. She has no wheezes. She has no rales.   Abdominal: Soft. Bowel sounds are normal. She exhibits no distension. There is no tenderness.   Musculoskeletal: Normal range of motion. She exhibits no edema.   Neurological: She is alert and oriented to person, place, and time.   Skin: Skin is warm and dry. No rash noted.   Psychiatric: She has a normal mood and affect.     NUCLEAR IMAGING INTERPRETATION OF 2/15/2019:   No evidence of significant jeopardized viable myocardium or prior myocardial    infarction.   Normal left ventricular size, ejection fraction, and wall motion.   ECG INTERPRETATION   Negative stress ECG for ischemia.    Assessment:     1. Stable angina pectoris (HCC)     2. Essential hypertension, benign     3. Mixed hyperlipidemia     4. Type 2 diabetes mellitus without complication, with long-term current use of insulin ([facility])         Medical Decision Making:  Today's Assessment / Status / Plan:     1. Episode of chest pain/pressure, now resolved with normal MPI study. Discussed results with patient, and she is reassured.    2. Hypertension, treated with combination therapy. BP is better today. To monitor BP at home, and if it starts going up again, " may need to titrate medication again.    3. Hyperlipidemia, treated.    4. Diabetes mellitus, treated and followed by PCP.    She is reassured regarding results of MPI. Same medications for now. FU in 1 year, sooner if clinical condition changes.    Collaborating MD: Ralph Clement

## 2019-02-19 NOTE — PROGRESS NOTES
Chief Complaint   Patient presents with   • Chest Pain   • HTN (Controlled)   • Hyperlipidemia   • Diabetes Mellitus       Subjective:   Tanya Johnsonrez is a 43 y.o. female who presents today for follow-up of chest pain and hypertension.    Tanya is a 43 year old female with history of hypertension, hyperlipidemia and diabetes (all treated). She had been seen in the ER in September 2018 for chest pain. Previous MPI and echocardiogram in 2015 were normal.    At the last visit a couple of weeks ago, she was still having chest pain, and MPI was ordered. Amlodipine was also increased to 10mg once daily to help with BP.    She is here today for follow-up. She is feeling better, and chest pain is gone. No palpitations. BP seems better too, running 130-135 systolic at home. No shortness of breath, orthopnea or PND; no dizziness or syncope; no edema. Glucose has been a little higher lately.    Past Medical History:   Diagnosis Date   • Anemia    • Blood clotting disorder (HCC)    • Chest pain 02/2019    MPI with no ischemia or infarct, LVEF 72%.   • Diabetes     Oral meds   • Heart burn    • Hemorrhagic disorder (HCC)    • Hypertension 2006    Medication   • Urinary bladder disorder    • Urinary incontinence      Past Surgical History:   Procedure Laterality Date   • ANTERIOR AND POSTERIOR REPAIR  2/22/2018    Procedure: ANTERIOR AND POSTERIOR REPAIR;  Surgeon: Angel Hernadez M.D.;  Location: SURGERY SAME DAY [facility];  Service: Gynecology   • ENTEROCELE REPAIR  2/22/2018    Procedure: ENTEROCELE REPAIR;  Surgeon: Angel Hernadez M.D.;  Location: SURGERY SAME DAY [facility];  Service: Gynecology   • VAGINAL SUSPENSION N/A 2/22/2018    Procedure: VAGINAL SUSPENSION- SACROSPINOUS VAULT;  Surgeon: Angel Hernadez M.D.;  Location: SURGERY SAME DAY [facility];  Service: Gynecology   • BLADDER SLING FEMALE N/A 2/22/2018    Procedure: BLADDER SLING FEMALE- TENSION FREE VAGINAL TAPE;  Surgeon: Angel REBOLLEDO  CHARI Hernadez;  Location: SURGERY SAME DAY [facility];  Service: Gynecology   • BLADDER SLING FEMALE  10/17/2017    Procedure: BLADDER SLING FEMALE - TENSION FREE TAPE PROCEDURE;  Surgeon: Angel Hernadez M.D.;  Location: SURGERY SAME DAY [facility];  Service: Gynecology   • CYSTOSCOPY N/A 10/17/2017    Procedure: CYSTOSCOPY;  Surgeon: Angel Hernadez M.D.;  Location: SURGERY SAME DAY [facility];  Service: Gynecology   • ANTERIOR AND POSTERIOR REPAIR  10/17/2017    Procedure: ANTERIOR AND POSTERIOR REPAIR W/UPHOLD MESH;  Surgeon: Angel Hernadez M.D.;  Location: SURGERY SAME DAY [facility];  Service: Gynecology   • ENTEROCELE REPAIR  10/17/2017    Procedure: ENTEROCELE REPAIR - PERINEOPLASTY;  Surgeon: Angel Hernadez M.D.;  Location: SURGERY SAME DAY [facility];  Service: Gynecology   • VAGINAL SUSPENSION  10/17/2017    Procedure: VAGINAL SUSPENSION - SACROSPINOUS VAULT;  Surgeon: Angel Hernadez M.D.;  Location: SURGERY SAME DAY [facility];  Service: Gynecology   • BLADDER SLING FEMALE  4/11/2017    Procedure: BLADDER SLING FEMALE-TOT;  Surgeon: Angel Hernadez M.D.;  Location: SURGERY SAME DAY [facility];  Service:    • ANTERIOR AND POSTERIOR REPAIR  4/11/2017    Procedure: ANTERIOR AND POSTERIOR REPAIR;  Surgeon: Angel Hernadez M.D.;  Location: SURGERY SAME DAY [facility];  Service:    • ENTEROCELE REPAIR  4/11/2017    Procedure: ENTEROCELE REPAIR- PERINEOPLASTY;  Surgeon: Angel Hernadez M.D.;  Location: SURGERY SAME DAY [facility];  Service:    • VAGINAL SUSPENSION  4/11/2017    Procedure: VAGINAL SUSPENSION-SACROSPINOUS VAULT;  Surgeon: Angel Hernadez M.D.;  Location: SURGERY SAME DAY [facility];  Service:    • BLADDER SLING FEMALE  10/25/2016    Procedure: BLADDER SLING FEMALE TOT;  Surgeon: Angel Hernadez M.D.;  Location: SURGERY SAME DAY [facility];  Service:    • VAGINAL HYSTERECTOMY SCOPE TOTAL  10/25/2016    Procedure: VAGINAL HYSTERECTOMY SCOPE TOTAL;  Surgeon: Angel  KESHA Hernadez M.D.;  Location: SURGERY SAME DAY [facility];  Service:    • SALPINGECTOMY Bilateral 10/25/2016    Procedure: SALPINGECTOMY;  Surgeon: Angel Hernadez M.D.;  Location: SURGERY SAME DAY [facility];  Service:    • ANTERIOR AND POSTERIOR REPAIR  10/25/2016    Procedure: ANTERIOR AND POSTERIOR REPAIR;  Surgeon: Angel Hernadez M.D.;  Location: SURGERY SAME DAY [facility];  Service:    • ENTEROCELE REPAIR  10/25/2016    Procedure: ENTEROCELE REPAIR, PERINEOPLASTY;  Surgeon: Angel Hernadez M.D.;  Location: SURGERY SAME DAY [facility];  Service:    • VAGINAL SUSPENSION  10/25/2016    Procedure: VAGINAL SUSPENSION SACROSPINOUS VAULT ;  Surgeon: Angel Hernadez M.D.;  Location: SURGERY SAME DAY [facility];  Service:    • OTHER      Tubal ligation     Family History   Problem Relation Age of Onset   • Diabetes Mother         pills   • Hypertension Mother    • Diabetes Father         insulin   • Diabetes Paternal Grandmother    • Diabetes Paternal Aunt      Social History     Social History   • Marital status: Single     Spouse name: N/A   • Number of children: N/A   • Years of education: N/A     Occupational History   • Not on file.     Social History Main Topics   • Smoking status: Never Smoker   • Smokeless tobacco: Never Used   • Alcohol use No   • Drug use: No   • Sexual activity: Yes     Partners: Male     Other Topics Concern   • Not on file     Social History Narrative   • No narrative on file     Allergies   Allergen Reactions   • Latex      Condons, rash   • Metformin Rash     Rash       Outpatient Encounter Prescriptions as of 2/19/2019   Medication Sig Dispense Refill   • pioglitazone (ACTOS) 30 MG Tab Take 30 mg by mouth every day.     • atorvastatin (LIPITOR) 40 MG Tab      • glimepiride (AMARYL) 2 MG Tab TK 1 T PO QD FOR DIABETES  3   • ONE TOUCH ULTRA TEST strip TEST BLOOD SUGAR QAM  1   • ibuprofen (MOTRIN) 600 MG Tab TK 1 T PO TID  0   • Insulin Glargine (BASAGLAR KWIKPEN) 100 UNIT/ML  "Solution Pen-injector      • ONETOUCH DELICA LANCETS 33G Misc U UTD QAM  5   • NIFEdipine (ADALAT CC) 30 MG CR tablet TK 1 T PO QD FOR BLOOD PRESSURE  2   • [DISCONTINUED] amLODIPine (NORVASC) 10 MG Tab Take 1 Tab by mouth every day. 30 Tab 6   • losartan (COZAAR) 100 MG Tab Take 100 mg by mouth every day.     • aspirin EC (ECOTRIN) 81 MG Tablet Delayed Response Take 81 mg by mouth every morning.     • [DISCONTINUED] amLODIPine (NORVASC) 10 MG Tab      • [DISCONTINUED] amLODIPine (NORVASC) 10 MG Tab TK 1 T PO QD  6   • [DISCONTINUED] atorvastatin (LIPITOR) 20 MG Tab TK 1 T PO ONCE AT NIGHT FOR CHOLESTEROL  3   • [DISCONTINUED] furosemide (LASIX) 20 MG Tab      • [DISCONTINUED] losartan-hydrochlorothiazide (HYZAAR) 100-25 MG per tablet TK 1 T PO QD FOR HIGH BLOOD PRESSURE  0   • [DISCONTINUED] VENTOLIN  (90 Base) MCG/ACT Aero Soln inhalation aerosol INHALE 1 TO 2 PUFFS PO Q 4 TO 6 H PRF DIFFICULTY BREATHING  0   • [DISCONTINUED] ARNUITY ELLIPTA 100 MCG/ACT AEROSOL POWDER, BREATH ACTIVATED INHALE 2 PUFFS PO QD  0   • [DISCONTINUED] sertraline (ZOLOFT) 50 MG Tab Take 1 Tab by mouth every day. 30 Tab 11     No facility-administered encounter medications on file as of 2/19/2019.      Review of Systems   Constitutional: Negative for chills and fever.   HENT: Negative for congestion.    Respiratory: Negative for cough and shortness of breath.    Cardiovascular: Negative for chest pain, palpitations, orthopnea, leg swelling and PND.   Gastrointestinal: Negative for abdominal pain and nausea.   Musculoskeletal: Negative for myalgias.   Skin: Negative for rash.   Neurological: Negative for dizziness, loss of consciousness and headaches.   Endo/Heme/Allergies: Does not bruise/bleed easily.   Psychiatric/Behavioral: The patient does not have insomnia.         Objective:   /80 (BP Location: Left arm, Patient Position: Sitting)   Pulse 88   Ht 1.626 m (5' 4\")   Wt 108.9 kg (240 lb)   LMP 10/13/2016 (Exact Date)  "  SpO2 95%   BMI 41.20 kg/m²     Physical Exam   Constitutional: She is oriented to person, place, and time. She appears well-developed and well-nourished.   She is obese (BMI 41.20)   HENT:   Head: Normocephalic.   Eyes: EOM are normal.   Neck: Normal range of motion. Neck supple. No JVD present.   Cardiovascular: Normal rate, regular rhythm and normal heart sounds.    Pulmonary/Chest: Effort normal and breath sounds normal. No respiratory distress. She has no wheezes. She has no rales.   Abdominal: Soft. Bowel sounds are normal. She exhibits no distension. There is no tenderness.   Musculoskeletal: Normal range of motion. She exhibits no edema.   Neurological: She is alert and oriented to person, place, and time.   Skin: Skin is warm and dry. No rash noted.   Psychiatric: She has a normal mood and affect.     NUCLEAR IMAGING INTERPRETATION OF 2/15/2019:   No evidence of significant jeopardized viable myocardium or prior myocardial    infarction.   Normal left ventricular size, ejection fraction, and wall motion.   ECG INTERPRETATION   Negative stress ECG for ischemia.    Assessment:     1. Stable angina pectoris (HCC)     2. Essential hypertension, benign     3. Mixed hyperlipidemia     4. Type 2 diabetes mellitus without complication, with long-term current use of insulin ([facility])         Medical Decision Making:  Today's Assessment / Status / Plan:     1. Episode of chest pain/pressure, now resolved with normal MPI study. Discussed results with patient, and she is reassured.    2. Hypertension, treated with combination therapy. BP is better today. To monitor BP at home, and if it starts going up again, may need to titrate medication again.    3. Hyperlipidemia, treated.    4. Diabetes mellitus, treated and followed by PCP.    She is reassured regarding results of MPI. Same medications for now. FU in 1 year, sooner if clinical condition changes.    Collaborating MD: Ralph

## 2019-08-01 ENCOUNTER — HOSPITAL ENCOUNTER (EMERGENCY)
Dept: HOSPITAL 8 - ED | Age: 44
Discharge: HOME | End: 2019-08-01
Payer: MEDICAID

## 2019-08-01 VITALS — SYSTOLIC BLOOD PRESSURE: 125 MMHG | DIASTOLIC BLOOD PRESSURE: 76 MMHG

## 2019-08-01 VITALS — HEIGHT: 64 IN | WEIGHT: 239.64 LBS | BODY MASS INDEX: 40.91 KG/M2

## 2019-08-01 DIAGNOSIS — R06.00: ICD-10-CM

## 2019-08-01 DIAGNOSIS — I10: ICD-10-CM

## 2019-08-01 DIAGNOSIS — E11.9: ICD-10-CM

## 2019-08-01 DIAGNOSIS — R00.2: Primary | ICD-10-CM

## 2019-08-01 LAB
ALBUMIN SERPL-MCNC: 3.6 G/DL (ref 3.4–5)
ALP SERPL-CCNC: 94 U/L (ref 45–117)
ALT SERPL-CCNC: 50 U/L (ref 12–78)
ANION GAP SERPL CALC-SCNC: 7 MMOL/L (ref 5–15)
BASOPHILS # BLD AUTO: 0.05 X10^3/UL (ref 0–0.1)
BASOPHILS NFR BLD AUTO: 1 % (ref 0–1)
BILIRUB SERPL-MCNC: 0.2 MG/DL (ref 0.2–1)
CALCIUM SERPL-MCNC: 9.1 MG/DL (ref 8.5–10.1)
CHLORIDE SERPL-SCNC: 105 MMOL/L (ref 98–107)
CREAT SERPL-MCNC: 0.78 MG/DL (ref 0.55–1.02)
EOSINOPHIL # BLD AUTO: 0.13 X10^3/UL (ref 0–0.4)
EOSINOPHIL NFR BLD AUTO: 2 % (ref 1–7)
ERYTHROCYTE [DISTWIDTH] IN BLOOD BY AUTOMATED COUNT: 13.7 % (ref 9.6–15.2)
LYMPHOCYTES # BLD AUTO: 2.82 X10^3/UL (ref 1–3.4)
LYMPHOCYTES NFR BLD AUTO: 35 % (ref 22–44)
MCH RBC QN AUTO: 28.4 PG (ref 27–34.8)
MCHC RBC AUTO-ENTMCNC: 32.8 G/DL (ref 32.4–35.8)
MCV RBC AUTO: 86.7 FL (ref 80–100)
MD: NO
MONOCYTES # BLD AUTO: 0.41 X10^3/UL (ref 0.2–0.8)
MONOCYTES NFR BLD AUTO: 5 % (ref 2–9)
NEUTROPHILS # BLD AUTO: 4.67 X10^3/UL (ref 1.8–6.8)
NEUTROPHILS NFR BLD AUTO: 58 % (ref 42–75)
PLATELET # BLD AUTO: 222 X10^3/UL (ref 130–400)
PMV BLD AUTO: 10.1 FL (ref 7.4–10.4)
PROT SERPL-MCNC: 7.8 G/DL (ref 6.4–8.2)
RBC # BLD AUTO: 5.02 X10^6/UL (ref 3.82–5.3)
TROPONIN I SERPL-MCNC: < 0.015 NG/ML (ref 0–0.04)

## 2019-08-01 PROCEDURE — 85025 COMPLETE CBC W/AUTO DIFF WBC: CPT

## 2019-08-01 PROCEDURE — 84484 ASSAY OF TROPONIN QUANT: CPT

## 2019-08-01 PROCEDURE — 71045 X-RAY EXAM CHEST 1 VIEW: CPT

## 2019-08-01 PROCEDURE — 99284 EMERGENCY DEPT VISIT MOD MDM: CPT

## 2019-08-01 PROCEDURE — 96372 THER/PROPH/DIAG INJ SC/IM: CPT

## 2019-08-01 PROCEDURE — 83880 ASSAY OF NATRIURETIC PEPTIDE: CPT

## 2019-08-01 PROCEDURE — 36415 COLL VENOUS BLD VENIPUNCTURE: CPT

## 2019-08-01 PROCEDURE — 80053 COMPREHEN METABOLIC PANEL: CPT

## 2019-08-01 PROCEDURE — 93005 ELECTROCARDIOGRAM TRACING: CPT

## 2019-08-05 ENCOUNTER — OFFICE VISIT (OUTPATIENT)
Dept: CARDIOLOGY | Facility: MEDICAL CENTER | Age: 44
End: 2019-08-05
Payer: COMMERCIAL

## 2019-08-05 VITALS
BODY MASS INDEX: 39.65 KG/M2 | DIASTOLIC BLOOD PRESSURE: 80 MMHG | OXYGEN SATURATION: 97 % | WEIGHT: 238 LBS | HEART RATE: 78 BPM | SYSTOLIC BLOOD PRESSURE: 128 MMHG | HEIGHT: 65 IN

## 2019-08-05 DIAGNOSIS — E78.2 MIXED HYPERLIPIDEMIA: ICD-10-CM

## 2019-08-05 DIAGNOSIS — R00.2 PALPITATIONS: ICD-10-CM

## 2019-08-05 DIAGNOSIS — E11.9 TYPE 2 DIABETES MELLITUS WITHOUT COMPLICATION, WITH LONG-TERM CURRENT USE OF INSULIN (HCC): ICD-10-CM

## 2019-08-05 DIAGNOSIS — I10 ESSENTIAL HYPERTENSION, BENIGN: ICD-10-CM

## 2019-08-05 DIAGNOSIS — Z79.4 TYPE 2 DIABETES MELLITUS WITHOUT COMPLICATION, WITH LONG-TERM CURRENT USE OF INSULIN (HCC): ICD-10-CM

## 2019-08-05 PROCEDURE — 99214 OFFICE O/P EST MOD 30 MIN: CPT | Performed by: NURSE PRACTITIONER

## 2019-08-05 RX ORDER — DIAZEPAM 5 MG/1
5 TABLET ORAL
Refills: 0 | COMMUNITY
Start: 2019-08-01 | End: 2020-04-22

## 2019-08-05 RX ORDER — KETOROLAC TROMETHAMINE 10 MG/1
10 TABLET, FILM COATED ORAL
Refills: 0 | COMMUNITY
Start: 2019-08-01 | End: 2020-04-22

## 2019-08-05 ASSESSMENT — ENCOUNTER SYMPTOMS
DIZZINESS: 0
COUGH: 0
SHORTNESS OF BREATH: 0
CHILLS: 0
ABDOMINAL PAIN: 0
LOSS OF CONSCIOUSNESS: 0
PALPITATIONS: 0
INSOMNIA: 0
PND: 0
HEADACHES: 0
FEVER: 0
NAUSEA: 0
BRUISES/BLEEDS EASILY: 0
ORTHOPNEA: 0
MYALGIAS: 0

## 2019-08-05 NOTE — PROGRESS NOTES
Chief Complaint   Patient presents with   • Hospital Follow-up   • Chest Pain   • Anxiety   • HTN (Controlled)   • Palpitations       Subjective:   Tanya Barrow is a 44 y.o. female who presents today for ER visit for chest pain.    Tanya is a 44 year old female with history of hypertension, hyperlipidemia and diabetes (all treated). Previous MPI in February 2019 was normal.    Last week, she had some sharp, left sided chest pain, and went to the ER at [place]. Testing was normal, and she was treated for costochondritis and anxiety (with Toradol and Valium).    She is here today for follow-up. No further chest pain, but she was just concerned about taking these medications. BP has been stable. No palpitations. No shortness of breath, orthopnea or PND; no dizziness or syncope; no edema. Glucose has been higher lately.    Past Medical History:   Diagnosis Date   • Anemia    • Blood clotting disorder (HCC)    • Chest pain 02/2019    MPI with no ischemia or infarct, LVEF 72%.   • Diabetes     Oral meds   • Heart burn    • Hemorrhagic disorder (HCC)    • Hypertension 2006    Medication   • Urinary bladder disorder    • Urinary incontinence      Past Surgical History:   Procedure Laterality Date   • ANTERIOR AND POSTERIOR REPAIR  2/22/2018    Procedure: ANTERIOR AND POSTERIOR REPAIR;  Surgeon: Angel Hernadez M.D.;  Location: SURGERY SAME DAY [facility];  Service: Gynecology   • ENTEROCELE REPAIR  2/22/2018    Procedure: ENTEROCELE REPAIR;  Surgeon: Angel Hernadez M.D.;  Location: SURGERY SAME DAY [facility];  Service: Gynecology   • VAGINAL SUSPENSION N/A 2/22/2018    Procedure: VAGINAL SUSPENSION- SACROSPINOUS VAULT;  Surgeon: Angel Hernadez M.D.;  Location: SURGERY SAME DAY [facility];  Service: Gynecology   • BLADDER SLING FEMALE N/A 2/22/2018    Procedure: BLADDER SLING FEMALE- TENSION FREE VAGINAL TAPE;  Surgeon: Angel Hernadez M.D.;  Location: SURGERY SAME DAY [facility];   Service: Gynecology   • BLADDER SLING FEMALE  10/17/2017    Procedure: BLADDER SLING FEMALE - TENSION FREE TAPE PROCEDURE;  Surgeon: Angel Hernadez M.D.;  Location: SURGERY SAME DAY [facility];  Service: Gynecology   • CYSTOSCOPY N/A 10/17/2017    Procedure: CYSTOSCOPY;  Surgeon: Angel Hernadez M.D.;  Location: SURGERY SAME DAY [facility];  Service: Gynecology   • ANTERIOR AND POSTERIOR REPAIR  10/17/2017    Procedure: ANTERIOR AND POSTERIOR REPAIR W/UPHOLD MESH;  Surgeon: Angel Hernadez M.D.;  Location: SURGERY SAME DAY [facility];  Service: Gynecology   • ENTEROCELE REPAIR  10/17/2017    Procedure: ENTEROCELE REPAIR - PERINEOPLASTY;  Surgeon: Angel Hernadez M.D.;  Location: SURGERY SAME DAY [facility];  Service: Gynecology   • VAGINAL SUSPENSION  10/17/2017    Procedure: VAGINAL SUSPENSION - SACROSPINOUS VAULT;  Surgeon: Angel Hernadez M.D.;  Location: SURGERY SAME DAY [facility];  Service: Gynecology   • BLADDER SLING FEMALE  4/11/2017    Procedure: BLADDER SLING FEMALE-TOT;  Surgeon: Angel Hernadez M.D.;  Location: SURGERY SAME DAY [facility];  Service:    • ANTERIOR AND POSTERIOR REPAIR  4/11/2017    Procedure: ANTERIOR AND POSTERIOR REPAIR;  Surgeon: Angel Hernadez M.D.;  Location: SURGERY SAME DAY [facility];  Service:    • ENTEROCELE REPAIR  4/11/2017    Procedure: ENTEROCELE REPAIR- PERINEOPLASTY;  Surgeon: Angel Hernadez M.D.;  Location: SURGERY SAME DAY [facility];  Service:    • VAGINAL SUSPENSION  4/11/2017    Procedure: VAGINAL SUSPENSION-SACROSPINOUS VAULT;  Surgeon: Angel Hernadez M.D.;  Location: SURGERY SAME DAY [facility];  Service:    • BLADDER SLING FEMALE  10/25/2016    Procedure: BLADDER SLING FEMALE TOT;  Surgeon: Angel Hernadez M.D.;  Location: SURGERY SAME DAY [facility];  Service:    • VAGINAL HYSTERECTOMY SCOPE TOTAL  10/25/2016    Procedure: VAGINAL HYSTERECTOMY SCOPE TOTAL;  Surgeon: Angel Hernadez M.D.;  Location: SURGERY SAME DAY [facility];   Service:    • SALPINGECTOMY Bilateral 10/25/2016    Procedure: SALPINGECTOMY;  Surgeon: Angel Hernadez M.D.;  Location: SURGERY SAME DAY [place];  Service:    • ANTERIOR AND POSTERIOR REPAIR  10/25/2016    Procedure: ANTERIOR AND POSTERIOR REPAIR;  Surgeon: Angel Hernadez M.D.;  Location: SURGERY SAME DAY [place];  Service:    • ENTEROCELE REPAIR  10/25/2016    Procedure: ENTEROCELE REPAIR, PERINEOPLASTY;  Surgeon: Angel Hernadez M.D.;  Location: SURGERY SAME DAY [facility];  Service:    • VAGINAL SUSPENSION  10/25/2016    Procedure: VAGINAL SUSPENSION SACROSPINOUS VAULT ;  Surgeon: Angel Hernadez M.D.;  Location: SURGERY SAME DAY [facility];  Service:    • OTHER      Tubal ligation     Family History   Problem Relation Age of Onset   • Diabetes Mother         pills   • Hypertension Mother    • Diabetes Father         insulin   • Diabetes Paternal Grandmother    • Diabetes Paternal Aunt      Social History     Socioeconomic History   • Marital status: Single     Spouse name: Not on file   • Number of children: Not on file   • Years of education: Not on file   • Highest education level: Not on file   Occupational History   • Not on file   Social Needs   • Financial resource strain: Not on file   • Food insecurity:     Worry: Not on file     Inability: Not on file   • Transportation needs:     Medical: Not on file     Non-medical: Not on file   Tobacco Use   • Smoking status: Never Smoker   • Smokeless tobacco: Never Used   Substance and Sexual Activity   • Alcohol use: No   • Drug use: No   • Sexual activity: Yes     Partners: Male   Lifestyle   • Physical activity:     Days per week: Not on file     Minutes per session: Not on file   • Stress: Not on file   Relationships   • Social connections:     Talks on phone: Not on file     Gets together: Not on file     Attends Church service: Not on file     Active member of club or organization: Not on file     Attends meetings of clubs or  organizations: Not on file     Relationship status: Not on file   • Intimate partner violence:     Fear of current or ex partner: Not on file     Emotionally abused: Not on file     Physically abused: Not on file     Forced sexual activity: Not on file   Other Topics Concern   • Not on file   Social History Narrative   • Not on file     Allergies   Allergen Reactions   • Latex      Condons, rash   • Metformin Rash     Rash       Outpatient Encounter Medications as of 8/5/2019   Medication Sig Dispense Refill   • glimepiride (AMARYL) 2 MG Tab TK 1 T PO QD FOR DIABETES  3   • ONE TOUCH ULTRA TEST strip TEST BLOOD SUGAR QAM  1   • ibuprofen (MOTRIN) 600 MG Tab TK 1 T PO TID  0   • Insulin Glargine (BASAGLAR KWIKPEN) 100 UNIT/ML Solution Pen-injector      • ONETOUCH DELICA LANCETS 33G Misc U UTD QAM  5   • losartan (COZAAR) 100 MG Tab Take 100 mg by mouth every day.     • aspirin EC (ECOTRIN) 81 MG Tablet Delayed Response Take 81 mg by mouth every morning.     • [DISCONTINUED] pioglitazone (ACTOS) 30 MG Tab Take 30 mg by mouth every day.     • [DISCONTINUED] amLODIPine (NORVASC) 10 MG Tab Take 1 Tab by mouth every day. (Patient not taking: Reported on 8/5/2019) 90 Tab 3   • [DISCONTINUED] atorvastatin (LIPITOR) 40 MG Tab      • [DISCONTINUED] NIFEdipine (ADALAT CC) 30 MG CR tablet TK 1 T PO QD FOR BLOOD PRESSURE  2     No facility-administered encounter medications on file as of 8/5/2019.      Review of Systems   Constitutional: Negative for chills and fever.   HENT: Negative for congestion.    Respiratory: Negative for cough and shortness of breath.    Cardiovascular: Negative for chest pain, palpitations, orthopnea, leg swelling and PND.   Gastrointestinal: Negative for abdominal pain and nausea.   Musculoskeletal: Negative for myalgias.   Skin: Negative for rash.   Neurological: Negative for dizziness, loss of consciousness and headaches.   Endo/Heme/Allergies: Does not bruise/bleed easily.   Psychiatric/Behavioral:  "The patient does not have insomnia.         Objective:   /80 (BP Location: Left arm, Patient Position: Sitting, BP Cuff Size: Adult)   Pulse 78   Ht 1.651 m (5' 5\")   Wt 108 kg (238 lb)   LMP 10/13/2016 (Exact Date)   SpO2 97%   BMI 39.61 kg/m²     Physical Exam   Constitutional: She is oriented to person, place, and time. She appears well-developed and well-nourished.   Weight is down (BMI 39.61)   HENT:   Head: Normocephalic.   Eyes: EOM are normal.   Neck: Normal range of motion. Neck supple. No JVD present.   Cardiovascular: Normal rate, regular rhythm and normal heart sounds.   Pulmonary/Chest: Effort normal and breath sounds normal. No respiratory distress. She has no wheezes. She has no rales.   Abdominal: Soft. Bowel sounds are normal. She exhibits no distension. There is no tenderness.   Musculoskeletal: Normal range of motion. She exhibits no edema.   Neurological: She is alert and oriented to person, place, and time.   Skin: Skin is warm and dry. No rash noted.   Psychiatric: She has a normal mood and affect.     Reviewed ER notes from [place].     NUCLEAR IMAGING INTERPRETATION OF MPI OF 2/15/2019:   No evidence of significant jeopardized viable myocardium or prior myocardial    infarction.   Normal left ventricular size, ejection fraction, and wall motion.   ECG INTERPRETATION   Negative stress ECG for ischemia.    Assessment:     1. Mixed hyperlipidemia  Comp Metabolic Panel    LIPID PANEL   2. Type 2 diabetes mellitus without complication, with long-term current use of insulin (HCC)  Comp Metabolic Panel    HEMOGLOBIN A1C   3. Essential hypertension, benign  CBC WITH DIFFERENTIAL   4. Palpitations  TSH       Medical Decision Making:  Today's Assessment / Status / Plan:     1. ER visit for chest pain, now resolved. Discuss Costochondritis and anxiety diagnosis. She can use toradol for pain, and Valium very sparingly for anxiety. Reassured regarding MPI of February 2019.    2. " Hyperlipidemia, to check fasting CMP and lipid panel.    3. Diabetes mellitus, treated. To check HgbA1c.    4. Hypertension, treated and stable. BP is good today.    5. Palpitation, possibly related to anxiety. To check TSH.    Reassured regarding recent diagnosis. Same medications, labs as above. FU with me in February 2019 as already scheduled.    Collaborating MD: Shirley

## 2019-08-13 ENCOUNTER — TELEPHONE (OUTPATIENT)
Dept: CARDIOLOGY | Facility: MEDICAL CENTER | Age: 44
End: 2019-08-13

## 2019-08-13 DIAGNOSIS — E11.9 TYPE 2 DIABETES MELLITUS WITHOUT COMPLICATION, WITH LONG-TERM CURRENT USE OF INSULIN (HCC): ICD-10-CM

## 2019-08-13 DIAGNOSIS — E78.2 MIXED HYPERLIPIDEMIA: ICD-10-CM

## 2019-08-13 DIAGNOSIS — Z79.4 TYPE 2 DIABETES MELLITUS WITHOUT COMPLICATION, WITH LONG-TERM CURRENT USE OF INSULIN (HCC): ICD-10-CM

## 2019-08-13 RX ORDER — ATORVASTATIN CALCIUM 20 MG/1
20 TABLET, FILM COATED ORAL EVERY EVENING
Qty: 30 TAB | COMMUNITY
Start: 2019-08-13 | End: 2020-04-22

## 2019-08-13 NOTE — TELEPHONE ENCOUNTER
Result Notes for Lipid Profile   Notes recorded by OTF Salas on 8/13/2019 at 1:30 PM PDT  Being a diabetic, should be on a statin - suggest starting Lipitor 10mg QHS. Repeat CMP and lipid panel in 1 month.  Also, glucose is NOT well controlled (HgbA1c is 10.1). Needs FU with PCP for better management of glucose.  Ask if she wants referral to endocrinologist - if so, happy to do so.  Thanks, AB       Called pt, discussed labs per AB and her recommendations, pt reports that she is already taking Lipitor 20mg daily. She wishes to be referred to Endocrinologist.     Referral to Endocrinology initiated     CMP and Lipid profile ordered, lab slip mailed to pt    MAR updated.     SHEA SIEGEL

## 2019-09-03 DIAGNOSIS — E11.9 TYPE 2 DIABETES MELLITUS WITHOUT COMPLICATION, WITH LONG-TERM CURRENT USE OF INSULIN (HCC): ICD-10-CM

## 2019-09-03 DIAGNOSIS — E78.2 MIXED HYPERLIPIDEMIA: ICD-10-CM

## 2019-09-03 DIAGNOSIS — Z79.4 TYPE 2 DIABETES MELLITUS WITHOUT COMPLICATION, WITH LONG-TERM CURRENT USE OF INSULIN (HCC): ICD-10-CM

## 2020-02-10 ENCOUNTER — OFFICE VISIT (OUTPATIENT)
Dept: CARDIOLOGY | Facility: MEDICAL CENTER | Age: 45
End: 2020-02-10
Payer: COMMERCIAL

## 2020-02-10 VITALS
DIASTOLIC BLOOD PRESSURE: 78 MMHG | BODY MASS INDEX: 38.41 KG/M2 | SYSTOLIC BLOOD PRESSURE: 132 MMHG | HEART RATE: 96 BPM | WEIGHT: 225 LBS | HEIGHT: 64 IN | OXYGEN SATURATION: 96 %

## 2020-02-10 DIAGNOSIS — E78.2 MIXED HYPERLIPIDEMIA: ICD-10-CM

## 2020-02-10 DIAGNOSIS — E11.9 TYPE 2 DIABETES MELLITUS WITHOUT COMPLICATION, WITH LONG-TERM CURRENT USE OF INSULIN (HCC): ICD-10-CM

## 2020-02-10 DIAGNOSIS — I10 ESSENTIAL HYPERTENSION, BENIGN: ICD-10-CM

## 2020-02-10 DIAGNOSIS — Z79.4 TYPE 2 DIABETES MELLITUS WITHOUT COMPLICATION, WITH LONG-TERM CURRENT USE OF INSULIN (HCC): ICD-10-CM

## 2020-02-10 DIAGNOSIS — I20.89 STABLE ANGINA PECTORIS (HCC): ICD-10-CM

## 2020-02-10 DIAGNOSIS — R00.2 PALPITATIONS: ICD-10-CM

## 2020-02-10 PROCEDURE — 99214 OFFICE O/P EST MOD 30 MIN: CPT | Performed by: NURSE PRACTITIONER

## 2020-02-10 RX ORDER — METOPROLOL SUCCINATE 50 MG/1
50 TABLET, EXTENDED RELEASE ORAL
Qty: 30 TAB | Refills: 1 | Status: SHIPPED | OUTPATIENT
Start: 2020-02-10 | End: 2020-05-12 | Stop reason: SDUPTHER

## 2020-02-10 ASSESSMENT — ENCOUNTER SYMPTOMS
PALPITATIONS: 1
BRUISES/BLEEDS EASILY: 0
CHILLS: 0
HEADACHES: 0
FEVER: 0
DIZZINESS: 0
MYALGIAS: 0
ORTHOPNEA: 0
LOSS OF CONSCIOUSNESS: 0
PND: 0
NAUSEA: 0
SHORTNESS OF BREATH: 0
ABDOMINAL PAIN: 0
COUGH: 0
INSOMNIA: 0

## 2020-02-11 NOTE — PROGRESS NOTES
Chief Complaint   Patient presents with   • Follow-Up   • Chest Pain   • Palpitations   • HTN (Controlled)   • Hyperlipidemia   • Diabetes Mellitus       Subjective:   Tanya Barrow is a 44 y.o. female who presents today for six month follow-up of hypertension, hyperlipidemia, and diabetes.    Tanya is a 44 year old female with history of hypertension, hyperlipidemia and diabetes (all treated). She also has a history of chest pain, with previous MPI testing in 2015 and 2019 was normal.     She is here today for follow-up. No further chest pain, pressure or discomfort. She has not had to use any Valium. BP has been stable. She does occasionally have some palpitations, usually when she is under more stress. She did change jobs recently, and this has helped. No shortness of breath, orthopnea or PND; no dizziness or syncope; no edema. Glucose is slowly getting under better control.    Past Medical History:   Diagnosis Date   • Anemia    • Blood clotting disorder (HCC)    • Chest pain 02/2019    MPI with no ischemia or infarct, LVEF 72%.   • Diabetes     Oral meds   • Heart burn    • Hemorrhagic disorder (HCC)    • Hypertension 2006    Medication   • Urinary bladder disorder    • Urinary incontinence      Past Surgical History:   Procedure Laterality Date   • ANTERIOR AND POSTERIOR REPAIR  2/22/2018    Procedure: ANTERIOR AND POSTERIOR REPAIR;  Surgeon: Angel Hernadez M.D.;  Location: SURGERY SAME DAY [facility];  Service: Gynecology   • ENTEROCELE REPAIR  2/22/2018    Procedure: ENTEROCELE REPAIR;  Surgeon: Angel Hernadez M.D.;  Location: SURGERY SAME DAY [facility];  Service: Gynecology   • VAGINAL SUSPENSION N/A 2/22/2018    Procedure: VAGINAL SUSPENSION- SACROSPINOUS VAULT;  Surgeon: Angel Hernadez M.D.;  Location: SURGERY SAME DAY [facility];  Service: Gynecology   • BLADDER SLING FEMALE N/A 2/22/2018    Procedure: BLADDER SLING FEMALE- TENSION FREE VAGINAL TAPE;  Surgeon: Angel REBOLLEDO  CHARI Hernadez;  Location: SURGERY SAME DAY [facility];  Service: Gynecology   • BLADDER SLING FEMALE  10/17/2017    Procedure: BLADDER SLING FEMALE - TENSION FREE TAPE PROCEDURE;  Surgeon: Angel Hernadez M.D.;  Location: SURGERY SAME DAY [facility];  Service: Gynecology   • CYSTOSCOPY N/A 10/17/2017    Procedure: CYSTOSCOPY;  Surgeon: Angel Hernadez M.D.;  Location: SURGERY SAME DAY [facility];  Service: Gynecology   • ANTERIOR AND POSTERIOR REPAIR  10/17/2017    Procedure: ANTERIOR AND POSTERIOR REPAIR W/UPHOLD MESH;  Surgeon: Angel Hernadez M.D.;  Location: SURGERY SAME DAY [facility];  Service: Gynecology   • ENTEROCELE REPAIR  10/17/2017    Procedure: ENTEROCELE REPAIR - PERINEOPLASTY;  Surgeon: Angel Hernadez M.D.;  Location: SURGERY SAME DAY [facility];  Service: Gynecology   • VAGINAL SUSPENSION  10/17/2017    Procedure: VAGINAL SUSPENSION - SACROSPINOUS VAULT;  Surgeon: nAgel Hernadez M.D.;  Location: SURGERY SAME DAY [facility];  Service: Gynecology   • BLADDER SLING FEMALE  4/11/2017    Procedure: BLADDER SLING FEMALE-TOT;  Surgeon: Angel Hernadez M.D.;  Location: SURGERY SAME DAY [facility];  Service:    • ANTERIOR AND POSTERIOR REPAIR  4/11/2017    Procedure: ANTERIOR AND POSTERIOR REPAIR;  Surgeon: Angel Hernadez M.D.;  Location: SURGERY SAME DAY [facility];  Service:    • ENTEROCELE REPAIR  4/11/2017    Procedure: ENTEROCELE REPAIR- PERINEOPLASTY;  Surgeon: Angel Hernadez M.D.;  Location: SURGERY SAME DAY [facility];  Service:    • VAGINAL SUSPENSION  4/11/2017    Procedure: VAGINAL SUSPENSION-SACROSPINOUS VAULT;  Surgeon: Angel Hernadez M.D.;  Location: SURGERY SAME DAY [facility];  Service:    • BLADDER SLING FEMALE  10/25/2016    Procedure: BLADDER SLING FEMALE TOT;  Surgeon: Angel Hernadez M.D.;  Location: SURGERY SAME DAY [facility];  Service:    • VAGINAL HYSTERECTOMY SCOPE TOTAL  10/25/2016    Procedure: VAGINAL HYSTERECTOMY SCOPE TOTAL;  Surgeon: Angel  KESHA Hernadez M.D.;  Location: SURGERY SAME DAY [facility];  Service:    • SALPINGECTOMY Bilateral 10/25/2016    Procedure: SALPINGECTOMY;  Surgeon: Angel Hernadez M.D.;  Location: SURGERY SAME DAY [facility];  Service:    • ANTERIOR AND POSTERIOR REPAIR  10/25/2016    Procedure: ANTERIOR AND POSTERIOR REPAIR;  Surgeon: Angel Hernadez M.D.;  Location: SURGERY SAME DAY [facility];  Service:    • ENTEROCELE REPAIR  10/25/2016    Procedure: ENTEROCELE REPAIR, PERINEOPLASTY;  Surgeon: Angel Hernadez M.D.;  Location: SURGERY SAME DAY [facility];  Service:    • VAGINAL SUSPENSION  10/25/2016    Procedure: VAGINAL SUSPENSION SACROSPINOUS VAULT ;  Surgeon: Angel Hernadez M.D.;  Location: SURGERY SAME DAY [facility];  Service:    • OTHER      Tubal ligation     Family History   Problem Relation Age of Onset   • Diabetes Mother         pills   • Hypertension Mother    • Diabetes Father         insulin   • Diabetes Paternal Grandmother    • Diabetes Paternal Aunt      Social History     Socioeconomic History   • Marital status: Single     Spouse name: Not on file   • Number of children: Not on file   • Years of education: Not on file   • Highest education level: Not on file   Occupational History   • Not on file   Social Needs   • Financial resource strain: Not on file   • Food insecurity:     Worry: Not on file     Inability: Not on file   • Transportation needs:     Medical: Not on file     Non-medical: Not on file   Tobacco Use   • Smoking status: Never Smoker   • Smokeless tobacco: Never Used   Substance and Sexual Activity   • Alcohol use: No   • Drug use: No   • Sexual activity: Yes     Partners: Male   Lifestyle   • Physical activity:     Days per week: Not on file     Minutes per session: Not on file   • Stress: Not on file   Relationships   • Social connections:     Talks on phone: Not on file     Gets together: Not on file     Attends Nondenominational service: Not on file     Active member of club or  organization: Not on file     Attends meetings of clubs or organizations: Not on file     Relationship status: Not on file   • Intimate partner violence:     Fear of current or ex partner: Not on file     Emotionally abused: Not on file     Physically abused: Not on file     Forced sexual activity: Not on file   Other Topics Concern   • Not on file   Social History Narrative   • Not on file     Allergies   Allergen Reactions   • Latex      Condons, rash   • Metformin Rash     Rash       Outpatient Encounter Medications as of 2/10/2020   Medication Sig Dispense Refill   • metoprolol SR (TOPROL XL) 50 MG TABLET SR 24 HR Take 1 Tab by mouth 1 time daily as needed (palpitations). 30 Tab 1   • atorvastatin (LIPITOR) 20 MG Tab Take 1 Tab by mouth every evening. 30 Tab    • diazePAM (VALIUM) 5 MG Tab Take 5 mg by mouth 1 time daily as needed.  0   • ketorolac (TORADOL) 10 MG Tab Take 10 mg by mouth 1 time daily as needed.  0   • glimepiride (AMARYL) 2 MG Tab TK 1 T PO QD FOR DIABETES  3   • ONE TOUCH ULTRA TEST strip TEST BLOOD SUGAR QAM  1   • ibuprofen (MOTRIN) 600 MG Tab TK 1 T PO TID  0   • Insulin Glargine (BASAGLAR KWIKPEN) 100 UNIT/ML Solution Pen-injector      • ONETOUCH DELICA LANCETS 33G Misc U UTD QAM  5   • losartan (COZAAR) 100 MG Tab Take 100 mg by mouth every day.     • aspirin EC (ECOTRIN) 81 MG Tablet Delayed Response Take 81 mg by mouth every morning.       No facility-administered encounter medications on file as of 2/10/2020.      Review of Systems   Constitutional: Negative for chills and fever.   HENT: Negative for congestion.    Respiratory: Negative for cough and shortness of breath.    Cardiovascular: Positive for palpitations. Negative for chest pain, orthopnea, leg swelling and PND.   Gastrointestinal: Negative for abdominal pain and nausea.   Musculoskeletal: Negative for myalgias.   Skin: Negative for rash.   Neurological: Negative for dizziness, loss of consciousness and headaches.  "  Endo/Heme/Allergies: Does not bruise/bleed easily.   Psychiatric/Behavioral: The patient does not have insomnia.         Objective:   /78 (BP Location: Left arm, Patient Position: Sitting)   Pulse 96   Ht 1.626 m (5' 4\")   Wt 102.1 kg (225 lb)   LMP 10/13/2016 (Exact Date)   SpO2 96%   BMI 38.62 kg/m²     Physical Exam   Constitutional: She is oriented to person, place, and time. She appears well-developed and well-nourished.   Weight is down 15 pounds since last February 2019.   HENT:   Head: Normocephalic.   Eyes: EOM are normal.   Neck: Normal range of motion. Neck supple. No JVD present.   Cardiovascular: Normal rate, regular rhythm and normal heart sounds.   Pulmonary/Chest: Effort normal and breath sounds normal. No respiratory distress. She has no wheezes. She has no rales.   Abdominal: Soft. Bowel sounds are normal. She exhibits no distension. There is no tenderness.   Musculoskeletal: Normal range of motion.         General: No edema.   Neurological: She is alert and oriented to person, place, and time.   Skin: Skin is warm and dry. No rash noted.   Psychiatric: She has a normal mood and affect.     NUCLEAR IMAGING INTERPRETATION OF MPI OF 2/15/2019:   No evidence of significant jeopardized viable myocardium or prior myocardial    infarction.   Normal left ventricular size, ejection fraction, and wall motion.   ECG INTERPRETATION   Negative stress ECG for ischemia.     LABS AS OF 8/31/2019:  Cholesterol 137  Triglycerides 124  HDL 24  LDL 71  Glucose 193  BUN 9  Creatinine 0.76  GFR 94  Potassium 3.8  AST 29  ALT 32    Assessment:     1. Stable angina pectoris (HCC)     2. Palpitations     3. Essential hypertension, benign     4. Mixed hyperlipidemia     5. Type 2 diabetes mellitus without complication, with long-term current use of insulin (HCC)         Medical Decision Making:  Today's Assessment / Status / Plan:     1. History of chest pain, with normal MPI in 2015 and 2019, with no " recurrence.    2. Palpitations, rare and nonsustained. She can use Toprol XL 25-50mg PRN sustained palpitations. If they start occurring more frequently, consider ZioPatch of holter monitor.    3. Hypertension, treated with Losartan and stable.    4. Hyperlipidemia, treated with Lipitor. Recent lipid panel was good, except for low HDL.    5. Diabetes mellitus, treated, slowly improving. Keep working on diet and weight loss.    Same medications, including PRN Toprol for palpitations. FU with me in 6 months, sooner if clinical condition changes, and palpitations happen more frequently.    Collaborating MD: Donnie

## 2020-04-20 ENCOUNTER — HOSPITAL ENCOUNTER (EMERGENCY)
Facility: MEDICAL CENTER | Age: 45
End: 2020-04-21
Attending: EMERGENCY MEDICINE
Payer: COMMERCIAL

## 2020-04-20 ENCOUNTER — APPOINTMENT (OUTPATIENT)
Dept: RADIOLOGY | Facility: MEDICAL CENTER | Age: 45
End: 2020-04-20
Attending: EMERGENCY MEDICINE
Payer: COMMERCIAL

## 2020-04-20 DIAGNOSIS — R06.00 DYSPNEA, UNSPECIFIED TYPE: ICD-10-CM

## 2020-04-20 DIAGNOSIS — R07.9 CHEST PAIN, UNSPECIFIED TYPE: Primary | ICD-10-CM

## 2020-04-20 PROCEDURE — 36415 COLL VENOUS BLD VENIPUNCTURE: CPT

## 2020-04-20 PROCEDURE — 80053 COMPREHEN METABOLIC PANEL: CPT

## 2020-04-20 PROCEDURE — 85379 FIBRIN DEGRADATION QUANT: CPT

## 2020-04-20 PROCEDURE — 99284 EMERGENCY DEPT VISIT MOD MDM: CPT

## 2020-04-20 PROCEDURE — 93005 ELECTROCARDIOGRAM TRACING: CPT | Performed by: EMERGENCY MEDICINE

## 2020-04-20 PROCEDURE — 85025 COMPLETE CBC W/AUTO DIFF WBC: CPT

## 2020-04-20 PROCEDURE — 83880 ASSAY OF NATRIURETIC PEPTIDE: CPT

## 2020-04-20 PROCEDURE — 93005 ELECTROCARDIOGRAM TRACING: CPT

## 2020-04-20 PROCEDURE — 84484 ASSAY OF TROPONIN QUANT: CPT

## 2020-04-20 ASSESSMENT — PAIN DESCRIPTION - DESCRIPTORS: DESCRIPTORS: PRESSURE

## 2020-04-20 ASSESSMENT — FIBROSIS 4 INDEX: FIB4 SCORE: 0.74

## 2020-04-21 VITALS
HEIGHT: 60 IN | BODY MASS INDEX: 43.98 KG/M2 | HEART RATE: 61 BPM | OXYGEN SATURATION: 98 % | DIASTOLIC BLOOD PRESSURE: 98 MMHG | WEIGHT: 224 LBS | TEMPERATURE: 98.2 F | RESPIRATION RATE: 18 BRPM | SYSTOLIC BLOOD PRESSURE: 186 MMHG

## 2020-04-21 LAB
ALBUMIN SERPL BCP-MCNC: 4.3 G/DL (ref 3.2–4.9)
ALBUMIN/GLOB SERPL: 1.3 G/DL
ALP SERPL-CCNC: 94 U/L (ref 30–99)
ALT SERPL-CCNC: 33 U/L (ref 2–50)
ANION GAP SERPL CALC-SCNC: 17 MMOL/L (ref 7–16)
AST SERPL-CCNC: 20 U/L (ref 12–45)
BASOPHILS # BLD AUTO: 0.4 % (ref 0–1.8)
BASOPHILS # BLD: 0.04 K/UL (ref 0–0.12)
BILIRUB SERPL-MCNC: 0.3 MG/DL (ref 0.1–1.5)
BUN SERPL-MCNC: 11 MG/DL (ref 8–22)
CALCIUM SERPL-MCNC: 9.6 MG/DL (ref 8.5–10.5)
CHLORIDE SERPL-SCNC: 100 MMOL/L (ref 96–112)
CO2 SERPL-SCNC: 22 MMOL/L (ref 20–33)
CREAT SERPL-MCNC: 0.62 MG/DL (ref 0.5–1.4)
D DIMER PPP IA.FEU-MCNC: 0.37 UG/ML (FEU) (ref 0–0.5)
EKG IMPRESSION: NORMAL
EOSINOPHIL # BLD AUTO: 0.13 K/UL (ref 0–0.51)
EOSINOPHIL NFR BLD: 1.3 % (ref 0–6.9)
ERYTHROCYTE [DISTWIDTH] IN BLOOD BY AUTOMATED COUNT: 39.5 FL (ref 35.9–50)
GLOBULIN SER CALC-MCNC: 3.3 G/DL (ref 1.9–3.5)
GLUCOSE SERPL-MCNC: 179 MG/DL (ref 65–99)
HCT VFR BLD AUTO: 40.5 % (ref 37–47)
HGB BLD-MCNC: 13.7 G/DL (ref 12–16)
IMM GRANULOCYTES # BLD AUTO: 0.02 K/UL (ref 0–0.11)
IMM GRANULOCYTES NFR BLD AUTO: 0.2 % (ref 0–0.9)
LYMPHOCYTES # BLD AUTO: 4.31 K/UL (ref 1–4.8)
LYMPHOCYTES NFR BLD: 41.6 % (ref 22–41)
MCH RBC QN AUTO: 28.2 PG (ref 27–33)
MCHC RBC AUTO-ENTMCNC: 33.8 G/DL (ref 33.6–35)
MCV RBC AUTO: 83.3 FL (ref 81.4–97.8)
MONOCYTES # BLD AUTO: 0.41 K/UL (ref 0–0.85)
MONOCYTES NFR BLD AUTO: 4 % (ref 0–13.4)
NEUTROPHILS # BLD AUTO: 5.44 K/UL (ref 2–7.15)
NEUTROPHILS NFR BLD: 52.5 % (ref 44–72)
NRBC # BLD AUTO: 0 K/UL
NRBC BLD-RTO: 0 /100 WBC
NT-PROBNP SERPL IA-MCNC: 43 PG/ML (ref 0–125)
PLATELET # BLD AUTO: 252 K/UL (ref 164–446)
PMV BLD AUTO: 10.8 FL (ref 9–12.9)
POTASSIUM SERPL-SCNC: 3.4 MMOL/L (ref 3.6–5.5)
PROT SERPL-MCNC: 7.6 G/DL (ref 6–8.2)
RBC # BLD AUTO: 4.86 M/UL (ref 4.2–5.4)
SODIUM SERPL-SCNC: 139 MMOL/L (ref 135–145)
TROPONIN T SERPL-MCNC: <6 NG/L (ref 6–19)
WBC # BLD AUTO: 10.4 K/UL (ref 4.8–10.8)

## 2020-04-21 PROCEDURE — 700102 HCHG RX REV CODE 250 W/ 637 OVERRIDE(OP): Performed by: EMERGENCY MEDICINE

## 2020-04-21 PROCEDURE — 71046 X-RAY EXAM CHEST 2 VIEWS: CPT

## 2020-04-21 PROCEDURE — A9270 NON-COVERED ITEM OR SERVICE: HCPCS | Performed by: EMERGENCY MEDICINE

## 2020-04-21 RX ORDER — ACETAMINOPHEN 500 MG
1000 TABLET ORAL ONCE
Status: COMPLETED | OUTPATIENT
Start: 2020-04-21 | End: 2020-04-21

## 2020-04-21 RX ADMIN — ACETAMINOPHEN 1000 MG: 500 TABLET ORAL at 00:52

## 2020-04-21 ASSESSMENT — ENCOUNTER SYMPTOMS
NECK PAIN: 0
HEADACHES: 0
ORTHOPNEA: 0
PALPITATIONS: 0
CHILLS: 0
PND: 0
SORE THROAT: 0
BACK PAIN: 0
ABDOMINAL PAIN: 0
SHORTNESS OF BREATH: 1
DIZZINESS: 0
COUGH: 0
WHEEZING: 0
NAUSEA: 0
VOMITING: 0
FEVER: 0

## 2020-04-21 ASSESSMENT — HEART SCORE
AGE: 45-64
RISK FACTORS: >2 RISK FACTORS OR HX OF ATHEROSCLEROTIC DISEASE
TROPONIN: LESS THAN OR EQUAL TO NORMAL LIMIT
ECG: NORMAL
HEART SCORE: 3
HISTORY: SLIGHTLY SUSPICIOUS

## 2020-04-21 ASSESSMENT — LIFESTYLE VARIABLES: SUBSTANCE_ABUSE: 0

## 2020-04-21 NOTE — ED PROVIDER NOTES
"ED Provider Note     4/20/2020  11:48 PM    Means of Arrival: Walk In  History obtained by: patient  Limitations: none    CHIEF COMPLAINT  Chief Complaint   Patient presents with   • Shortness of Breath     x 1 week   • Chest Pain     x 1 week       HPI  Tanya Barrow is a 44 y.o. female with multiple medical problems including obesity, hypertension, diabetes who presents with concerns of shortness of breath for over 1 week.  Symptoms started last week.  She says that she feels like she cannot quite catch her breath.  She saw her primary provider and was prescribed an inhaler.  This has provided no relief.  Today she started having central chest pressure and discomfort.  No radiating symptoms.  Symptoms slightly worse with ambulation.  She has not noticed any leg swelling or leg pain.  There is mentioning of a \"blood clotting disorder\" in her chart but I cannot find much for detail on this.  She has had previous stress tests and cardiac evaluations which did not reveal significant findings.  Last stress test was February 2019.  She has had no sick contacts.  Denies cough.    There have been mentioning that she is anxious on previous visits.  When inquired about this she denied any feelings of nervousness or anxiousness.    REVIEW OF SYSTEMS  Review of Systems   Constitutional: Negative for chills and fever.   HENT: Negative for congestion and sore throat.    Respiratory: Positive for shortness of breath. Negative for cough and wheezing.    Cardiovascular: Positive for chest pain. Negative for palpitations, orthopnea, leg swelling and PND.   Gastrointestinal: Negative for abdominal pain, nausea and vomiting.   Genitourinary: Negative for dysuria.   Musculoskeletal: Negative for back pain and neck pain.   Neurological: Negative for dizziness and headaches.   Psychiatric/Behavioral: Negative for substance abuse.   All other systems reviewed and are negative.    See HPI for further details.    PAST " MEDICAL HISTORY   has a past medical history of Anemia, Blood clotting disorder (HCC), Chest pain (02/2019), Diabetes, Heart burn, Hemorrhagic disorder (HCC), Hypertension (2006), Urinary bladder disorder, and Urinary incontinence.    SOCIAL HISTORY  Social History     Tobacco Use   • Smoking status: Never Smoker   • Smokeless tobacco: Never Used   Substance and Sexual Activity   • Alcohol use: No   • Drug use: No   • Sexual activity: Yes     Partners: Male       SURGICAL HISTORY   has a past surgical history that includes other; bladder sling female (10/25/2016); bladder sling female (4/11/2017); bladder sling female (10/17/2017); cystoscopy (N/A, 10/17/2017); vaginal hysterectomy scope total (10/25/2016); salpingectomy (Bilateral, 10/25/2016); anterior and posterior repair (10/25/2016); enterocele repair (10/25/2016); vaginal suspension (10/25/2016); anterior and posterior repair (4/11/2017); enterocele repair (4/11/2017); vaginal suspension (4/11/2017); anterior and posterior repair (10/17/2017); enterocele repair (10/17/2017); vaginal suspension (10/17/2017); anterior and posterior repair (2/22/2018); enterocele repair (2/22/2018); vaginal suspension (N/A, 2/22/2018); and bladder sling female (N/A, 2/22/2018).    CURRENT MEDICATIONS  Home Medications    **Home medications have not yet been reviewed for this encounter**         ALLERGIES  Allergies   Allergen Reactions   • Latex      Condons, rash   • Metformin Rash     Rash         PHYSICAL EXAM  VITAL SIGNS: BP (!) 186/98   Pulse 61   Temp 36.8 °C (98.2 °F) (Oral)   Resp 14   Ht 1.524 m (5')   Wt 101.6 kg (224 lb)   LMP 10/13/2016 (Exact Date)   SpO2 99%   BMI 43.75 kg/m²     Pulse ox interpretation: I interpret this pulse ox as normal.  Constitutional: Alert in no apparent distress.  Pleasant.  HENT: No signs of trauma, Bilateral external ears normal, Nose normal.   Eyes: Pupils are equal, Conjunctiva normal, Non-icteric.   Neck: Normal range of  motion, No tenderness, Supple, No stridor.   Cardiovascular: Regular rate and rhythm, no murmurs. Symmetric distal pulses. No cyanosis of extremities. No peripheral edema of extremities.  Thorax & Lungs: Normal breath sounds, No respiratory distress, No wheezing, No chest tenderness.  Speaking in full sentences.  Intermittently increased respiratory rate.  Abdomen: Bowel sounds normal, Soft, No tenderness, No masses, No pulsatile masses. No peritoneal signs.  Skin: Warm, Dry, No erythema, No rash.   Back: No midline bony tenderness, No CVA tenderness.   Musculoskeletal: Good range of motion in all major joints. No tenderness to palpation or major deformities noted.   Neurologic: Alert , Normal motor function, Normal sensory function, No focal deficits noted.   Psychiatric: Affect normal, Judgment normal, Mood normal.   Physical Exam      DIAGNOSTIC STUDIES / PROCEDURES    EKG  Results for orders placed or performed during the hospital encounter of 20   EKG   Result Value Ref Range    Report       [facility] Emergency Dept.    Test Date:  2020  Pt Name:    ALIX CARMEN Department: ER  MRN:        [id number]                      Room:       [facility]  Gender:     Female                       Technician: [id number]  :        1975                   Requested By:ER TRIAGE PROTOCOL  Order #:    [id number]                    Reading MD: Fred Kennedy II, MD    Measurements  Intervals                                Axis  Rate:       70                           P:          24  MS:         152                          QRS:        15  QRSD:       97                           T:          20  QT:         399  QTc:        431    Interpretive Statements  Sinus rhythm  Rate 70  Normal intervals. No ST elevation or depression. Normal twaves.  Normal sinus rhythm EKG.  Compared to ECG 2019 08:19:25  No significant changes  Electronically Signed On 2020 0:00:33 PDT by Fred  TAMIA Kennedy MD           LABS  Pertinent Labs & Imaging studies reviewed. (See chart for details)    RADIOLOGY  Pertinent Labs & Imaging studies reviewed. (See chart for details)    COURSE & MEDICAL DECISION MAKING  Pertinent Labs & Imaging studies reviewed. (See chart for details)    11:48 PM This is an emergent evaluation of a  44 y.o. female with multiple medical problems including multiple cardiac risk factors presents with concerns of progressive shortness of breath for over 1 week.  Not improving with inhalers.  Now with chest discomfort.  Her EKG today is normal and unchanged from previous.  Differential diagnosis besides ACS also includes PE, anxiety, GERD, pneumonia, pulmonary edema but I believe that is less likely.  Work-up will include CBC, CMP, troponin, d-dimer, chest x-ray.    1:19 AM  Troponin is undetectable.  I feel that a single troponin is sufficient because her symptoms have been ongoing for at least 1 day.  Heart score is 3.  D-dimer negative.  Chest x-ray unremarkable.  I added a proBNP and this is normal.  CBC CMP are acceptable.  No severe anemia.  Slight increased anion gap 17 but this is nonspecific.  Bicarb is normal.  Unclear exact etiology of her symptoms.  I think that it is not unreasonable to suggest that symptoms are possibly anxiety driven, especially since this has been brought up on multiple other occasions by other providers.  I reviewed results with her and recommend she continue to follow-up with primary provider. She is established with cardiology clinic, goes to the [facility] clinic.  She plans to continue to follow-up there for her chest discomfort and shortness of breath symptoms.  On review of her chart this seems to be a recurrent problem and not necessarily acute.    PPE Note: I personally donned full PPE for all patient encounters during this visit, including being clean-shaven with an N95 respirator mask, gloves, and goggles. I also practiced hand hygiene and  maintained a cautious distance during interview.     The patient will return for worsening symptoms and is stable at the time of discharge. The patient verbalizes understanding. Guidance was provided on appropriate use of medications including driving under the influence, overdose, and side effects.       FINAL IMPRESSION    ICD-10-CM   1. Chest pain, unspecified type R07.9   2. Dyspnea, unspecified type R06.00            This dictation was created using voice recognition software. The accuracy of the dictation is limited to the abilities of the software. I expect there may be some errors of grammar and possibly content. The nursing notes were reviewed and certain aspects of this information were incorporated into this note.    Electronically signed by: Fred Kennedy II, M.D., 4/20/2020 11:48 PM

## 2020-04-21 NOTE — ED NOTES
Pt discharged; pt verbalized understanding of discharge instructions as well as signs and symptoms for which to return to the ED. Pt ambulated to the lobby with steady gait.

## 2020-04-21 NOTE — ED NOTES
Pt presents to the ED with complaint as listed in Triage; Pt denies any contact with sick people or any known fever.     PIV established, blood return noted, labs collected, flushed.

## 2020-04-21 NOTE — ED NOTES
Chief Complaint   Patient presents with   • Shortness of Breath     x 1 week   • Chest Pain     x 1 week     Pt to triage in wheelchair and anxious apearing, reports she was seen at [facility] clinic today and prescribed an inhaler for SOB. Pt stating her SOB and CP became worse after inhaler use. HX of diabetes, HTN and angina. Pt denies fever/cough. Pt educated on triage process and placed back in lobby, pt encourage to alert staff with changes in condition.      /90   Pulse 89   Temp 36.8 °C (98.3 °F) (Temporal)   Resp 19   Ht 1.524 m (5')   Wt 101.6 kg (224 lb)   LMP 10/13/2016 (Exact Date)   SpO2 100%   BMI 43.75 kg/m²

## 2020-04-22 ENCOUNTER — OFFICE VISIT (OUTPATIENT)
Dept: CARDIOLOGY | Facility: MEDICAL CENTER | Age: 45
End: 2020-04-22
Payer: COMMERCIAL

## 2020-04-22 VITALS
HEIGHT: 64 IN | BODY MASS INDEX: 38.41 KG/M2 | WEIGHT: 225 LBS | DIASTOLIC BLOOD PRESSURE: 82 MMHG | HEART RATE: 78 BPM | SYSTOLIC BLOOD PRESSURE: 136 MMHG | OXYGEN SATURATION: 96 %

## 2020-04-22 DIAGNOSIS — I10 ESSENTIAL HYPERTENSION, BENIGN: ICD-10-CM

## 2020-04-22 DIAGNOSIS — E11.9 TYPE 2 DIABETES MELLITUS WITHOUT COMPLICATION, WITH LONG-TERM CURRENT USE OF INSULIN (HCC): ICD-10-CM

## 2020-04-22 DIAGNOSIS — I20.89 STABLE ANGINA PECTORIS (HCC): ICD-10-CM

## 2020-04-22 DIAGNOSIS — Z79.4 TYPE 2 DIABETES MELLITUS WITHOUT COMPLICATION, WITH LONG-TERM CURRENT USE OF INSULIN (HCC): ICD-10-CM

## 2020-04-22 DIAGNOSIS — E78.2 MIXED HYPERLIPIDEMIA: ICD-10-CM

## 2020-04-22 DIAGNOSIS — R00.2 PALPITATIONS: ICD-10-CM

## 2020-04-22 PROCEDURE — 99214 OFFICE O/P EST MOD 30 MIN: CPT | Performed by: NURSE PRACTITIONER

## 2020-04-22 RX ORDER — RANITIDINE 150 MG/1
150 TABLET ORAL 2 TIMES DAILY
COMMUNITY
End: 2021-02-16

## 2020-04-22 RX ORDER — ALBUTEROL SULFATE 90 UG/1
2 AEROSOL, METERED RESPIRATORY (INHALATION) EVERY 6 HOURS PRN
COMMUNITY
End: 2020-11-11

## 2020-04-22 RX ORDER — FLUTICASONE PROPIONATE 50 MCG
1 SPRAY, SUSPENSION (ML) NASAL DAILY
COMMUNITY
End: 2022-09-20

## 2020-04-22 RX ORDER — ATORVASTATIN CALCIUM 40 MG/1
40 TABLET, FILM COATED ORAL NIGHTLY
COMMUNITY
End: 2020-11-11

## 2020-04-22 RX ORDER — LINAGLIPTIN 5 MG/1
5 TABLET, FILM COATED ORAL DAILY
COMMUNITY
End: 2020-08-11

## 2020-04-22 RX ORDER — LISINOPRIL 10 MG/1
10 TABLET ORAL DAILY
COMMUNITY
End: 2020-08-11

## 2020-04-22 RX ORDER — NIFEDIPINE 30 MG
30 TABLET, EXTENDED RELEASE ORAL DAILY
COMMUNITY
End: 2020-11-11

## 2020-04-22 ASSESSMENT — ENCOUNTER SYMPTOMS
MYALGIAS: 0
LOSS OF CONSCIOUSNESS: 0
PALPITATIONS: 0
DIZZINESS: 0
INSOMNIA: 0
FEVER: 0
HEADACHES: 0
COUGH: 0
ORTHOPNEA: 0
NAUSEA: 0
SHORTNESS OF BREATH: 0
CHILLS: 0
BRUISES/BLEEDS EASILY: 0
ABDOMINAL PAIN: 0
PND: 0

## 2020-04-22 ASSESSMENT — FIBROSIS 4 INDEX: FIB4 SCORE: 0.64

## 2020-04-22 NOTE — PROGRESS NOTES
Chief Complaint   Patient presents with   • Hospital Follow-up   • Chest Pain   • Shortness of Breath   • HTN (Controlled)   • Hyperlipidemia   • Diabetes Mellitus       Subjective:   Tanya Barrow is a 44 y.o. female who presents today for ER visit for chest pain, and shortness of breath.    Tanya is a 44 year old female with history of hypertension, hyperlipidemia and diabetes (all treated). She also has a history of chest pain, with previous MPI testing in 2015 and 2019 was normal.    Recently, she was given a Flovent inhaler for shortness of breath, but developed some chest pain and palpitations. She presented to [facility] ER on 4/20/2020, and workup including EKG, troponin, D-dimer, CXR and labs were all normal. She was reassured, and is here today for follow-up.     No further chest pain, pressure or discomfort. No more palpitations.  BP has been stable at home. No shortness of breath, orthopnea or PND; no dizziness or syncope; no edema. Glucose is slowly improving. She is not exercising much; she is sleeping well. She states she is managing her stress alright.    Past Medical History:   Diagnosis Date   • Anemia    • Blood clotting disorder (HCC)    • Chest pain 02/2019    MPI with no ischemia or infarct, LVEF 72%.   • Diabetes     Oral meds   • GERD (gastroesophageal reflux disease)    • Heart burn    • Hemorrhagic disorder (HCC)    • Hyperlipidemia    • Hypertension 2006    Medication   • Obesity    • Urinary bladder disorder    • Urinary incontinence      Past Surgical History:   Procedure Laterality Date   • ANTERIOR AND POSTERIOR REPAIR  2/22/2018    Procedure: ANTERIOR AND POSTERIOR REPAIR;  Surgeon: Angel Hernadez M.D.;  Location: SURGERY SAME DAY [facility];  Service: Gynecology   • ENTEROCELE REPAIR  2/22/2018    Procedure: ENTEROCELE REPAIR;  Surgeon: Angel Hernadez M.D.;  Location: SURGERY SAME DAY [facility];  Service: Gynecology   • VAGINAL SUSPENSION N/A 2/22/2018     Procedure: VAGINAL SUSPENSION- SACROSPINOUS VAULT;  Surgeon: Angel Hernadez M.D.;  Location: SURGERY SAME DAY [facility];  Service: Gynecology   • BLADDER SLING FEMALE N/A 2/22/2018    Procedure: BLADDER SLING FEMALE- TENSION FREE VAGINAL TAPE;  Surgeon: Angel Hernadez M.D.;  Location: SURGERY SAME DAY [facility];  Service: Gynecology   • BLADDER SLING FEMALE  10/17/2017    Procedure: BLADDER SLING FEMALE - TENSION FREE TAPE PROCEDURE;  Surgeon: Angel Hernadez M.D.;  Location: SURGERY SAME DAY [facility];  Service: Gynecology   • CYSTOSCOPY N/A 10/17/2017    Procedure: CYSTOSCOPY;  Surgeon: Angel Hernadez M.D.;  Location: SURGERY SAME DAY [facility];  Service: Gynecology   • ANTERIOR AND POSTERIOR REPAIR  10/17/2017    Procedure: ANTERIOR AND POSTERIOR REPAIR W/UPHOLD MESH;  Surgeon: Angel Hernadez M.D.;  Location: SURGERY SAME DAY [facility];  Service: Gynecology   • ENTEROCELE REPAIR  10/17/2017    Procedure: ENTEROCELE REPAIR - PERINEOPLASTY;  Surgeon: Angel Hernadez M.D.;  Location: SURGERY SAME DAY [facility];  Service: Gynecology   • VAGINAL SUSPENSION  10/17/2017    Procedure: VAGINAL SUSPENSION - SACROSPINOUS VAULT;  Surgeon: Angel Hernadez M.D.;  Location: SURGERY SAME DAY [facility];  Service: Gynecology   • BLADDER SLING FEMALE  4/11/2017    Procedure: BLADDER SLING FEMALE-TOT;  Surgeon: Angel Hernadez M.D.;  Location: SURGERY SAME DAY [facility];  Service:    • ANTERIOR AND POSTERIOR REPAIR  4/11/2017    Procedure: ANTERIOR AND POSTERIOR REPAIR;  Surgeon: Angel Hernadez M.D.;  Location: SURGERY SAME DAY [facility];  Service:    • ENTEROCELE REPAIR  4/11/2017    Procedure: ENTEROCELE REPAIR- PERINEOPLASTY;  Surgeon: Angel Hernadez M.D.;  Location: SURGERY SAME DAY [facility];  Service:    • VAGINAL SUSPENSION  4/11/2017    Procedure: VAGINAL SUSPENSION-SACROSPINOUS VAULT;  Surgeon: Angel Hernadez M.D.;  Location: SURGERY SAME DAY [facility];  Service:    •  BLADDER SLING FEMALE  10/25/2016    Procedure: BLADDER SLING FEMALE TOT;  Surgeon: Angel Hernadez M.D.;  Location: SURGERY SAME DAY [facility];  Service:    • VAGINAL HYSTERECTOMY SCOPE TOTAL  10/25/2016    Procedure: VAGINAL HYSTERECTOMY SCOPE TOTAL;  Surgeon: Angel Hernadez M.D.;  Location: SURGERY SAME DAY [facility];  Service:    • SALPINGECTOMY Bilateral 10/25/2016    Procedure: SALPINGECTOMY;  Surgeon: Angel Hernadez M.D.;  Location: SURGERY SAME DAY [facility];  Service:    • ANTERIOR AND POSTERIOR REPAIR  10/25/2016    Procedure: ANTERIOR AND POSTERIOR REPAIR;  Surgeon: Angel Hernadez M.D.;  Location: SURGERY SAME DAY [facility];  Service:    • ENTEROCELE REPAIR  10/25/2016    Procedure: ENTEROCELE REPAIR, PERINEOPLASTY;  Surgeon: Angel Hernadez M.D.;  Location: SURGERY SAME DAY [facility];  Service:    • VAGINAL SUSPENSION  10/25/2016    Procedure: VAGINAL SUSPENSION SACROSPINOUS VAULT ;  Surgeon: Angel Hernadez M.D.;  Location: SURGERY SAME DAY [facility];  Service:    • OTHER      Tubal ligation     Family History   Problem Relation Age of Onset   • Diabetes Mother         pills   • Hypertension Mother    • Diabetes Father         insulin   • Diabetes Paternal Grandmother    • Diabetes Paternal Aunt      Social History     Socioeconomic History   • Marital status: Single     Spouse name: Not on file   • Number of children: Not on file   • Years of education: Not on file   • Highest education level: Not on file   Occupational History   • Not on file   Social Needs   • Financial resource strain: Not on file   • Food insecurity     Worry: Not on file     Inability: Not on file   • Transportation needs     Medical: Not on file     Non-medical: Not on file   Tobacco Use   • Smoking status: Never Smoker   • Smokeless tobacco: Never Used   Substance and Sexual Activity   • Alcohol use: No   • Drug use: No   • Sexual activity: Yes     Partners: Male   Lifestyle   • Physical activity      Days per week: Not on file     Minutes per session: Not on file   • Stress: Not on file   Relationships   • Social connections     Talks on phone: Not on file     Gets together: Not on file     Attends Orthodoxy service: Not on file     Active member of club or organization: Not on file     Attends meetings of clubs or organizations: Not on file     Relationship status: Not on file   • Intimate partner violence     Fear of current or ex partner: Not on file     Emotionally abused: Not on file     Physically abused: Not on file     Forced sexual activity: Not on file   Other Topics Concern   • Not on file   Social History Narrative   • Not on file     Allergies   Allergen Reactions   • Latex      Condons, rash   • Metformin Rash     Rash       Outpatient Encounter Medications as of 4/22/2020   Medication Sig Dispense Refill   • lisinopril (PRINIVIL) 10 MG Tab Take 10 mg by mouth every day.     • linagliptin (TRADJENTA) 5 MG Tab tablet Take 5 mg by mouth every day.     • metformin (GLUCOPHAGE) 1000 MG tablet Take 1,000 mg by mouth 2 times a day, with meals.     • fluticasone (FLONASE) 50 MCG/ACT nasal spray Spray 1 Spray in nose every day.     • NIFEdipine (ADALAT CC) 30 MG CR tablet Take 30 mg by mouth every day.     • atorvastatin (LIPITOR) 40 MG Tab Take 40 mg by mouth every evening.     • raNITidine (ZANTAC) 150 MG Tab Take 150 mg by mouth 2 times a day.     • albuterol 108 (90 Base) MCG/ACT Aero Soln inhalation aerosol Inhale 2 Puffs by mouth every 6 hours as needed for Shortness of Breath.     • metoprolol SR (TOPROL XL) 50 MG TABLET SR 24 HR Take 1 Tab by mouth 1 time daily as needed (palpitations). 30 Tab 1   • Insulin Glargine (BASAGLAR KWIKPEN) 100 UNIT/ML Solution Pen-injector      • aspirin EC (ECOTRIN) 81 MG Tablet Delayed Response Take 81 mg by mouth every morning.     • [DISCONTINUED] atorvastatin (LIPITOR) 20 MG Tab Take 1 Tab by mouth every evening. 30 Tab    • [DISCONTINUED] diazePAM (VALIUM) 5 MG  "Tab Take 5 mg by mouth 1 time daily as needed.  0   • [DISCONTINUED] ketorolac (TORADOL) 10 MG Tab Take 10 mg by mouth 1 time daily as needed.  0   • ONE TOUCH ULTRA TEST strip TEST BLOOD SUGAR QAM  1   • ONETOUCH DELICA LANCETS 33G Misc U UTD QAM  5   • [DISCONTINUED] glimepiride (AMARYL) 2 MG Tab TK 1 T PO QD FOR DIABETES  3   • [DISCONTINUED] ibuprofen (MOTRIN) 600 MG Tab TK 1 T PO TID  0   • [DISCONTINUED] losartan (COZAAR) 100 MG Tab Take 100 mg by mouth every day.       No facility-administered encounter medications on file as of 4/22/2020.      Review of Systems   Constitutional: Negative for chills and fever.   HENT: Negative for congestion.    Respiratory: Negative for cough and shortness of breath.    Cardiovascular: Negative for chest pain, palpitations, orthopnea, leg swelling and PND.   Gastrointestinal: Negative for abdominal pain and nausea.   Musculoskeletal: Negative for myalgias.   Skin: Negative for rash.   Neurological: Negative for dizziness, loss of consciousness and headaches.   Endo/Heme/Allergies: Does not bruise/bleed easily.   Psychiatric/Behavioral: The patient does not have insomnia.         Objective:   /82 (BP Location: Left arm, Patient Position: Sitting, BP Cuff Size: Adult)   Pulse 78   Ht 1.626 m (5' 4\")   Wt 102.1 kg (225 lb)   LMP 10/13/2016 (Exact Date)   SpO2 96%   BMI 38.62 kg/m²     Physical Exam   Constitutional: She is oriented to person, place, and time. She appears well-developed and well-nourished.   HENT:   Head: Normocephalic.   Eyes: EOM are normal.   Neck: Normal range of motion. Neck supple. No JVD present.   Cardiovascular: Normal rate, regular rhythm and normal heart sounds.   Pulmonary/Chest: Effort normal and breath sounds normal. No respiratory distress. She has no wheezes. She has no rales.   Abdominal: Soft. Bowel sounds are normal. She exhibits no distension. There is no abdominal tenderness.   Musculoskeletal: Normal range of motion.         " General: No edema.   Neurological: She is alert and oriented to person, place, and time.   Skin: Skin is warm and dry. No rash noted.   Psychiatric: She has a normal mood and affect.     IMPRESSION OF CHEST XRAY OF 4/20/2020:  Negative two views of the chest.    NUCLEAR IMAGING INTERPRETATION OF MPI OF 2/15/2019:   No evidence of significant jeopardized viable myocardium or prior myocardial    infarction.   Normal left ventricular size, ejection fraction, and wall motion.   ECG INTERPRETATION   Negative stress ECG for ischemia.    Component      Latest Ref Rng & Units 4/20/2020 4/20/2020 4/20/2020 4/20/2020          11:53 PM 11:53 PM 11:53 PM 11:53 PM   WBC      4.8 - 10.8 K/uL 10.4      RBC      4.20 - 5.40 M/uL 4.86      Hemoglobin      12.0 - 16.0 g/dL 13.7      Hematocrit      37.0 - 47.0 % 40.5      MCV      81.4 - 97.8 fL 83.3      MCH      27.0 - 33.0 pg 28.2      MCHC      33.6 - 35.0 g/dL 33.8      RDW      35.9 - 50.0 fL 39.5      Platelet Count      164 - 446 K/uL 252      MPV      9.0 - 12.9 fL 10.8      Neutrophils-Polys      44.00 - 72.00 % 52.50      Lymphocytes      22.00 - 41.00 % 41.60 (H)      Monocytes      0.00 - 13.40 % 4.00      Eosinophils      0.00 - 6.90 % 1.30      Basophils      0.00 - 1.80 % 0.40      Immature Granulocytes      0.00 - 0.90 % 0.20      Nucleated RBC      /100 WBC 0.00      Neutrophils (Absolute)      2.00 - 7.15 K/uL 5.44      Lymphs (Absolute)      1.00 - 4.80 K/uL 4.31      Monos (Absolute)      0.00 - 0.85 K/uL 0.41      Eos (Absolute)      0.00 - 0.51 K/uL 0.13      Baso (Absolute)      0.00 - 0.12 K/uL 0.04      Immature Granulocytes (abs)      0.00 - 0.11 K/uL 0.02      NRBC (Absolute)      K/uL 0.00      Sodium      135 - 145 mmol/L   139    Potassium      3.6 - 5.5 mmol/L   3.4 (L)    Chloride      96 - 112 mmol/L   100    Co2      20 - 33 mmol/L   22    Anion Gap      7.0 - 16.0   17.0 (H)    Glucose      65 - 99 mg/dL   179 (H)    Bun      8 - 22 mg/dL   11     Creatinine      0.50 - 1.40 mg/dL   0.62    Calcium      8.5 - 10.5 mg/dL   9.6    AST(SGOT)      12 - 45 U/L   20    ALT(SGPT)      2 - 50 U/L   33    Alkaline Phosphatase      30 - 99 U/L   94    Total Bilirubin      0.1 - 1.5 mg/dL   0.3    Albumin      3.2 - 4.9 g/dL   4.3    Total Protein      6.0 - 8.2 g/dL   7.6    Globulin      1.9 - 3.5 g/dL   3.3    A-G Ratio      g/dL   1.3    Troponin T      6 - 19 ng/L    <6   D-Dimer Screen      0.00 - 0.50 ug/mL (FEU)  0.37     NT-proBNP      0 - 125 pg/mL 43        LABS AS OF 8/11/2019:  Cholesterol 184  Triglycerides 121  HDL 47      Assessment:     1. Stable angina pectoris (HCC)     2. Palpitations     3. Essential hypertension, benign     4. Mixed hyperlipidemia     5. Type 2 diabetes mellitus without complication, with long-term current use of insulin (HCC)         Medical Decision Making:  Today's Assessment / Status / Plan:     1. History of chest pain, with normal/negative MPI in February 2019. Recent ER visit, she had undetectable troponin and normal BNP, along with normal EKG and CXR. She is reassured. She has not had any recurrence.    2. Palpitations, probably secondary to Flovent inhaler. Again, she is reassured, as she does have relief with this medication.    3. Hypertension, treated and stable. BP is good today.    4. Hyperlipidemia, treated with Lipitor. Last lipid panel was good. Given she is a diabetic, she is urged to keep working better glucose control, dietary changes and weight loss.    5. Diabetes mellitus, treated, slowly improving.    Same medications for now. Reassured regarding tests done in the ER. Keep August 2020 FU as scheduled, just to make sure she is not having any further symptoms. FU sooner if clinical condition changes.    Collaborating MD: Shirley

## 2020-05-12 DIAGNOSIS — R00.2 PALPITATIONS: Primary | ICD-10-CM

## 2020-05-12 RX ORDER — METOPROLOL SUCCINATE 50 MG/1
50 TABLET, EXTENDED RELEASE ORAL
Qty: 90 TAB | Refills: 3 | Status: SHIPPED | OUTPATIENT
Start: 2020-05-12 | End: 2021-04-06

## 2020-08-01 ENCOUNTER — HOSPITAL ENCOUNTER (EMERGENCY)
Dept: HOSPITAL 8 - ED | Age: 45
Discharge: HOME | End: 2020-08-01
Payer: MEDICAID

## 2020-08-01 VITALS — DIASTOLIC BLOOD PRESSURE: 88 MMHG | SYSTOLIC BLOOD PRESSURE: 165 MMHG

## 2020-08-01 VITALS — BODY MASS INDEX: 38.49 KG/M2 | HEIGHT: 64 IN | WEIGHT: 225.47 LBS

## 2020-08-01 DIAGNOSIS — K21.9: ICD-10-CM

## 2020-08-01 DIAGNOSIS — E11.9: ICD-10-CM

## 2020-08-01 DIAGNOSIS — R07.89: Primary | ICD-10-CM

## 2020-08-01 DIAGNOSIS — I10: ICD-10-CM

## 2020-08-01 DIAGNOSIS — R94.31: ICD-10-CM

## 2020-08-01 DIAGNOSIS — Z98.51: ICD-10-CM

## 2020-08-01 DIAGNOSIS — R20.2: ICD-10-CM

## 2020-08-01 DIAGNOSIS — E78.5: ICD-10-CM

## 2020-08-01 LAB
ALBUMIN SERPL-MCNC: 3.4 G/DL (ref 3.4–5)
ALP SERPL-CCNC: 94 U/L (ref 45–117)
ALT SERPL-CCNC: 41 U/L (ref 12–78)
ANION GAP SERPL CALC-SCNC: 7 MMOL/L (ref 5–15)
BASOPHILS # BLD AUTO: 0.04 X10^3/UL (ref 0–0.1)
BASOPHILS NFR BLD AUTO: 0 % (ref 0–1)
BILIRUB SERPL-MCNC: 0.2 MG/DL (ref 0.2–1)
CALCIUM SERPL-MCNC: 8.7 MG/DL (ref 8.5–10.1)
CHLORIDE SERPL-SCNC: 105 MMOL/L (ref 98–107)
CREAT SERPL-MCNC: 0.62 MG/DL (ref 0.55–1.02)
EOSINOPHIL # BLD AUTO: 0.12 X10^3/UL (ref 0–0.4)
EOSINOPHIL NFR BLD AUTO: 1 % (ref 1–7)
ERYTHROCYTE [DISTWIDTH] IN BLOOD BY AUTOMATED COUNT: 13.1 % (ref 9.6–15.2)
LYMPHOCYTES # BLD AUTO: 3.05 X10^3/UL (ref 1–3.4)
LYMPHOCYTES NFR BLD AUTO: 34 % (ref 22–44)
MCH RBC QN AUTO: 27.7 PG (ref 27–34.8)
MCHC RBC AUTO-ENTMCNC: 32.6 G/DL (ref 32.4–35.8)
MCV RBC AUTO: 84.9 FL (ref 80–100)
MD: NO
MONOCYTES # BLD AUTO: 0.36 X10^3/UL (ref 0.2–0.8)
MONOCYTES NFR BLD AUTO: 4 % (ref 2–9)
NEUTROPHILS # BLD AUTO: 5.49 X10^3/UL (ref 1.8–6.8)
NEUTROPHILS NFR BLD AUTO: 61 % (ref 42–75)
PLATELET # BLD AUTO: 232 X10^3/UL (ref 130–400)
PMV BLD AUTO: 9.9 FL (ref 7.4–10.4)
PROT SERPL-MCNC: 7.8 G/DL (ref 6.4–8.2)
RBC # BLD AUTO: 4.88 X10^6/UL (ref 3.82–5.3)
TROPONIN I SERPL-MCNC: < 0.015 NG/ML (ref 0–0.04)

## 2020-08-01 PROCEDURE — 85025 COMPLETE CBC W/AUTO DIFF WBC: CPT

## 2020-08-01 PROCEDURE — 99285 EMERGENCY DEPT VISIT HI MDM: CPT

## 2020-08-01 PROCEDURE — 84443 ASSAY THYROID STIM HORMONE: CPT

## 2020-08-01 PROCEDURE — 80053 COMPREHEN METABOLIC PANEL: CPT

## 2020-08-01 PROCEDURE — 36415 COLL VENOUS BLD VENIPUNCTURE: CPT

## 2020-08-01 PROCEDURE — 71046 X-RAY EXAM CHEST 2 VIEWS: CPT

## 2020-08-01 PROCEDURE — 70450 CT HEAD/BRAIN W/O DYE: CPT

## 2020-08-01 PROCEDURE — 93005 ELECTROCARDIOGRAM TRACING: CPT

## 2020-08-01 PROCEDURE — 84484 ASSAY OF TROPONIN QUANT: CPT

## 2020-08-01 NOTE — NUR
PT HERE FOR LEFT ARM AND LEG PAIN THAT STARTED AT 2200 LAST NIGHT. PT SAYS SHE 
HAS HAD SIMILAR SYMPTOMS IN THE PAST BUT TONIGHT IS WORSE. BP ELEVATED. OTHER 
VSS. PT HAS EQUAL . PT DENIES CHEST PAIN. LAB AT BEDSIDE

## 2020-08-01 NOTE — NUR
PT TO BE DISCHARGED. PT VERBALIZED UNDERSTANDING OF DISCHARGE AND FOLLOW UP 
INSTRUCTIONS. PT GETTING DRESSED.

## 2020-08-11 ENCOUNTER — OFFICE VISIT (OUTPATIENT)
Dept: CARDIOLOGY | Facility: MEDICAL CENTER | Age: 45
End: 2020-08-11
Payer: COMMERCIAL

## 2020-08-11 VITALS
DIASTOLIC BLOOD PRESSURE: 88 MMHG | BODY MASS INDEX: 38.41 KG/M2 | OXYGEN SATURATION: 95 % | HEIGHT: 64 IN | SYSTOLIC BLOOD PRESSURE: 136 MMHG | WEIGHT: 225 LBS | HEART RATE: 66 BPM

## 2020-08-11 DIAGNOSIS — I20.89 STABLE ANGINA PECTORIS (HCC): ICD-10-CM

## 2020-08-11 DIAGNOSIS — E78.2 MIXED HYPERLIPIDEMIA: ICD-10-CM

## 2020-08-11 DIAGNOSIS — I10 ESSENTIAL HYPERTENSION, BENIGN: ICD-10-CM

## 2020-08-11 DIAGNOSIS — Z79.4 TYPE 2 DIABETES MELLITUS WITHOUT COMPLICATION, WITH LONG-TERM CURRENT USE OF INSULIN (HCC): ICD-10-CM

## 2020-08-11 DIAGNOSIS — E11.9 TYPE 2 DIABETES MELLITUS WITHOUT COMPLICATION, WITH LONG-TERM CURRENT USE OF INSULIN (HCC): ICD-10-CM

## 2020-08-11 DIAGNOSIS — R00.2 PALPITATIONS: ICD-10-CM

## 2020-08-11 PROCEDURE — 99214 OFFICE O/P EST MOD 30 MIN: CPT | Performed by: NURSE PRACTITIONER

## 2020-08-11 RX ORDER — LOSARTAN POTASSIUM 25 MG/1
25 TABLET ORAL DAILY
COMMUNITY
Start: 2020-08-10 | End: 2021-02-16

## 2020-08-11 RX ORDER — EMPAGLIFLOZIN 10 MG/1
10 TABLET, FILM COATED ORAL DAILY
COMMUNITY
Start: 2020-06-24 | End: 2020-11-11

## 2020-08-11 ASSESSMENT — ENCOUNTER SYMPTOMS
INSOMNIA: 0
SHORTNESS OF BREATH: 0
ORTHOPNEA: 0
FEVER: 0
BRUISES/BLEEDS EASILY: 0
HEADACHES: 0
PALPITATIONS: 0
ABDOMINAL PAIN: 0
LOSS OF CONSCIOUSNESS: 0
NAUSEA: 0
MYALGIAS: 0
COUGH: 0
DIZZINESS: 0
PND: 0
CHILLS: 0

## 2020-08-11 ASSESSMENT — FIBROSIS 4 INDEX: FIB4 SCORE: 0.65

## 2020-08-11 NOTE — PROGRESS NOTES
Chief Complaint   Patient presents with   • Follow-Up   • HTN (Controlled)   • Palpitations   • Hyperlipidemia       Subjective:   Tanya Barrow is a 45 y.o. female who presents today for four month follow-up of hypertension, hyperlipidemia, and diabetes.    Tanya is a 45 year old female with history of hypertension, hyperlipidemia and diabetes (all treated). She also has a history of chest pain, with previous MPI testing in 2015 and 2019 was normal.    Since the last visit, she had some headache, nausea and numbness, and presented to [facility] ER; she had a CT scan of the head that was normal. Apparently, she recently received a letter from [employer] that a hand  that she bought was recalled and caused all the symptoms. She has stopped using it, and symptoms have improved.    She is here today for follow-up. Glucose is improving, running 110-140. No chest pain, pressure or discomfort. No more palpitations.  BP has been stable at home. No shortness of breath, orthopnea or PND; no dizziness or syncope; no edema. She is trying to exercise more; she is not currently working, so her stress level is better.  She is not currently taking her statin.    Past Medical History:   Diagnosis Date   • Anemia    • Blood clotting disorder (HCC)    • Chest pain 02/2019    MPI with no ischemia or infarct, LVEF 72%.   • Diabetes     Oral meds   • GERD (gastroesophageal reflux disease)    • Heart burn    • Hemorrhagic disorder (HCC)    • Hyperlipidemia    • Hypertension 2006    Medication   • Obesity    • Urinary bladder disorder    • Urinary incontinence      Past Surgical History:   Procedure Laterality Date   • ANTERIOR AND POSTERIOR REPAIR  2/22/2018    Procedure: ANTERIOR AND POSTERIOR REPAIR;  Surgeon: Angel Hernadez M.D.;  Location: SURGERY SAME DAY [facility];  Service: Gynecology   • ENTEROCELE REPAIR  2/22/2018    Procedure: ENTEROCELE REPAIR;  Surgeon: Angel Hernadez M.D.;  Location: SURGERY SAME  DAY [facility];  Service: Gynecology   • VAGINAL SUSPENSION N/A 2/22/2018    Procedure: VAGINAL SUSPENSION- SACROSPINOUS VAULT;  Surgeon: Angel Hernadez M.D.;  Location: SURGERY SAME DAY [facility];  Service: Gynecology   • BLADDER SLING FEMALE N/A 2/22/2018    Procedure: BLADDER SLING FEMALE- TENSION FREE VAGINAL TAPE;  Surgeon: Angel Hernadez M.D.;  Location: SURGERY SAME DAY [facility];  Service: Gynecology   • BLADDER SLING FEMALE  10/17/2017    Procedure: BLADDER SLING FEMALE - TENSION FREE TAPE PROCEDURE;  Surgeon: Angel Hernadez M.D.;  Location: SURGERY SAME DAY [facility];  Service: Gynecology   • CYSTOSCOPY N/A 10/17/2017    Procedure: CYSTOSCOPY;  Surgeon: Angel Hernadez M.D.;  Location: SURGERY SAME DAY [facility];  Service: Gynecology   • ANTERIOR AND POSTERIOR REPAIR  10/17/2017    Procedure: ANTERIOR AND POSTERIOR REPAIR W/UPHOLD MESH;  Surgeon: Angel Hernadez M.D.;  Location: SURGERY SAME DAY [facility];  Service: Gynecology   • ENTEROCELE REPAIR  10/17/2017    Procedure: ENTEROCELE REPAIR - PERINEOPLASTY;  Surgeon: Angel Hernadez M.D.;  Location: SURGERY SAME DAY [facility];  Service: Gynecology   • VAGINAL SUSPENSION  10/17/2017    Procedure: VAGINAL SUSPENSION - SACROSPINOUS VAULT;  Surgeon: Angel Hernadez M.D.;  Location: SURGERY SAME DAY [facility];  Service: Gynecology   • BLADDER SLING FEMALE  4/11/2017    Procedure: BLADDER SLING FEMALE-TOT;  Surgeon: Angel Hernadez M.D.;  Location: SURGERY SAME DAY [facility];  Service:    • ANTERIOR AND POSTERIOR REPAIR  4/11/2017    Procedure: ANTERIOR AND POSTERIOR REPAIR;  Surgeon: Angel Hernadez M.D.;  Location: SURGERY SAME DAY [facility];  Service:    • ENTEROCELE REPAIR  4/11/2017    Procedure: ENTEROCELE REPAIR- PERINEOPLASTY;  Surgeon: Angel Hernadez M.D.;  Location: SURGERY SAME DAY [facility];  Service:    • VAGINAL SUSPENSION  4/11/2017    Procedure: VAGINAL SUSPENSION-SACROSPINOUS VAULT;  Surgeon: Angel  KESHA Hernadez M.D.;  Location: SURGERY SAME DAY [facility];  Service:    • BLADDER SLING FEMALE  10/25/2016    Procedure: BLADDER SLING FEMALE TOT;  Surgeon: Angel Hernadez M.D.;  Location: SURGERY SAME DAY [facility];  Service:    • VAGINAL HYSTERECTOMY SCOPE TOTAL  10/25/2016    Procedure: VAGINAL HYSTERECTOMY SCOPE TOTAL;  Surgeon: Angel Hernadez M.D.;  Location: SURGERY SAME DAY [facility];  Service:    • SALPINGECTOMY Bilateral 10/25/2016    Procedure: SALPINGECTOMY;  Surgeon: Angel Hernadez M.D.;  Location: SURGERY SAME DAY [facility];  Service:    • ANTERIOR AND POSTERIOR REPAIR  10/25/2016    Procedure: ANTERIOR AND POSTERIOR REPAIR;  Surgeon: Angel Hernadez M.D.;  Location: SURGERY SAME DAY [facility];  Service:    • ENTEROCELE REPAIR  10/25/2016    Procedure: ENTEROCELE REPAIR, PERINEOPLASTY;  Surgeon: Angel Hernadez M.D.;  Location: SURGERY SAME DAY [facility];  Service:    • VAGINAL SUSPENSION  10/25/2016    Procedure: VAGINAL SUSPENSION SACROSPINOUS VAULT ;  Surgeon: Angel Hernadez M.D.;  Location: SURGERY SAME DAY [facility];  Service:    • OTHER      Tubal ligation     Family History   Problem Relation Age of Onset   • Diabetes Mother         pills   • Hypertension Mother    • Diabetes Father         insulin   • Diabetes Paternal Grandmother    • Diabetes Paternal Aunt      Social History     Socioeconomic History   • Marital status: Single     Spouse name: Not on file   • Number of children: Not on file   • Years of education: Not on file   • Highest education level: Not on file   Occupational History   • Not on file   Social Needs   • Financial resource strain: Not on file   • Food insecurity     Worry: Not on file     Inability: Not on file   • Transportation needs     Medical: Not on file     Non-medical: Not on file   Tobacco Use   • Smoking status: Never Smoker   • Smokeless tobacco: Never Used   Substance and Sexual Activity   • Alcohol use: No   • Drug use: No   • Sexual  activity: Yes     Partners: Male   Lifestyle   • Physical activity     Days per week: Not on file     Minutes per session: Not on file   • Stress: Not on file   Relationships   • Social connections     Talks on phone: Not on file     Gets together: Not on file     Attends Rastafari service: Not on file     Active member of club or organization: Not on file     Attends meetings of clubs or organizations: Not on file     Relationship status: Not on file   • Intimate partner violence     Fear of current or ex partner: Not on file     Emotionally abused: Not on file     Physically abused: Not on file     Forced sexual activity: Not on file   Other Topics Concern   • Not on file   Social History Narrative   • Not on file     Allergies   Allergen Reactions   • Latex      Condons, rash     Outpatient Encounter Medications as of 8/11/2020   Medication Sig Dispense Refill   • JARDIANCE 10 MG Tab Take 10 mg by mouth every day.     • metoprolol SR (TOPROL XL) 50 MG TABLET SR 24 HR Take 1 Tab by mouth 1 time daily as needed (palpitations). 90 Tab 3   • metformin (GLUCOPHAGE) 1000 MG tablet Take 1,000 mg by mouth 2 times a day, with meals.     • fluticasone (FLONASE) 50 MCG/ACT nasal spray Spray 1 Spray in nose every day.     • atorvastatin (LIPITOR) 40 MG Tab Take 40 mg by mouth every evening.     • ONE TOUCH ULTRA TEST strip TEST BLOOD SUGAR QAM  1   • Insulin Glargine (BASAGLAR KWIKPEN) 100 UNIT/ML Solution Pen-injector      • ONETOUCH DELICA LANCETS 33G Misc U UTD QAM  5   • aspirin EC (ECOTRIN) 81 MG Tablet Delayed Response Take 81 mg by mouth every morning.     • lisinopril (PRINIVIL) 10 MG Tab Take 10 mg by mouth every day.     • NIFEdipine (ADALAT CC) 30 MG CR tablet Take 30 mg by mouth every day.     • raNITidine (ZANTAC) 150 MG Tab Take 150 mg by mouth 2 times a day.     • albuterol 108 (90 Base) MCG/ACT Aero Soln inhalation aerosol Inhale 2 Puffs by mouth every 6 hours as needed for Shortness of Breath.     •  "[DISCONTINUED] linagliptin (TRADJENTA) 5 MG Tab tablet Take 5 mg by mouth every day.       No facility-administered encounter medications on file as of 8/11/2020.      Review of Systems   Constitutional: Negative for chills and fever.   HENT: Negative for congestion.    Respiratory: Negative for cough and shortness of breath.    Cardiovascular: Negative for chest pain, palpitations, orthopnea, leg swelling and PND.   Gastrointestinal: Negative for abdominal pain and nausea.   Musculoskeletal: Negative for myalgias.   Skin: Negative for rash.   Neurological: Negative for dizziness, loss of consciousness and headaches.   Endo/Heme/Allergies: Does not bruise/bleed easily.   Psychiatric/Behavioral: The patient does not have insomnia.         Objective:   /88 (BP Location: Left arm, Patient Position: Sitting, BP Cuff Size: Large adult)   Pulse 66   Ht 1.626 m (5' 4\")   Wt 102.1 kg (225 lb)   LMP 10/13/2016 (Exact Date)   SpO2 95%   BMI 38.62 kg/m²     Physical Exam   Constitutional: She is oriented to person, place, and time. She appears well-developed and well-nourished.   BMI 38.62   HENT:   Head: Normocephalic.   Eyes: EOM are normal.   Neck: Normal range of motion. Neck supple. No JVD present.   Cardiovascular: Normal rate, regular rhythm and normal heart sounds.   Pulmonary/Chest: Effort normal and breath sounds normal. No respiratory distress. She has no wheezes. She has no rales.   Abdominal: Soft. Bowel sounds are normal. She exhibits no distension. There is no abdominal tenderness.   Musculoskeletal: Normal range of motion.         General: No edema.   Neurological: She is alert and oriented to person, place, and time.   Skin: Skin is warm and dry. No rash noted.   Psychiatric: She has a normal mood and affect.     Have requested records from [facility].    NUCLEAR IMAGING INTERPRETATION OF MPI OF 2/15/2019:   No evidence of significant jeopardized viable myocardium or prior myocardial "    infarction.   Normal left ventricular size, ejection fraction, and wall motion.   ECG INTERPRETATION   Negative stress ECG for ischemia.     No results found for: CHOLSTRLTOT, LDL, HDL, TRIGLYCERIDE    Lab Results   Component Value Date/Time    SODIUM 139 04/20/2020 11:53 PM    POTASSIUM 3.4 (L) 04/20/2020 11:53 PM    CHLORIDE 100 04/20/2020 11:53 PM    CO2 22 04/20/2020 11:53 PM    GLUCOSE 179 (H) 04/20/2020 11:53 PM    BUN 11 04/20/2020 11:53 PM    CREATININE 0.62 04/20/2020 11:53 PM    CREATININE 0.6 05/27/2007 12:01 AM     Lab Results   Component Value Date/Time    ALKPHOSPHAT 94 04/20/2020 11:53 PM    ASTSGOT 20 04/20/2020 11:53 PM    ALTSGPT 33 04/20/2020 11:53 PM    TBILIRUBIN 0.3 04/20/2020 11:53 PM        Assessment:     1. Essential hypertension, benign     2. Palpitations     3. Mixed hyperlipidemia  Comp Metabolic Panel    Lipid Profile   4. Type 2 diabetes mellitus without complication, with long-term current use of insulin (HCC)     5. Stable angina pectoris (HCC)         Medical Decision Making:  Today's Assessment / Status / Plan:     1. Hypertension, treated and stable. BP was initially elevated this morning, but it came down nicely.    2. Palpitations, stable and improved.    3. Hyperlipidemia, not currently on any therapy. To recheck fasting CMP and lipid panel.    4. Diabetes mellitus, treated, improving.    5. History of chest pain, with normal MPI in 2019 and 2015; she is given reassruance.    Same medications for now. Labs as above. FU in 6 months, sooner if clinical condition changes.    Collaborating MD: Lisa

## 2020-08-13 DIAGNOSIS — E78.2 MIXED HYPERLIPIDEMIA: ICD-10-CM

## 2020-11-09 ENCOUNTER — HOSPITAL ENCOUNTER (OUTPATIENT)
Facility: MEDICAL CENTER | Age: 45
End: 2020-11-09
Attending: SURGERY | Admitting: SURGERY
Payer: COMMERCIAL

## 2020-11-11 ENCOUNTER — PRE-ADMISSION TESTING (OUTPATIENT)
Dept: ADMISSIONS | Facility: MEDICAL CENTER | Age: 45
End: 2020-11-11
Attending: SURGERY
Payer: COMMERCIAL

## 2020-11-11 VITALS — WEIGHT: 227.96 LBS | HEIGHT: 65 IN | BODY MASS INDEX: 37.98 KG/M2

## 2020-11-11 DIAGNOSIS — Z01.812 PRE-OPERATIVE LABORATORY EXAMINATION: ICD-10-CM

## 2020-11-11 DIAGNOSIS — Z01.810 PRE-OPERATIVE CARDIOVASCULAR EXAMINATION: ICD-10-CM

## 2020-11-11 LAB
ANION GAP SERPL CALC-SCNC: 10 MMOL/L (ref 7–16)
BUN SERPL-MCNC: 11 MG/DL (ref 8–22)
CALCIUM SERPL-MCNC: 9.5 MG/DL (ref 8.5–10.5)
CHLORIDE SERPL-SCNC: 97 MMOL/L (ref 96–112)
CO2 SERPL-SCNC: 27 MMOL/L (ref 20–33)
COVID ORDER STATUS COVID19: NORMAL
CREAT SERPL-MCNC: 0.85 MG/DL (ref 0.5–1.4)
EKG IMPRESSION: NORMAL
ERYTHROCYTE [DISTWIDTH] IN BLOOD BY AUTOMATED COUNT: 41.4 FL (ref 35.9–50)
EST. AVERAGE GLUCOSE BLD GHB EST-MCNC: 180 MG/DL
GLUCOSE SERPL-MCNC: 220 MG/DL (ref 65–99)
HBA1C MFR BLD: 7.9 % (ref 0–5.6)
HCT VFR BLD AUTO: 43.6 % (ref 37–47)
HGB BLD-MCNC: 13.9 G/DL (ref 12–16)
MCH RBC QN AUTO: 27.4 PG (ref 27–33)
MCHC RBC AUTO-ENTMCNC: 31.9 G/DL (ref 33.6–35)
MCV RBC AUTO: 86 FL (ref 81.4–97.8)
PLATELET # BLD AUTO: 185 K/UL (ref 164–446)
PMV BLD AUTO: 11.7 FL (ref 9–12.9)
POTASSIUM SERPL-SCNC: 3.8 MMOL/L (ref 3.6–5.5)
RBC # BLD AUTO: 5.07 M/UL (ref 4.2–5.4)
SARS-COV-2 RNA RESP QL NAA+PROBE: DETECTED
SODIUM SERPL-SCNC: 134 MMOL/L (ref 135–145)
SPECIMEN SOURCE: ABNORMAL
WBC # BLD AUTO: 4.1 K/UL (ref 4.8–10.8)

## 2020-11-11 PROCEDURE — 36415 COLL VENOUS BLD VENIPUNCTURE: CPT

## 2020-11-11 PROCEDURE — 93005 ELECTROCARDIOGRAM TRACING: CPT

## 2020-11-11 PROCEDURE — 83036 HEMOGLOBIN GLYCOSYLATED A1C: CPT

## 2020-11-11 PROCEDURE — 93010 ELECTROCARDIOGRAM REPORT: CPT | Performed by: INTERNAL MEDICINE

## 2020-11-11 PROCEDURE — U0003 INFECTIOUS AGENT DETECTION BY NUCLEIC ACID (DNA OR RNA); SEVERE ACUTE RESPIRATORY SYNDROME CORONAVIRUS 2 (SARS-COV-2) (CORONAVIRUS DISEASE [COVID-19]), AMPLIFIED PROBE TECHNIQUE, MAKING USE OF HIGH THROUGHPUT TECHNOLOGIES AS DESCRIBED BY CMS-2020-01-R: HCPCS

## 2020-11-11 PROCEDURE — 80048 BASIC METABOLIC PNL TOTAL CA: CPT

## 2020-11-11 PROCEDURE — 85027 COMPLETE CBC AUTOMATED: CPT

## 2020-11-11 RX ORDER — ALBUTEROL SULFATE 90 UG/1
2 AEROSOL, METERED RESPIRATORY (INHALATION) EVERY 6 HOURS PRN
COMMUNITY
End: 2022-09-20

## 2020-11-11 ASSESSMENT — FIBROSIS 4 INDEX: FIB4 SCORE: 0.65

## 2020-11-12 NOTE — OR NURSING
Pt informed of positive Covid result, pt denies any symptoms. Pt informed to follow up with surgeon and to inform the Health Department, pt verbalizes understanding. RN also instructed pt to self isolate. Kezia from Dr Copeland's office informed of positive result.

## 2020-11-15 ENCOUNTER — APPOINTMENT (OUTPATIENT)
Dept: RADIOLOGY | Facility: MEDICAL CENTER | Age: 45
End: 2020-11-15
Payer: COMMERCIAL

## 2020-11-15 ENCOUNTER — APPOINTMENT (OUTPATIENT)
Dept: RADIOLOGY | Facility: MEDICAL CENTER | Age: 45
End: 2020-11-15
Attending: EMERGENCY MEDICINE
Payer: COMMERCIAL

## 2020-11-15 ENCOUNTER — HOSPITAL ENCOUNTER (EMERGENCY)
Facility: MEDICAL CENTER | Age: 45
End: 2020-11-15
Attending: EMERGENCY MEDICINE
Payer: COMMERCIAL

## 2020-11-15 VITALS
HEART RATE: 72 BPM | DIASTOLIC BLOOD PRESSURE: 82 MMHG | WEIGHT: 220 LBS | HEIGHT: 65 IN | TEMPERATURE: 97.8 F | SYSTOLIC BLOOD PRESSURE: 162 MMHG | RESPIRATION RATE: 22 BRPM | BODY MASS INDEX: 36.65 KG/M2 | OXYGEN SATURATION: 96 %

## 2020-11-15 DIAGNOSIS — R06.02 SHORTNESS OF BREATH: ICD-10-CM

## 2020-11-15 DIAGNOSIS — U07.1 COVID-19: ICD-10-CM

## 2020-11-15 LAB
ALBUMIN SERPL BCP-MCNC: 4.1 G/DL (ref 3.2–4.9)
ALBUMIN/GLOB SERPL: 1.3 G/DL
ALP SERPL-CCNC: 84 U/L (ref 30–99)
ALT SERPL-CCNC: 53 U/L (ref 2–50)
ANION GAP SERPL CALC-SCNC: 11 MMOL/L (ref 7–16)
AST SERPL-CCNC: 31 U/L (ref 12–45)
BASOPHILS # BLD AUTO: 0.2 % (ref 0–1.8)
BASOPHILS # BLD: 0.01 K/UL (ref 0–0.12)
BILIRUB SERPL-MCNC: 0.2 MG/DL (ref 0.1–1.5)
BUN SERPL-MCNC: 10 MG/DL (ref 8–22)
CALCIUM SERPL-MCNC: 9.2 MG/DL (ref 8.5–10.5)
CHLORIDE SERPL-SCNC: 103 MMOL/L (ref 96–112)
CO2 SERPL-SCNC: 23 MMOL/L (ref 20–33)
CREAT SERPL-MCNC: 0.62 MG/DL (ref 0.5–1.4)
EKG IMPRESSION: NORMAL
EOSINOPHIL # BLD AUTO: 0.02 K/UL (ref 0–0.51)
EOSINOPHIL NFR BLD: 0.4 % (ref 0–6.9)
ERYTHROCYTE [DISTWIDTH] IN BLOOD BY AUTOMATED COUNT: 40.2 FL (ref 35.9–50)
GLOBULIN SER CALC-MCNC: 3.1 G/DL (ref 1.9–3.5)
GLUCOSE SERPL-MCNC: 275 MG/DL (ref 65–99)
HCT VFR BLD AUTO: 40.9 % (ref 37–47)
HGB BLD-MCNC: 13.4 G/DL (ref 12–16)
IMM GRANULOCYTES # BLD AUTO: 0.01 K/UL (ref 0–0.11)
IMM GRANULOCYTES NFR BLD AUTO: 0.2 % (ref 0–0.9)
LYMPHOCYTES # BLD AUTO: 1.97 K/UL (ref 1–4.8)
LYMPHOCYTES NFR BLD: 38.6 % (ref 22–41)
MCH RBC QN AUTO: 27.8 PG (ref 27–33)
MCHC RBC AUTO-ENTMCNC: 32.8 G/DL (ref 33.6–35)
MCV RBC AUTO: 84.9 FL (ref 81.4–97.8)
MONOCYTES # BLD AUTO: 0.24 K/UL (ref 0–0.85)
MONOCYTES NFR BLD AUTO: 4.7 % (ref 0–13.4)
NEUTROPHILS # BLD AUTO: 2.85 K/UL (ref 2–7.15)
NEUTROPHILS NFR BLD: 55.9 % (ref 44–72)
NRBC # BLD AUTO: 0 K/UL
NRBC BLD-RTO: 0 /100 WBC
PLATELET # BLD AUTO: 163 K/UL (ref 164–446)
PMV BLD AUTO: 11.8 FL (ref 9–12.9)
POTASSIUM SERPL-SCNC: 4.1 MMOL/L (ref 3.6–5.5)
PROT SERPL-MCNC: 7.2 G/DL (ref 6–8.2)
RBC # BLD AUTO: 4.82 M/UL (ref 4.2–5.4)
SODIUM SERPL-SCNC: 137 MMOL/L (ref 135–145)
TROPONIN T SERPL-MCNC: <6 NG/L (ref 6–19)
WBC # BLD AUTO: 5.1 K/UL (ref 4.8–10.8)

## 2020-11-15 PROCEDURE — 80053 COMPREHEN METABOLIC PANEL: CPT

## 2020-11-15 PROCEDURE — 93005 ELECTROCARDIOGRAM TRACING: CPT

## 2020-11-15 PROCEDURE — 36415 COLL VENOUS BLD VENIPUNCTURE: CPT

## 2020-11-15 PROCEDURE — 84484 ASSAY OF TROPONIN QUANT: CPT

## 2020-11-15 PROCEDURE — 85025 COMPLETE CBC W/AUTO DIFF WBC: CPT

## 2020-11-15 PROCEDURE — 71045 X-RAY EXAM CHEST 1 VIEW: CPT

## 2020-11-15 PROCEDURE — 99283 EMERGENCY DEPT VISIT LOW MDM: CPT

## 2020-11-15 PROCEDURE — 93005 ELECTROCARDIOGRAM TRACING: CPT | Performed by: EMERGENCY MEDICINE

## 2020-11-15 RX ORDER — BENZONATATE 100 MG/1
100 CAPSULE ORAL 3 TIMES DAILY PRN
Qty: 30 CAP | Refills: 0 | Status: SHIPPED | OUTPATIENT
Start: 2020-11-15 | End: 2021-04-06

## 2020-11-15 ASSESSMENT — FIBROSIS 4 INDEX: FIB4 SCORE: 0.88

## 2020-11-16 NOTE — ED NOTES
Discharge teaching and paperwork provided. All questions/concerns answered. VSS, assessment stable. Given information regarding prescription. Patient discharged to the care of self and ambulated out of the ED with steady gait.

## 2020-11-16 NOTE — ED NOTES
Patient retrieved from [facility]. Patient ambulated to [facility] with steady gait. Patient agrees with triage note.    Placed onto monitor.

## 2020-11-16 NOTE — ED PROVIDER NOTES
ED Provider Note    Scribed for Kyle Samano M.D. by Kyle Samano M.D.. 11/15/2020,  9:30 PM.    CHIEF COMPLAINT  Chief Complaint   Patient presents with   • Shortness of Breath     Pt reports SOB and CP x 2 days. Pt tested covid positive on Wednesday. Also reporting fatigue.    • Chest Pain     Mid chest pain that radiates to the left shoulder, 8/10 constant pressure.        HPI  Tanya Barrow is a 45 y.o. female who presents to the Emergency Department for subjectively worsening symptoms associated with a positive Covid test on Wednesday of this past week.  She reports worsening shortness of breath and chest pressure for the last 2 days.  The chest pressure is centrally located.  It does occasionally radiate to the left shoulder.  She reports that it is constant.  She has not had diaphoresis or vomiting.  She reports generalized fatigue.  She does not have a history of known cardiovascular disease, though does have previous issues with chest pain.  On arrival, she is resting comfortably, with normal vital signs.  She has not had fevers or chills, abdominal pain, constipation or diarrhea or dysuria.  She reports some improvement in her shortness of breath symptoms from over-the-counter cans of inhaled oxygen.      REVIEW OF SYSTEMS  See HPI for further details. All other systems are negative.     PAST MEDICAL HISTORY   has a past medical history of Anemia, Blood clotting disorder ([facility]), Chest pain (02/2019), Diabetes, GERD (gastroesophageal reflux disease), Heart burn, Hemorrhagic disorder (HCC), Hyperlipidemia, Hypertension (2006), Obesity, Urinary bladder disorder, and Urinary incontinence.    SOCIAL HISTORY  Social History     Tobacco Use   • Smoking status: Never Smoker   • Smokeless tobacco: Never Used   Substance and Sexual Activity   • Alcohol use: No   • Drug use: No   • Sexual activity: Yes     Partners: Male     Social History     Substance and Sexual Activity   Drug Use No  "      SURGICAL HISTORY   has a past surgical history that includes other; bladder sling female (10/25/2016); bladder sling female (4/11/2017); bladder sling female (10/17/2017); cystoscopy (N/A, 10/17/2017); vaginal hysterectomy scope total (10/25/2016); salpingectomy (Bilateral, 10/25/2016); anterior and posterior repair (10/25/2016); enterocele repair (10/25/2016); vaginal suspension (10/25/2016); anterior and posterior repair (4/11/2017); enterocele repair (4/11/2017); vaginal suspension (4/11/2017); anterior and posterior repair (10/17/2017); enterocele repair (10/17/2017); vaginal suspension (10/17/2017); anterior and posterior repair (2/22/2018); enterocele repair (2/22/2018); vaginal suspension (N/A, 2/22/2018); and bladder sling female (N/A, 2/22/2018).    CURRENT MEDICATIONS  Home Medications     Reviewed by Gabriella Virgen R.N. (Registered Nurse) on 11/15/20 at 2020  Med List Status: Complete   Medication Last Dose Status   albuterol 108 (90 Base) MCG/ACT Aero Soln inhalation aerosol  Active   aspirin EC (ECOTRIN) 81 MG Tablet Delayed Response  Active   fluticasone (FLONASE) 50 MCG/ACT nasal spray  Active   Insulin Glargine (BASAGLAR KWIKPEN) 100 UNIT/ML Solution Pen-injector  Active   losartan (COZAAR) 25 MG Tab  Active   metformin (GLUCOPHAGE) 1000 MG tablet  Active   metoprolol SR (TOPROL XL) 50 MG TABLET SR 24 HR  Active   ONE TOUCH ULTRA TEST strip  Active   ONETOUCH DELICA LANCETS 33G Misc  Active   raNITidine (ZANTAC) 150 MG Tab  Active                ALLERGIES  Allergies   Allergen Reactions   • Latex      Condons, rash  Latex gloves, rash       PHYSICAL EXAM  VITAL SIGNS: BP (!) 162/82   Pulse 72   Temp 36.6 °C (97.8 °F) (Temporal)   Resp (!) 22   Ht 1.651 m (5' 5\")   Wt 99.8 kg (220 lb)   LMP 10/13/2016 (Exact Date)   SpO2 96%   BMI 36.61 kg/m²   Pulse ox interpretation: I interpret this pulse ox as normal.  Constitutional: Alert in no apparent distress.  HENT: No signs of trauma, " Bilateral external ears normal, Nose normal.   Eyes: Conjunctiva normal, Non-icteric.   Neck: Normal range of motion, Supple, No stridor.   Lymphatic: No lymphadenopathy noted.   Cardiovascular: Regular rate and rhythm, no murmurs.   Thorax & Lungs: Normal breath sounds, No respiratory distress, No wheezing, No chest tenderness.   Skin: Warm, Dry, No erythema, No rash.   Extremities: Intact distal pulses, No edema, No cyanosis.  Musculoskeletal: Good range of motion in all major joints. No or major deformities noted.   Neurologic: Alert , Normal motor function, Normal sensory function, No focal deficits noted.   Psychiatric: Affect normal, Judgment normal, Mood normal.     DIAGNOSTIC STUDIES / PROCEDURES    EKG  Interpreted by me    Results for orders placed or performed during the hospital encounter of 11/15/20   EKG (NOW)   Result Value Ref Range    Report       [facility] Emergency Dept.    Test Date:  2020-11-15  Pt Name:    ALIX CARMEN Department: ER  MRN:        [id number]                      Room:  Gender:     Female                       Technician: [id number]  :        [id number]                   Requested By:ER TRIAGE PROTOCOL  Order #:    [id number]                    Reading MD: MARY BETH WRIGHT MD    Measurements  Intervals                                Axis  Rate:       76                           P:          53  AK:         152                          QRS:        27  QRSD:       90                           T:          28  QT:         392  QTc:        441    Interpretive Statements  SINUS RHYTHM  Compared to ECG 2020 13:56:34  Myocardial infarct finding no longer present  Electronically Signed On 11- 21:30:40 PST by MARY BETH WRIGHT MD           LABS  Labs Reviewed   CBC WITH DIFFERENTIAL - Abnormal; Notable for the following components:       Result Value    MCHC 32.8 (*)     Platelet Count 163 (*)     All other components within normal limits    COMP METABOLIC PANEL - Abnormal; Notable for the following components:    Glucose 275 (*)     ALT(SGPT) 53 (*)     All other components within normal limits   TROPONIN   ESTIMATED GFR     All labs reviewed by me.    RADIOLOGY  DX-CHEST-PORTABLE (1 VIEW)   Final Result         1.  No acute cardiopulmonary disease.        The radiologist's interpretation of all radiological studies have been reviewed by me.    COURSE & MEDICAL DECISION MAKING  Nursing notes, VS, PMSFHx reviewed in chart.     9:30 PM Patient seen and examined at bedside.  Her labs, ordered at triage per protocol, are reassuring.  Her troponin is negative.  Her EKG is nonischemic.  Differential diagnosis includes but is not limited to COVID-19 symptoms, pulmonary infiltrates, unlikely ACS now that her troponin and EKG are normal.  We had a long talk at the bedside regarding the viral nature of COVID-19, and supportive care, without any option for a specific cure.  I did my best to reassure her that her symptoms are [place] and consistent with this virus.  Her chest x-ray is still pending.    9:58 PM this patient's chest x-ray is normal.  She will continue her home medications, which includes a daily aspirin, and I will prescribe her Tessalon Perles.  We also discussed that nighttime melatonin is a more recent treatment that shows promise for this virus, and there is no downside, especially as it may improve sleep, which is part of healing     The patient will return for new or worsening symptoms and is stable at the time of discharge.    The patient is referred to a primary physician for blood pressure management, diabetic screening, and for all other preventative health concerns.      DISPOSITION:  Patient will be discharged home in stable condition.    FOLLOW UP:  No follow-up provider specified.    OUTPATIENT MEDICATIONS:  New Prescriptions    BENZONATATE (TESSALON) 100 MG CAP    Take 1 Cap by mouth 3 times a day as needed for Cough.         FINAL  IMPRESSION  1. COVID-19    2. Shortness of breath

## 2020-11-16 NOTE — ED TRIAGE NOTES
"Tanya Barrow  45 y.o. female  Chief Complaint   Patient presents with   • Shortness of Breath     Pt reports SOB and CP x 2 days. Pt tested covid positive on Wednesday. Also reporting fatigue.    • Chest Pain     Mid chest pain that radiates to the left shoulder, 8/10 constant pressure.        BP (!) 184/103   Pulse 73   Temp 36.6 °C (97.8 °F) (Temporal)   Resp 16   Ht 1.651 m (5' 5\")   Wt 99.8 kg (220 lb)   SpO2 97%       "

## 2020-11-16 NOTE — DISCHARGE INSTRUCTIONS
Tus pruebas aquí fueron todas normales. [place] síntomas son normales para COVID-19. Asegúrese de usar dot máscara, distancia social y practique dot buena higiene de gurjit. Si latonia síntomas empeoran hasta el punto de que le falta el aire tanto que solo puede caminar distancias muy cortas antes de la fatiga o siente que no pudo controlar latonia síntomas en casa, regrese al departamento de emergencias. Para un enfoque más objetivo, puede comprar un oxímetro de pulso en línea. [facility] tiene múltiples para elegir. Si sutton saturación de oxígeno en estos dispositivos es persistentemente por debajo del 90%, regrese a la dom de emergencias.    COVID-19 es un virus. Los antibióticos no son útiles para los virus. El tratamiento para COVID-19 es dot aspirina diaria, melatonina por la noche, Tylenol o ibuprofeno para los howard y molestias y la fiebre, y cualquier otro medicamento que le resulte útil. Le hemos recetado un buen medicamento para la tos.    (Your tests here were all normal. Your symptoms are normal for COVID-19. Make sure you still wear a mask, social distance and practice good hand hygiene. If your symptoms worsen to a point that you become so short of breath that you can only walk very short distances prior to fatigue or feel you were unable to manage your symptoms at home please return to the emergency department. For a more objective approach you can buy a pulse oximeter online. [facility] has multiple to chose from. If your oxygen saturation in these devices is persistently below 90% please return to the ER.    COVID-19 is a virus.  Antibiotics are not helpful for viruses.  Treatment for COVID-19 is a daily aspirin, melatonin at night, Tylenol or ibuprofen for aches and pains and fevers, and any other medications that you find helpful.  We have prescribed you a good cough medication.)

## 2020-11-25 ENCOUNTER — HOSPITAL ENCOUNTER (OUTPATIENT)
Facility: MEDICAL CENTER | Age: 45
End: 2020-11-25
Attending: SURGERY | Admitting: SURGERY
Payer: COMMERCIAL

## 2020-11-25 NOTE — OR NURSING
Patient called PAT today to discuss positive covid result from 11/11/2020.  Patient reports that she was asymptomatic as of 11/11/2020 but symptoms began on 11/15/2020 which included cough and loss of sense of taste and smell.  Patient reports her symptoms resolved on 11/22/2020.  Patient has still not heard from the health department.  I encouraged pt to call the health department and to wait to hear from Dr. Copeland's office.  I spoke to Dr. Copeland's office and updated on the above information.  Dr. Copeladn's office aware that symptomatic patients should have elective surgery postponed for 21 days after symptom onset or 14 days after resolution.  Dr. Copeland's surgery scheduler will fax surgery scheduling with updated surgery date.

## 2020-12-02 ENCOUNTER — HOSPITAL ENCOUNTER (EMERGENCY)
Dept: HOSPITAL 8 - ED | Age: 45
Discharge: HOME | End: 2020-12-02
Payer: MEDICAID

## 2020-12-02 VITALS — DIASTOLIC BLOOD PRESSURE: 87 MMHG | SYSTOLIC BLOOD PRESSURE: 179 MMHG

## 2020-12-02 VITALS — HEIGHT: 65 IN | WEIGHT: 220.24 LBS | BODY MASS INDEX: 36.69 KG/M2

## 2020-12-02 DIAGNOSIS — R07.89: ICD-10-CM

## 2020-12-02 DIAGNOSIS — E11.9: ICD-10-CM

## 2020-12-02 DIAGNOSIS — E78.5: ICD-10-CM

## 2020-12-02 DIAGNOSIS — Z90.49: ICD-10-CM

## 2020-12-02 DIAGNOSIS — K21.9: Primary | ICD-10-CM

## 2020-12-02 DIAGNOSIS — I10: ICD-10-CM

## 2020-12-02 LAB
ALBUMIN SERPL-MCNC: 3.8 G/DL (ref 3.4–5)
ALP SERPL-CCNC: 83 U/L (ref 45–117)
ALT SERPL-CCNC: 46 U/L (ref 12–78)
ANION GAP SERPL CALC-SCNC: 7 MMOL/L (ref 5–15)
BASOPHILS # BLD AUTO: 0 X10^3/UL (ref 0–0.1)
BASOPHILS NFR BLD AUTO: 0 % (ref 0–1)
BILIRUB SERPL-MCNC: 0.2 MG/DL (ref 0.2–1)
CALCIUM SERPL-MCNC: 9 MG/DL (ref 8.5–10.1)
CHLORIDE SERPL-SCNC: 106 MMOL/L (ref 98–107)
CREAT SERPL-MCNC: 0.77 MG/DL (ref 0.55–1.02)
EOSINOPHIL # BLD AUTO: 0.1 X10^3/UL (ref 0–0.4)
EOSINOPHIL NFR BLD AUTO: 1 % (ref 1–7)
ERYTHROCYTE [DISTWIDTH] IN BLOOD BY AUTOMATED COUNT: 13.8 % (ref 9.6–15.2)
LYMPHOCYTES # BLD AUTO: 3 X10^3/UL (ref 1–3.4)
LYMPHOCYTES NFR BLD AUTO: 36 % (ref 22–44)
MCH RBC QN AUTO: 28.3 PG (ref 27–34.8)
MCHC RBC AUTO-ENTMCNC: 33.3 G/DL (ref 32.4–35.8)
MD: NO
MONOCYTES # BLD AUTO: 0.4 X10^3/UL (ref 0.2–0.8)
MONOCYTES NFR BLD AUTO: 5 % (ref 2–9)
NEUTROPHILS # BLD AUTO: 4.7 X10^3/UL (ref 1.8–6.8)
NEUTROPHILS NFR BLD AUTO: 57 % (ref 42–75)
PLATELET # BLD AUTO: 230 X10^3/UL (ref 130–400)
PMV BLD AUTO: 10.1 FL (ref 7.4–10.4)
PROT SERPL-MCNC: 8 G/DL (ref 6.4–8.2)
RBC # BLD AUTO: 4.64 X10^6/UL (ref 3.82–5.3)
TROPONIN I SERPL-MCNC: < 0.015 NG/ML (ref 0–0.04)

## 2020-12-02 PROCEDURE — 96372 THER/PROPH/DIAG INJ SC/IM: CPT

## 2020-12-02 PROCEDURE — 85025 COMPLETE CBC W/AUTO DIFF WBC: CPT

## 2020-12-02 PROCEDURE — 84484 ASSAY OF TROPONIN QUANT: CPT

## 2020-12-02 PROCEDURE — 93005 ELECTROCARDIOGRAM TRACING: CPT

## 2020-12-02 PROCEDURE — 80053 COMPREHEN METABOLIC PANEL: CPT

## 2020-12-02 PROCEDURE — 99285 EMERGENCY DEPT VISIT HI MDM: CPT

## 2020-12-02 PROCEDURE — 36415 COLL VENOUS BLD VENIPUNCTURE: CPT

## 2020-12-02 PROCEDURE — 71046 X-RAY EXAM CHEST 2 VIEWS: CPT

## 2020-12-04 ENCOUNTER — PRE-ADMISSION TESTING (OUTPATIENT)
Dept: ADMISSIONS | Facility: MEDICAL CENTER | Age: 45
End: 2020-12-04
Attending: SURGERY
Payer: COMMERCIAL

## 2020-12-04 RX ORDER — ATORVASTATIN CALCIUM 40 MG/1
40 TABLET, FILM COATED ORAL EVERY EVENING
COMMUNITY

## 2020-12-04 RX ORDER — FAMOTIDINE 20 MG/1
TABLET, FILM COATED ORAL
COMMUNITY
Start: 2020-10-13 | End: 2021-10-29

## 2020-12-04 RX ORDER — SUCRALFATE 1 G/1
TABLET ORAL
COMMUNITY
Start: 2020-12-03 | End: 2021-02-16

## 2020-12-04 RX ORDER — OMEPRAZOLE 20 MG/1
20 CAPSULE, DELAYED RELEASE ORAL DAILY
COMMUNITY
Start: 2020-12-03 | End: 2021-10-29

## 2020-12-04 RX ORDER — NITROFURANTOIN 25; 75 MG/1; MG/1
CAPSULE ORAL
COMMUNITY
Start: 2020-10-13 | End: 2021-02-16

## 2020-12-04 RX ORDER — LOSARTAN POTASSIUM 50 MG/1
50 TABLET ORAL DAILY
COMMUNITY
Start: 2020-11-11 | End: 2021-02-16 | Stop reason: SDUPTHER

## 2020-12-04 RX ORDER — PREDNISONE 20 MG/1
TABLET ORAL
COMMUNITY
Start: 2020-12-02 | End: 2021-02-16

## 2020-12-04 ASSESSMENT — FIBROSIS 4 INDEX: FIB4 SCORE: 1.23

## 2020-12-04 NOTE — OR NURSING
"Pre-admit appointment completed. \"Preparing for your Procedure\" sheet given to Pt with verbal and written instructions. Pt states all instructions given are understood and to call pre-admit or Dr's office for additional questions or any symptoms of illness/covid develop prior to DOS. Self isolation instructions for after the Covid test given to patient. Medications the patient will take the morning of surgery per anesthesia protocol:  Omeprazole, sucralfate    Denies anesthesia complications  No covid test needed. Pt tested positive last month.     "

## 2020-12-09 ENCOUNTER — ANESTHESIA (OUTPATIENT)
Dept: SURGERY | Facility: MEDICAL CENTER | Age: 45
End: 2020-12-09
Payer: COMMERCIAL

## 2020-12-09 ENCOUNTER — HOSPITAL ENCOUNTER (OUTPATIENT)
Facility: MEDICAL CENTER | Age: 45
End: 2020-12-09
Attending: SURGERY | Admitting: SURGERY
Payer: COMMERCIAL

## 2020-12-09 ENCOUNTER — ANESTHESIA EVENT (OUTPATIENT)
Dept: SURGERY | Facility: MEDICAL CENTER | Age: 45
End: 2020-12-09
Payer: COMMERCIAL

## 2020-12-09 VITALS
BODY MASS INDEX: 36.14 KG/M2 | DIASTOLIC BLOOD PRESSURE: 93 MMHG | WEIGHT: 216.93 LBS | SYSTOLIC BLOOD PRESSURE: 174 MMHG | HEART RATE: 79 BPM | OXYGEN SATURATION: 92 % | HEIGHT: 65 IN | TEMPERATURE: 98.1 F | RESPIRATION RATE: 14 BRPM

## 2020-12-09 DIAGNOSIS — G89.18 POST-OPERATIVE PAIN: ICD-10-CM

## 2020-12-09 LAB
GLUCOSE BLD-MCNC: 89 MG/DL (ref 65–99)
PATHOLOGY CONSULT NOTE: NORMAL

## 2020-12-09 PROCEDURE — 160031 HCHG SURGERY MINUTES - 1ST 30 MINS LEVEL 5: Performed by: SURGERY

## 2020-12-09 PROCEDURE — 160042 HCHG SURGERY MINUTES - EA ADDL 1 MIN LEVEL 5: Performed by: SURGERY

## 2020-12-09 PROCEDURE — 700101 HCHG RX REV CODE 250: Performed by: ANESTHESIOLOGY

## 2020-12-09 PROCEDURE — 700111 HCHG RX REV CODE 636 W/ 250 OVERRIDE (IP): Performed by: ANESTHESIOLOGY

## 2020-12-09 PROCEDURE — 700102 HCHG RX REV CODE 250 W/ 637 OVERRIDE(OP): Performed by: ANESTHESIOLOGY

## 2020-12-09 PROCEDURE — 88304 TISSUE EXAM BY PATHOLOGIST: CPT

## 2020-12-09 PROCEDURE — 160002 HCHG RECOVERY MINUTES (STAT): Performed by: SURGERY

## 2020-12-09 PROCEDURE — 160035 HCHG PACU - 1ST 60 MINS PHASE I: Performed by: SURGERY

## 2020-12-09 PROCEDURE — 502240 HCHG MISC OR SUPPLY RC 0272: Performed by: SURGERY

## 2020-12-09 PROCEDURE — 501838 HCHG SUTURE GENERAL: Performed by: SURGERY

## 2020-12-09 PROCEDURE — 82962 GLUCOSE BLOOD TEST: CPT

## 2020-12-09 PROCEDURE — 502714 HCHG ROBOTIC SURGERY SERVICES: Performed by: SURGERY

## 2020-12-09 PROCEDURE — 160046 HCHG PACU - 1ST 60 MINS PHASE II: Performed by: SURGERY

## 2020-12-09 PROCEDURE — 160009 HCHG ANES TIME/MIN: Performed by: SURGERY

## 2020-12-09 PROCEDURE — 500002 HCHG ADHESIVE, DERMABOND: Performed by: SURGERY

## 2020-12-09 PROCEDURE — 160036 HCHG PACU - EA ADDL 30 MINS PHASE I: Performed by: SURGERY

## 2020-12-09 PROCEDURE — 160025 RECOVERY II MINUTES (STATS): Performed by: SURGERY

## 2020-12-09 PROCEDURE — 160048 HCHG OR STATISTICAL LEVEL 1-5: Performed by: SURGERY

## 2020-12-09 PROCEDURE — A9270 NON-COVERED ITEM OR SERVICE: HCPCS | Performed by: ANESTHESIOLOGY

## 2020-12-09 PROCEDURE — 700101 HCHG RX REV CODE 250: Performed by: SURGERY

## 2020-12-09 PROCEDURE — 700105 HCHG RX REV CODE 258: Performed by: SURGERY

## 2020-12-09 RX ORDER — LIDOCAINE HYDROCHLORIDE 20 MG/ML
INJECTION, SOLUTION EPIDURAL; INFILTRATION; INTRACAUDAL; PERINEURAL PRN
Status: DISCONTINUED | OUTPATIENT
Start: 2020-12-09 | End: 2020-12-09 | Stop reason: HOSPADM

## 2020-12-09 RX ORDER — CELECOXIB 200 MG/1
200 CAPSULE ORAL ONCE
Status: COMPLETED | OUTPATIENT
Start: 2020-12-09 | End: 2020-12-09

## 2020-12-09 RX ORDER — ROCURONIUM BROMIDE 10 MG/ML
INJECTION, SOLUTION INTRAVENOUS PRN
Status: DISCONTINUED | OUTPATIENT
Start: 2020-12-09 | End: 2020-12-09 | Stop reason: HOSPADM

## 2020-12-09 RX ORDER — SODIUM CHLORIDE, SODIUM LACTATE, POTASSIUM CHLORIDE, CALCIUM CHLORIDE 600; 310; 30; 20 MG/100ML; MG/100ML; MG/100ML; MG/100ML
INJECTION, SOLUTION INTRAVENOUS CONTINUOUS
Status: DISCONTINUED | OUTPATIENT
Start: 2020-12-09 | End: 2020-12-09 | Stop reason: HOSPADM

## 2020-12-09 RX ORDER — HYDROMORPHONE HYDROCHLORIDE 1 MG/ML
0.1 INJECTION, SOLUTION INTRAMUSCULAR; INTRAVENOUS; SUBCUTANEOUS
Status: DISCONTINUED | OUTPATIENT
Start: 2020-12-09 | End: 2020-12-09 | Stop reason: HOSPADM

## 2020-12-09 RX ORDER — OXYCODONE HCL 5 MG/5 ML
10 SOLUTION, ORAL ORAL
Status: COMPLETED | OUTPATIENT
Start: 2020-12-09 | End: 2020-12-09

## 2020-12-09 RX ORDER — HYDROMORPHONE HYDROCHLORIDE 1 MG/ML
0.2 INJECTION, SOLUTION INTRAMUSCULAR; INTRAVENOUS; SUBCUTANEOUS
Status: DISCONTINUED | OUTPATIENT
Start: 2020-12-09 | End: 2020-12-09 | Stop reason: HOSPADM

## 2020-12-09 RX ORDER — ONDANSETRON 2 MG/ML
4 INJECTION INTRAMUSCULAR; INTRAVENOUS
Status: DISCONTINUED | OUTPATIENT
Start: 2020-12-09 | End: 2020-12-09 | Stop reason: HOSPADM

## 2020-12-09 RX ORDER — OXYCODONE HCL 5 MG/5 ML
5 SOLUTION, ORAL ORAL
Status: COMPLETED | OUTPATIENT
Start: 2020-12-09 | End: 2020-12-09

## 2020-12-09 RX ORDER — LORAZEPAM 2 MG/ML
0.5 INJECTION INTRAMUSCULAR
Status: DISCONTINUED | OUTPATIENT
Start: 2020-12-09 | End: 2020-12-09 | Stop reason: HOSPADM

## 2020-12-09 RX ORDER — HYDROMORPHONE HYDROCHLORIDE 1 MG/ML
0.4 INJECTION, SOLUTION INTRAMUSCULAR; INTRAVENOUS; SUBCUTANEOUS
Status: DISCONTINUED | OUTPATIENT
Start: 2020-12-09 | End: 2020-12-09 | Stop reason: HOSPADM

## 2020-12-09 RX ORDER — ONDANSETRON 4 MG/1
4 TABLET, FILM COATED ORAL EVERY 4 HOURS PRN
Qty: 5 TAB | Refills: 1 | Status: SHIPPED | OUTPATIENT
Start: 2020-12-09 | End: 2020-12-11

## 2020-12-09 RX ORDER — ACETAMINOPHEN 500 MG
1000 TABLET ORAL ONCE
Status: COMPLETED | OUTPATIENT
Start: 2020-12-09 | End: 2020-12-09

## 2020-12-09 RX ORDER — LABETALOL HYDROCHLORIDE 5 MG/ML
5 INJECTION, SOLUTION INTRAVENOUS
Status: COMPLETED | OUTPATIENT
Start: 2020-12-09 | End: 2020-12-09

## 2020-12-09 RX ORDER — DIPHENHYDRAMINE HYDROCHLORIDE 50 MG/ML
12.5 INJECTION INTRAMUSCULAR; INTRAVENOUS
Status: DISCONTINUED | OUTPATIENT
Start: 2020-12-09 | End: 2020-12-09 | Stop reason: HOSPADM

## 2020-12-09 RX ORDER — OXYCODONE HYDROCHLORIDE AND ACETAMINOPHEN 5; 325 MG/1; MG/1
1 TABLET ORAL EVERY 4 HOURS PRN
Qty: 24 TAB | Refills: 0 | Status: SHIPPED | OUTPATIENT
Start: 2020-12-09 | End: 2020-12-13

## 2020-12-09 RX ORDER — CEFAZOLIN SODIUM 1 G/3ML
INJECTION, POWDER, FOR SOLUTION INTRAMUSCULAR; INTRAVENOUS PRN
Status: DISCONTINUED | OUTPATIENT
Start: 2020-12-09 | End: 2020-12-09 | Stop reason: SURG

## 2020-12-09 RX ORDER — MIDAZOLAM HYDROCHLORIDE 1 MG/ML
INJECTION INTRAMUSCULAR; INTRAVENOUS PRN
Status: DISCONTINUED | OUTPATIENT
Start: 2020-12-09 | End: 2020-12-09 | Stop reason: SURG

## 2020-12-09 RX ORDER — HYDRALAZINE HYDROCHLORIDE 20 MG/ML
5 INJECTION INTRAMUSCULAR; INTRAVENOUS
Status: DISCONTINUED | OUTPATIENT
Start: 2020-12-09 | End: 2020-12-09 | Stop reason: HOSPADM

## 2020-12-09 RX ORDER — BUPIVACAINE HYDROCHLORIDE AND EPINEPHRINE 5; 5 MG/ML; UG/ML
INJECTION, SOLUTION EPIDURAL; INTRACAUDAL; PERINEURAL
Status: DISCONTINUED | OUTPATIENT
Start: 2020-12-09 | End: 2020-12-09 | Stop reason: HOSPADM

## 2020-12-09 RX ORDER — HALOPERIDOL 5 MG/ML
1 INJECTION INTRAMUSCULAR
Status: DISCONTINUED | OUTPATIENT
Start: 2020-12-09 | End: 2020-12-09 | Stop reason: HOSPADM

## 2020-12-09 RX ORDER — MEPERIDINE HYDROCHLORIDE 25 MG/ML
6.25 INJECTION INTRAMUSCULAR; INTRAVENOUS; SUBCUTANEOUS
Status: DISCONTINUED | OUTPATIENT
Start: 2020-12-09 | End: 2020-12-09 | Stop reason: HOSPADM

## 2020-12-09 RX ORDER — DEXAMETHASONE SODIUM PHOSPHATE 4 MG/ML
INJECTION, SOLUTION INTRA-ARTICULAR; INTRALESIONAL; INTRAMUSCULAR; INTRAVENOUS; SOFT TISSUE PRN
Status: DISCONTINUED | OUTPATIENT
Start: 2020-12-09 | End: 2020-12-09 | Stop reason: SURG

## 2020-12-09 RX ADMIN — SUGAMMADEX 200 MG: 100 INJECTION, SOLUTION INTRAVENOUS at 16:28

## 2020-12-09 RX ADMIN — ACETAMINOPHEN 1000 MG: 500 TABLET ORAL at 14:07

## 2020-12-09 RX ADMIN — LABETALOL HYDROCHLORIDE 5 MG: 5 INJECTION, SOLUTION INTRAVENOUS at 16:43

## 2020-12-09 RX ADMIN — FENTANYL CITRATE 25 MCG: 50 INJECTION, SOLUTION INTRAMUSCULAR; INTRAVENOUS at 17:06

## 2020-12-09 RX ADMIN — LABETALOL HYDROCHLORIDE 5 MG: 5 INJECTION, SOLUTION INTRAVENOUS at 16:55

## 2020-12-09 RX ADMIN — FENTANYL CITRATE 25 MCG: 50 INJECTION, SOLUTION INTRAMUSCULAR; INTRAVENOUS at 17:13

## 2020-12-09 RX ADMIN — SODIUM CHLORIDE, POTASSIUM CHLORIDE, SODIUM LACTATE AND CALCIUM CHLORIDE: 600; 310; 30; 20 INJECTION, SOLUTION INTRAVENOUS at 13:56

## 2020-12-09 RX ADMIN — FENTANYL CITRATE 100 MCG: 50 INJECTION, SOLUTION INTRAMUSCULAR; INTRAVENOUS at 15:56

## 2020-12-09 RX ADMIN — PROPOFOL 200 MG: 10 INJECTION, EMULSION INTRAVENOUS at 15:28

## 2020-12-09 RX ADMIN — MIDAZOLAM HYDROCHLORIDE 2 MG: 1 INJECTION, SOLUTION INTRAMUSCULAR; INTRAVENOUS at 15:24

## 2020-12-09 RX ADMIN — LABETALOL HYDROCHLORIDE 5 MG: 5 INJECTION, SOLUTION INTRAVENOUS at 17:07

## 2020-12-09 RX ADMIN — FENTANYL CITRATE 100 MCG: 50 INJECTION, SOLUTION INTRAMUSCULAR; INTRAVENOUS at 15:28

## 2020-12-09 RX ADMIN — CELECOXIB 200 MG: 200 CAPSULE ORAL at 14:07

## 2020-12-09 RX ADMIN — ROCURONIUM BROMIDE 50 MG: 10 INJECTION, SOLUTION INTRAVENOUS at 15:28

## 2020-12-09 RX ADMIN — LIDOCAINE HYDROCHLORIDE 60 MG: 20 INJECTION, SOLUTION EPIDURAL; INFILTRATION; INTRACAUDAL; PERINEURAL at 15:28

## 2020-12-09 RX ADMIN — OXYCODONE HYDROCHLORIDE 10 MG: 5 SOLUTION ORAL at 16:47

## 2020-12-09 RX ADMIN — LABETALOL HYDROCHLORIDE 5 MG: 5 INJECTION, SOLUTION INTRAVENOUS at 16:49

## 2020-12-09 RX ADMIN — FENTANYL CITRATE 50 MCG: 50 INJECTION, SOLUTION INTRAMUSCULAR; INTRAVENOUS at 16:51

## 2020-12-09 RX ADMIN — CEFAZOLIN 2 G: 1 INJECTION, POWDER, FOR SOLUTION INTRAVENOUS at 15:30

## 2020-12-09 RX ADMIN — WATER 15 ML: 100 IRRIGANT IRRIGATION at 13:55

## 2020-12-09 RX ADMIN — DEXAMETHASONE SODIUM PHOSPHATE 4 MG: 4 INJECTION, SOLUTION INTRAMUSCULAR; INTRAVENOUS at 15:34

## 2020-12-09 ASSESSMENT — PAIN DESCRIPTION - PAIN TYPE
TYPE: SURGICAL PAIN
TYPE: ACUTE PAIN
TYPE: SURGICAL PAIN

## 2020-12-09 ASSESSMENT — FIBROSIS 4 INDEX: FIB4 SCORE: 1.23

## 2020-12-09 ASSESSMENT — PAIN SCALES - GENERAL: PAIN_LEVEL: 6

## 2020-12-09 NOTE — ANESTHESIA PROCEDURE NOTES
Airway    Date/Time: 12/9/2020 3:29 PM  Performed by: Nel Stratton M.D.  Authorized by: Nel Stratton M.D.     Location:  OR  Urgency:  Elective  Difficult Airway: No    Indications for Airway Management:  Anesthesia      Spontaneous Ventilation: absent    Sedation Level:  Deep  Preoxygenated: Yes    Patient Position:  Sniffing  Mask Difficulty Assessment:  1 - vent by mask  Final Airway Type:  Endotracheal airway  Final Endotracheal Airway:  ETT  Cuffed: Yes    Technique Used for Successful ETT Placement:  Direct laryngoscopy    Insertion Site:  Oral  Blade Type:  Donato  Laryngoscope Blade/Videolaryngoscope Blade Size:  3  ETT Size (mm):  7.0  Measured from:  Teeth  ETT to Teeth (cm):  21  Placement Verified by: auscultation and capnometry    Cormack-Lehane Classification:  Grade I - full view of glottis  Number of Attempts at Approach:  1  Number of Other Approaches Attempted:  0

## 2020-12-09 NOTE — ANESTHESIA PREPROCEDURE EVALUATION
46yo female with HTN, NIDDM now for cholecystectomy    Relevant Problems   CARDIAC   (+) Essential hypertension, benign      ENDO   (+) Type 2 diabetes mellitus without complication, with long-term current use of insulin (HCC)       Physical Exam    Airway   Mallampati: I  TM distance: >3 FB  Neck ROM: full       Cardiovascular - normal exam  Rhythm: regular  Rate: normal  (-) murmur     Dental - normal exam           Pulmonary - normal exam  Breath sounds clear to auscultation     Abdominal - normal exam     Neurological - normal exam                 Anesthesia Plan    ASA 3   ASA physical status 3 criteria: diabetes - poorly controlled and hypertension - poorly controlled    Plan - general       Airway plan will be ETT        Induction: intravenous    Postoperative Plan: Postoperative administration of opioids is intended.    Pertinent diagnostic labs and testing reviewed    Informed Consent:    Anesthetic plan and risks discussed with patient.    Use of blood products discussed with: patient whom consented to blood products.

## 2020-12-10 NOTE — OR NURSING
1850 Instructions given.  Patient and daughter verbalize understanding.  Meets discharge criteria.

## 2020-12-10 NOTE — OP REPORT
DATE OF SERVICE:  12/09/2020     SURGEON:  Nicho Copeland MD     ASSISTANT:  Nhi Deras MD     PREOPERATIVE DIAGNOSIS:  Chronic cholecystitis.     POSTOPERATIVE DIAGNOSIS:  Chronic cholecystitis.     PROCEDURE PERFORMED:  Robotic-assisted laparoscopic cholecystectomy.     ANESTHESIA:  General endotracheal anesthesia.     ANESTHESIOLOGIST:  Nel Stratton MD     INDICATIONS:  A 45-year-old woman with symptomatic gallstones and chronic   cholecystitis.  Cholecystectomy was indicated.  The procedure was discussed in   detail with the patient including the use of the surgical robot, potential   converting to an open procedure, associated risk of bleeding, infection,   abscess, and hematoma.  I also discussed risk of postoperative bile leak,   common duct stricture, common duct transection and significant complications.    She understood all the above and wished to proceed.     DESCRIPTION OF PROCEDURE:  She was placed under anesthesia by Dr. Stratton.    Abdomen was prepped and draped with chlorhexidine prep and sterile drapes.    After a timeout, a supraumbilical incision slightly off to the midline on the   left was made.  Through this incision, a Veress needle was placed and low   pressure was confirmed.  CO2 inflation was used to achieve a pneumoperitoneum   of 15 mmHg.  Through this same incision, 8 mm da Flash port was placed.  Under   direct visualization, a left-sided lateral da Flash port and two right-sided   ports were placed essentially on the same line above the umbilicus.  The robot   was docked and instruments were placed.  Gallbladder was retracted superiorly   and laterally.  Adhesions were taken down.  The base of the gallbladder was   carefully dissected.  The cystic duct was identified and could be seen to   clearly exit the gallbladder and track laterally along the gallbladder.  In   similar fashion, cystic artery was identified and dissected away from   surrounding connective tissue.   A critical view was obtained and subsequent to   this, two clips were placed proximally and one distally on the duct and it   was divided sharply.  Similarly, the cystic artery was clipped twice   proximally and divided distally with cautery.  Gallbladder was then dissected   off the liver bed and placed in _____ retrieval device and removed.    Hemostasis was assured with cautery.  Peritoneal cavity was irrigated   copiously.  There was no evidence of any bleeding or bile leak.  Ports were   removed.  Pneumoperitoneum was released.  The skin and all incisions were   approximated with 4-0 Vicryl sutures.  Dermabond was placed.  The patient   tolerated the procedure well without apparent complication.  Final counts were   reported as correct.     WOUND CLASS:  Class 3.        ______________________________________________  MD LAYLA SHERIDAN/KENN/RADHA    DD:  12/09/2020 16:34  DT:  12/09/2020 17:34    Job#:  [id number]

## 2020-12-10 NOTE — OR NURSING
1634: To PACU post robot assisted lap karla. Pt is extubated, breathing is spontaneous and unlabored. BP elevated, see MAR.  1717: Pain is tolerable, no nausea. BP is within parameters at this time. Report given to GREGORIO Garcia RN.

## 2020-12-10 NOTE — OR SURGEON
Immediate Post OP Note    PreOp Diagnosis: chronic cholecystitis    PostOp Diagnosis: chronic cholecystitis    Procedure(s):  CHOLECYSTECTOMY, ROBOT-ASSISTED, USING DA ROSA XI - Wound Class: Clean Contaminated    Surgeon(s):  CHARI Barriga M.D.    Anesthesiologist/Type of Anesthesia:  Anesthesiologist: Nel Stratton M.D./General    Surgical Staff:  Circulator: Hazel Avila R.N.  Scrub Person: Dean Gentile    Specimens removed if any:  ID Type Source Tests Collected by Time Destination   A : Gallbladder Tissue Gallbladder PATHOLOGY SPECIMEN Nicoh Copeland M.D. 12/9/2020  4:00 PM        Estimated Blood Loss: 25 cc    Findings: stones and adhesions    Complications: none        12/9/2020 4:29 PM Nicho Copeland M.D.

## 2020-12-10 NOTE — ANESTHESIA TIME REPORT
Anesthesia Start and Stop Event Times     Date Time Event    12/9/2020 1456 Ready for Procedure     1524 Anesthesia Start     1636 Anesthesia Stop        Responsible Staff  12/09/20    Name Role Begin End    Nel Stratton M.D. Anesth 1524 1636        Preop Diagnosis (Free Text):  Pre-op Diagnosis     CHOLELITHIASIS WITH CHRONIC CHOLECYSTITIS WITHOUT OBSTRUCTION        Preop Diagnosis (Codes):    Post op Diagnosis  Cholelithiasis      Premium Reason  A. 3PM - 7AM    Comments:

## 2020-12-10 NOTE — ANESTHESIA POSTPROCEDURE EVALUATION
Patient: Tanya Barrow    Procedure Summary     Date: 12/09/20 Room / Location:  OR  / SURGERY [facility]    Anesthesia Start: 1524 Anesthesia Stop: 1636    Procedure: CHOLECYSTECTOMY, ROBOT-ASSISTED, USING DA ROSA XI (Abdomen) Diagnosis: (CHOLELITHIASIS WITH CHRONIC CHOLECYSTITIS WITHOUT OBSTRUCTION)    Surgeons: Nicho Copeland M.D. Responsible Provider: Nel Stratton M.D.    Anesthesia Type: general ASA Status: 3          Final Anesthesia Type: general  Last vitals  BP   Blood Pressure: (!) 171/97    Temp   37.1 °C (98.8 °F)    Pulse   Pulse: 78   Resp   20    SpO2   98 %      Anesthesia Post Evaluation    Patient location during evaluation: PACU  Patient participation: complete - patient participated  Level of consciousness: awake and alert  Pain score: 6    Airway patency: patent  Anesthetic complications: no  Cardiovascular status: hemodynamically stable  Respiratory status: acceptable  Hydration status: euvolemic    PONV: none           Nurse Pain Score: 8 (NPRS)

## 2020-12-10 NOTE — OR NURSING
1720: Rcvd report from JAZMIN Enrique and assumed care, pt resting comfortably in the gurney, pain is tolerable, no nausea, sleepy, but arouses to voice. BP is coming down still. Dressings are CDI.  1730: BP remains within normal range, pain also remains tolerable, no nausea, pt still sleepy, but arouses spontaneously. Tolerating decrease of supplemental O2.  1745: Pt is still comfortably, states that she is having some gas pain and some shoulder pain. Educated pt about the referred pain from the surgery. BP still normal, tolerating further decrease of supplemental O2.  1755: Pt's pain remains tolerable, no nausea, more awake and alert, sitting up in the gurney more. Pt encourged to deep breath and cough and educated on how to use Incentive Spirometer. Pt able to pull 1500 ml on IS, goal is 2300 ml. Will keep working on the use of it. Pt's O2 saturations on room air will fall to mid 80s which sleep, but increase back to the mid 90s when she takes a deep breath.  1810: Pain remains tolerable, no nausea, tolerating room air, pt encouraged to use IS.  1825: Pain remains tolerable, no nausea, tolerating room air, sites all CDI. Report called to JAZMIN Rehman and pt readied for transfer.

## 2020-12-10 NOTE — DISCHARGE INSTRUCTIONS
ACTIVITY: Rest and take it easy for the first 24 hours.  A responsible adult is recommended to remain with you during that time.  It is normal to feel sleepy.  We encourage you to not do anything that requires balance, judgment or coordination.    MILD FLU-LIKE SYMPTOMS ARE NORMAL. YOU MAY EXPERIENCE GENERALIZED MUSCLE ACHES, THROAT IRRITATION, HEADACHE AND/OR SOME NAUSEA.    FOR 24 HOURS DO NOT:  Drive, operate machinery or run household appliances.  Drink beer or alcoholic beverages.   Make important decisions or sign legal documents.    SPECIAL PATIENT INSTRUCTIONS  GALL BLADDER SURGERY  (CHOLECYSTECTOMY)    FOLLOW-UP:  Please make an appointment with your physician in [phone] within 2week(s).    Call your physician immediately if you have any fevers greater than 101.   Call your physician if :Drainage from your wound that is not clear or looks infected, persistent bleeding, increasing abdominal pain, problems urinating, or persistent nausea/vomiting.    You should be aware that you may have right shoulder pain after surgery and that this will progressively go away.  This is called 'referred pain' and is from the area of the gallbladder.  It can also be caused by gas that may be trapped under the diaphragm from the surgery, especially if it was performed laparoscopically through mini-incisions.  This gas will progressively get reabsorbed by your body.     WOUND CARE INSTRUCTIONS:  Keep a wound sites clean, dry, and open to air, do not cover them with dressings.    If clothing rubs against the wound it can causes irritation and possible break the glue open.Try to keep clothing from rubbing on the sites.  Avoid ointments on the wound.    If the wounds becomes bright red and painful or start to drain infected material that is not clear, please contact your physician immediately.     You should also call if you begin to drain fluid that is thin and greenish-brown from the wound and appears to look like bile.    If  the wound though is mildly pink and has a thick firm ridge underneath it, this is normal, and is referred to as a healing ridge. This will resolve over the next 4-6 weeks.    DIET:  You may eat any foods that you can tolerate.  It is a good idea to eat a high fiber diet and take in plenty of fluids to prevent constipation.  If you do become constipated you may want to take a mild laxative or take ducolax tablets on a daily basis until your bowel habits are regular.  Constipation can be very uncomfortable, along with straining, after recent abdominal surgery.    ACTIVITY:  You are encouraged to cough and deep breath or use your incentive spirometer if you were given one, every 15-30 minutes when awake.  This will help prevent respiratory complications and low grade fevers post-operatively.  You may want to hug a pillow when coughing and sneezing to add additional support to the surgical area(s) which will decrease pain during these times.  You are encouraged to walk and engage in light activity for the next two weeks.  You should not lift more than 20 pounds during this time frame as it could put you at increased risk for a post-operative hernia.  Twenty pounds is roughly equivalent to a plastic bag of groceries.      MEDICATIONS:  Try to take narcotic medications and anti-inflammatory medications, such as tylenol, ibuprofen, naprosyn, etc., with food.  This will minimize stomach upset from the medication.  Should you develop nausea and vomiting from the pain medication, or develop a rash, please discontinue the medication and contact your physician.  You should not drive, make important decisions, or operate machinery when taking narcotic pain medication.    QUESTIONS:  Please feel free to call your physician or the hospital  if you have any questions, and they will be glad to assist you.    FOLLOW-UP APPOINTMENT:  A follow-up appointment should be arranged with your doctor in; call to schedule.    You  should CALL YOUR PHYSICIAN if you develop:  Fever greater than 101 degrees F.  Pain not relieved by medication, or persistent nausea or vomiting.  Excessive bleeding (blood soaking through dressing) or unexpected drainage from the wound.  Extreme redness or swelling around the incision site, drainage of pus or foul smelling drainage.  Inability to urinate or empty your bladder within 8 hours.  Problems with breathing or chest pain.    You should call 911 if you develop problems with breathing or chest pain.  If you are unable to contact your doctor or surgical center, you should go to the nearest emergency room or urgent care center.      Dr Copeland's telephone #: [phone]     If any questions arise, call your doctor.  If your doctor is not available, please feel free to call the Surgical Center at [phone]. The Contact Center is open Monday through Friday 7AM to 5PM and may speak to a nurse at [phone], or toll free at [phone].     A registered nurse may call you a few days after your surgery to see how you are doing after your procedure.    MEDICATIONS: Resume taking daily medication.  Take prescribed pain medication with food.  If no medication is prescribed, you may take non-aspirin pain medication if needed.  PAIN MEDICATION CAN BE VERY CONSTIPATING.  Take a stool softener or laxative such as senokot, pericolace, or milk of magnesia if needed.    Prescription given for Percocet 5-325 mg and Ondansetron 4 mg.  Last pain medication given at 4:47 PM    If your physician has prescribed pain medication that includes Acetaminophen (Tylenol), do not take additional Acetaminophen (Tylenol) while taking the prescribed medication.    Depression / Suicide Risk    As you are discharged from this [facility] facility, it is important to learn how to keep safe from harming yourself.    Recognize the warning signs:  · Abrupt changes in personality, positive or negative- including increase in energy   · Giving  away possessions  · Change in eating patterns- significant weight changes-  positive or negative  · Change in sleeping patterns- unable to sleep or sleeping all the time   · Unwillingness or inability to communicate  · Depression  · Unusual sadness, discouragement and loneliness  · Talk of wanting to die  · Neglect of personal appearance   · Rebelliousness- reckless behavior  · Withdrawal from people/activities they love  · Confusion- inability to concentrate     If you or a loved one observes any of these behaviors or has concerns about self-harm, here's what you can do:  · Talk about it- your feelings and reasons for harming yourself  · Remove any means that you might use to hurt yourself (examples: pills, rope, extension cords, firearm)  · Get professional help from the community (Mental Health, Substance Abuse, psychological counseling)  · Do not be alone:Call your Safe Contact- someone whom you trust who will be there for you.  · Call your local CRISIS HOTLINE 229-0724 or 726-437-1712  · Call your local Children's Mobile Crisis Response Team [place] (099) 668-3249 or www.[employer]  · Call the toll free National Suicide Prevention Hotlines   · National Suicide Prevention Lifeline 594-100-XXCJ (1612)  · National Hope Line Network 800-SUICIDE (972-2640)

## 2021-02-16 ENCOUNTER — OFFICE VISIT (OUTPATIENT)
Dept: CARDIOLOGY | Facility: MEDICAL CENTER | Age: 46
End: 2021-02-16
Payer: COMMERCIAL

## 2021-02-16 VITALS
DIASTOLIC BLOOD PRESSURE: 86 MMHG | SYSTOLIC BLOOD PRESSURE: 152 MMHG | HEIGHT: 64 IN | HEART RATE: 77 BPM | WEIGHT: 215 LBS | OXYGEN SATURATION: 96 % | BODY MASS INDEX: 36.7 KG/M2

## 2021-02-16 DIAGNOSIS — E78.2 MIXED HYPERLIPIDEMIA: ICD-10-CM

## 2021-02-16 DIAGNOSIS — E11.9 TYPE 2 DIABETES MELLITUS WITHOUT COMPLICATION, WITH LONG-TERM CURRENT USE OF INSULIN (HCC): ICD-10-CM

## 2021-02-16 DIAGNOSIS — I10 ESSENTIAL HYPERTENSION, BENIGN: ICD-10-CM

## 2021-02-16 DIAGNOSIS — R00.2 PALPITATIONS: ICD-10-CM

## 2021-02-16 DIAGNOSIS — Z79.4 TYPE 2 DIABETES MELLITUS WITHOUT COMPLICATION, WITH LONG-TERM CURRENT USE OF INSULIN (HCC): ICD-10-CM

## 2021-02-16 PROCEDURE — 99214 OFFICE O/P EST MOD 30 MIN: CPT | Performed by: NURSE PRACTITIONER

## 2021-02-16 RX ORDER — LOSARTAN POTASSIUM 100 MG/1
100 TABLET ORAL DAILY
Qty: 30 TABLET | Refills: 11 | Status: SHIPPED | OUTPATIENT
Start: 2021-02-16

## 2021-02-16 ASSESSMENT — ENCOUNTER SYMPTOMS
PND: 0
SHORTNESS OF BREATH: 0
INSOMNIA: 0
DIZZINESS: 0
COUGH: 0
FEVER: 0
LOSS OF CONSCIOUSNESS: 0
ABDOMINAL PAIN: 0
PALPITATIONS: 1
NAUSEA: 0
ORTHOPNEA: 0
CHILLS: 0
BRUISES/BLEEDS EASILY: 0
HEADACHES: 0
MYALGIAS: 0

## 2021-02-16 ASSESSMENT — FIBROSIS 4 INDEX: FIB4 SCORE: 1.23

## 2021-02-16 NOTE — PROGRESS NOTES
Chief Complaint   Patient presents with   • Follow-Up   • Palpitations   • HTN (Controlled)   • Hyperlipidemia   • Diabetes Mellitus       Subjective:   Tanya Barrow is a 45 y.o. female who presents today for six month follow-up of hypertension, hyperlipidemia, and diabetes mellitus.    Tanya is a 45 year old female with history of hypertension, hyperlipidemia and diabetes (all treated). She also has a history of chest pain, with previous MPI testing in 2015 and 2019 was normal. She was last seen by me in August 2020.    Since then, she had her gallbladder out in December 2020; she had previously tested positive for Covid-19 pre-operatively; she had some mild symptoms, which did resolve.    Since the operation, she has had some palpitations, that last for about 5 minutes at a time; she is taking her Toprol XL every day. BP has also been higher, running 150-170 systolica t home. Some mild shortness of breath with palpitations, but orthopnea or PND; no dizziness or syncope; no edema.     Past Medical History:   Diagnosis Date   • Anemia    • Blood clotting disorder (HCC)    • Chest pain 02/2019    MPI with no ischemia or infarct, LVEF 72%.   • Diabetes     Oral meds   • GERD (gastroesophageal reflux disease)    • Heart burn    • Hemorrhagic disorder (HCC)    • Hyperlipidemia    • Hypertension 2006    Medication   • Obesity    • Palpitations    • Urinary bladder disorder    • Urinary incontinence      Past Surgical History:   Procedure Laterality Date   • CHOLECYSTECTOMY ROBOTIC XI  12/9/2020    Procedure: CHOLECYSTECTOMY, ROBOT-ASSISTED, USING DA ROSA XI;  Surgeon: Nicho Copeland M.D.;  Location: SURGERY [facility];  Service: Gen Robotic   • ANTERIOR AND POSTERIOR REPAIR  2/22/2018    Procedure: ANTERIOR AND POSTERIOR REPAIR;  Surgeon: Angel Hernadez M.D.;  Location: SURGERY SAME DAY [facility];  Service: Gynecology   • ENTEROCELE REPAIR  2/22/2018    Procedure: ENTEROCELE REPAIR;  Surgeon:  Angel Hernadez M.D.;  Location: SURGERY SAME DAY [facility];  Service: Gynecology   • VAGINAL SUSPENSION N/A 2/22/2018    Procedure: VAGINAL SUSPENSION- SACROSPINOUS VAULT;  Surgeon: Angel Hernadez M.D.;  Location: SURGERY SAME DAY [facility];  Service: Gynecology   • BLADDER SLING FEMALE N/A 2/22/2018    Procedure: BLADDER SLING FEMALE- TENSION FREE VAGINAL TAPE;  Surgeon: Angel Hernadez M.D.;  Location: SURGERY SAME DAY [facility];  Service: Gynecology   • BLADDER SLING FEMALE  10/17/2017    Procedure: BLADDER SLING FEMALE - TENSION FREE TAPE PROCEDURE;  Surgeon: Angel Hernadez M.D.;  Location: SURGERY SAME DAY [facility];  Service: Gynecology   • CYSTOSCOPY N/A 10/17/2017    Procedure: CYSTOSCOPY;  Surgeon: Angel Hernadez M.D.;  Location: SURGERY SAME DAY [facility];  Service: Gynecology   • ANTERIOR AND POSTERIOR REPAIR  10/17/2017    Procedure: ANTERIOR AND POSTERIOR REPAIR W/UPHOLD MESH;  Surgeon: Angel Hernadez M.D.;  Location: SURGERY SAME DAY [facility];  Service: Gynecology   • ENTEROCELE REPAIR  10/17/2017    Procedure: ENTEROCELE REPAIR - PERINEOPLASTY;  Surgeon: Angel Hernadez M.D.;  Location: SURGERY SAME DAY [facility];  Service: Gynecology   • VAGINAL SUSPENSION  10/17/2017    Procedure: VAGINAL SUSPENSION - SACROSPINOUS VAULT;  Surgeon: Angel Hernadez M.D.;  Location: SURGERY SAME DAY [facility];  Service: Gynecology   • BLADDER SLING FEMALE  4/11/2017    Procedure: BLADDER SLING FEMALE-TOT;  Surgeon: Angel Hernadez M.D.;  Location: SURGERY SAME DAY [facility];  Service:    • ANTERIOR AND POSTERIOR REPAIR  4/11/2017    Procedure: ANTERIOR AND POSTERIOR REPAIR;  Surgeon: Angel Hernadez M.D.;  Location: SURGERY SAME DAY [facility];  Service:    • ENTEROCELE REPAIR  4/11/2017    Procedure: ENTEROCELE REPAIR- PERINEOPLASTY;  Surgeon: Angel Hernadez M.D.;  Location: SURGERY SAME DAY [facility];  Service:    • VAGINAL SUSPENSION  4/11/2017    Procedure: VAGINAL  SUSPENSION-SACROSPINOUS VAULT;  Surgeon: Angel Hernadez M.D.;  Location: SURGERY SAME DAY [facility];  Service:    • BLADDER SLING FEMALE  10/25/2016    Procedure: BLADDER SLING FEMALE TOT;  Surgeon: Angel Hernadez M.D.;  Location: SURGERY SAME DAY [facility];  Service:    • VAGINAL HYSTERECTOMY SCOPE TOTAL  10/25/2016    Procedure: VAGINAL HYSTERECTOMY SCOPE TOTAL;  Surgeon: Angel Hernadez M.D.;  Location: SURGERY SAME DAY [facility];  Service:    • SALPINGECTOMY Bilateral 10/25/2016    Procedure: SALPINGECTOMY;  Surgeon: Angel Hernadez M.D.;  Location: SURGERY SAME DAY [facility];  Service:    • ANTERIOR AND POSTERIOR REPAIR  10/25/2016    Procedure: ANTERIOR AND POSTERIOR REPAIR;  Surgeon: Angel Hernadez M.D.;  Location: SURGERY SAME DAY [facility];  Service:    • ENTEROCELE REPAIR  10/25/2016    Procedure: ENTEROCELE REPAIR, PERINEOPLASTY;  Surgeon: Angel Hernadez M.D.;  Location: SURGERY SAME DAY [facility];  Service:    • VAGINAL SUSPENSION  10/25/2016    Procedure: VAGINAL SUSPENSION SACROSPINOUS VAULT ;  Surgeon: Angel Hernadez M.D.;  Location: SURGERY SAME DAY [facility];  Service:    • OTHER      Tubal ligation     Family History   Problem Relation Age of Onset   • Diabetes Mother         pills   • Hypertension Mother    • Diabetes Father         insulin   • Diabetes Paternal Grandmother    • Diabetes Paternal Aunt      Social History     Socioeconomic History   • Marital status: Single     Spouse name: Not on file   • Number of children: Not on file   • Years of education: Not on file   • Highest education level: Not on file   Occupational History   • Not on file   Tobacco Use   • Smoking status: Never Smoker   • Smokeless tobacco: Never Used   Substance and Sexual Activity   • Alcohol use: No   • Drug use: No   • Sexual activity: Yes     Partners: Male   Other Topics Concern   • Not on file   Social History Narrative   • Not on file     Social Determinants of Health      Financial Resource Strain:    • Difficulty of Paying Living Expenses:    Food Insecurity:    • Worried About Running Out of Food in the Last Year:    • Ran Out of Food in the Last Year:    Transportation Needs:    • Lack of Transportation (Medical):    • Lack of Transportation (Non-Medical):    Physical Activity:    • Days of Exercise per Week:    • Minutes of Exercise per Session:    Stress:    • Feeling of Stress :    Social Connections:    • Frequency of Communication with Friends and Family:    • Frequency of Social Gatherings with Friends and Family:    • Attends [employer] Services:    • Active Member of Clubs or Organizations:    • Attends Club or Organization Meetings:    • Marital Status:    Intimate Partner Violence:    • Fear of Current or Ex-Partner:    • Emotionally Abused:    • Physically Abused:    • Sexually Abused:      Allergies   Allergen Reactions   • Latex      Condons, rash  Latex gloves, rash     Outpatient Encounter Medications as of 2/16/2021   Medication Sig Dispense Refill   • losartan (COZAAR) 100 MG Tab Take 1 tablet by mouth every day. 30 tablet 11   • atorvastatin (LIPITOR) 40 MG Tab Take 40 mg by mouth every day.     • famotidine (PEPCID) 20 MG Tab      • omeprazole (PRILOSEC) 20 MG delayed-release capsule Take 20 mg by mouth every day.     • Ascorbic Acid (VITAMIN C ER PO) Take  by mouth.     • Acetaminophen (TYLENOL PO) Take  by mouth.     • benzonatate (TESSALON) 100 MG Cap Take 1 Cap by mouth 3 times a day as needed for Cough. 30 Cap 0   • albuterol 108 (90 Base) MCG/ACT Aero Soln inhalation aerosol Inhale 2 Puffs every 6 hours as needed for Shortness of Breath.     • metoprolol SR (TOPROL XL) 50 MG TABLET SR 24 HR Take 1 Tab by mouth 1 time daily as needed (palpitations). 90 Tab 3   • metformin (GLUCOPHAGE) 1000 MG tablet Take 1,000 mg by mouth 2 times a day, with meals.     • fluticasone (FLONASE) 50 MCG/ACT nasal spray Spray 1 Spray in nose every day.     • Insulin Glargine  "(BASAGLAR KWIKPEN) 100 UNIT/ML Solution Pen-injector      • aspirin EC (ECOTRIN) 81 MG Tablet Delayed Response Take 81 mg by mouth every morning.     • [DISCONTINUED] losartan (COZAAR) 50 MG Tab Take 50 mg by mouth every day.     • [DISCONTINUED] nitrofurantoin (MACROBID) 100 MG Cap      • [DISCONTINUED] sucralfate (CARAFATE) 1 GM Tab      • [DISCONTINUED] predniSONE (DELTASONE) 20 MG Tab      • [DISCONTINUED] losartan (COZAAR) 25 MG Tab Take 25 mg by mouth every day.     • [DISCONTINUED] raNITidine (ZANTAC) 150 MG Tab Take 150 mg by mouth 2 times a day.     • ONE TOUCH ULTRA TEST strip TEST BLOOD SUGAR QAM  1   • ONETOUCH DELICA LANCETS 33G Misc U UTD QAM  5     No facility-administered encounter medications on file as of 2/16/2021.     Review of Systems   Constitutional: Negative for chills and fever.   HENT: Negative for congestion.    Respiratory: Negative for cough and shortness of breath.    Cardiovascular: Positive for palpitations. Negative for chest pain, orthopnea, leg swelling and PND.   Gastrointestinal: Negative for abdominal pain and nausea.   Musculoskeletal: Negative for myalgias.   Skin: Negative for rash.   Neurological: Negative for dizziness, loss of consciousness and headaches.   Endo/Heme/Allergies: Does not bruise/bleed easily.   Psychiatric/Behavioral: The patient does not have insomnia.         Objective:   /86 (BP Location: Left arm, Patient Position: Sitting, BP Cuff Size: Adult)   Pulse 77   Ht 1.626 m (5' 4\")   Wt 97.5 kg (215 lb)   LMP 10/13/2016 (Exact Date)   SpO2 96%   BMI 36.90 kg/m²     Physical Exam   Constitutional: She is oriented to person, place, and time. She appears well-developed and well-nourished.   BMI 36.90 (weight is down slightly)   HENT:   Head: Normocephalic.   Eyes: EOM are normal.   Neck: No JVD present.   Cardiovascular: Normal rate, regular rhythm and normal heart sounds.   Pulmonary/Chest: Effort normal and breath sounds normal. No respiratory " distress. She has no wheezes. She has no rales.   Abdominal: Soft. Bowel sounds are normal. She exhibits no distension. There is no abdominal tenderness.   Musculoskeletal:         General: No edema. Normal range of motion.      Cervical back: Normal range of motion and neck supple.   Neurological: She is alert and oriented to person, place, and time.   Skin: Skin is warm and dry. No rash noted.   Psychiatric: She has a normal mood and affect.     NUCLEAR IMAGING INTERPRETATION OF MPI OF 2/15/2019:   No evidence of significant jeopardized viable myocardium or prior myocardial    infarction.   Normal left ventricular size, ejection fraction, and wall motion.   ECG INTERPRETATION   Negative stress ECG for ischemia.    CONCLUSIONS OF ECHOCARDIOGRAM OF 11/20/2017:  No prior study is available for comparison.   Left ventricular ejection fraction is visually estimated to be 65%.  Normal inferior vena cava size and inspiratory collapse.  Normal transthoracic echocardiogram.       Lab Results   Component Value Date/Time    SODIUM 137 11/15/2020 08:39 PM    POTASSIUM 4.1 11/15/2020 08:39 PM    CHLORIDE 103 11/15/2020 08:39 PM    CO2 23 11/15/2020 08:39 PM    GLUCOSE 275 (H) 11/15/2020 08:39 PM    BUN 10 11/15/2020 08:39 PM    CREATININE 0.62 11/15/2020 08:39 PM    CREATININE 0.6 05/27/2007 12:01 AM     Lab Results   Component Value Date/Time    ALKPHOSPHAT 84 11/15/2020 08:39 PM    ASTSGOT 31 11/15/2020 08:39 PM    ALTSGPT 53 (H) 11/15/2020 08:39 PM    TBILIRUBIN 0.2 11/15/2020 08:39 PM        Assessment:     1. Palpitations  TSH    CBC WITH DIFFERENTIAL    Basic Metabolic Panel    Holter Monitor Study   2. Essential hypertension, benign  losartan (COZAAR) 100 MG Tab   3. Mixed hyperlipidemia     4. Type 2 diabetes mellitus without complication, with long-term current use of insulin (HCC)         Medical Decision Making:  Today's Assessment / Status / Plan:     1. Palpitations, increasing since cholecystectomy. To check CBC,  BMP and TSH today. To also obtain holter monitor ([employer]). Can use additional 1/2 dose of Toprol XL for sustained palpitations.    2. Hypertension, treated, and elevated. To increase Losartan from 50mg to 100mg once daily.    3. Hyperlipidemia, treated with Lipitor.    4. Diabetes mellitus, treated. To check BMP; may need HgbA1c too.    Plan as above: labs as above, holter monitor and increase Losartan from 50mg to 100mg daily. FU with me in 6-8 weeks, sooner if clinical condition changes.

## 2021-02-16 NOTE — PATIENT INSTRUCTIONS
Increase Losartan from 50mg to 100mg (take 2 50mg tablets until gone); the new prescription will by 100mg tablets.    We will also do some bloodwork, and a heart monitor.    Follow-up in 6 weeks to discuss results.

## 2021-02-17 DIAGNOSIS — R00.2 PALPITATIONS: ICD-10-CM

## 2021-02-23 ENCOUNTER — HOSPITAL ENCOUNTER (EMERGENCY)
Dept: HOSPITAL 8 - ED | Age: 46
Discharge: HOME | End: 2021-02-23
Payer: MEDICAID

## 2021-02-23 VITALS — DIASTOLIC BLOOD PRESSURE: 94 MMHG | SYSTOLIC BLOOD PRESSURE: 153 MMHG

## 2021-02-23 DIAGNOSIS — K21.9: ICD-10-CM

## 2021-02-23 DIAGNOSIS — E11.9: ICD-10-CM

## 2021-02-23 DIAGNOSIS — E78.5: ICD-10-CM

## 2021-02-23 DIAGNOSIS — R00.2: ICD-10-CM

## 2021-02-23 DIAGNOSIS — I10: ICD-10-CM

## 2021-02-23 DIAGNOSIS — R07.89: Primary | ICD-10-CM

## 2021-02-23 DIAGNOSIS — N12: ICD-10-CM

## 2021-02-23 DIAGNOSIS — E78.00: ICD-10-CM

## 2021-02-23 LAB
ALBUMIN SERPL-MCNC: 3.4 G/DL (ref 3.4–5)
ALP SERPL-CCNC: 98 U/L (ref 45–117)
ALT SERPL-CCNC: 50 U/L (ref 12–78)
ANION GAP SERPL CALC-SCNC: 8 MMOL/L (ref 5–15)
BASOPHILS # BLD AUTO: 0 X10^3/UL (ref 0–0.1)
BASOPHILS NFR BLD AUTO: 0 % (ref 0–1)
BILIRUB SERPL-MCNC: 0.3 MG/DL (ref 0.2–1)
CALCIUM SERPL-MCNC: 8.7 MG/DL (ref 8.5–10.1)
CHLORIDE SERPL-SCNC: 106 MMOL/L (ref 98–107)
CREAT SERPL-MCNC: 0.76 MG/DL (ref 0.55–1.02)
EOSINOPHIL # BLD AUTO: 0.1 X10^3/UL (ref 0–0.4)
EOSINOPHIL NFR BLD AUTO: 1 % (ref 1–7)
ERYTHROCYTE [DISTWIDTH] IN BLOOD BY AUTOMATED COUNT: 13.6 % (ref 9.6–15.2)
LYMPHOCYTES # BLD AUTO: 1.9 X10^3/UL (ref 1–3.4)
LYMPHOCYTES NFR BLD AUTO: 17 % (ref 22–44)
MCH RBC QN AUTO: 27.8 PG (ref 27–34.8)
MCHC RBC AUTO-ENTMCNC: 33.3 G/DL (ref 32.4–35.8)
MD: NO
MICROSCOPIC: (no result)
MONOCYTES # BLD AUTO: 0.5 X10^3/UL (ref 0.2–0.8)
MONOCYTES NFR BLD AUTO: 4 % (ref 2–9)
NEUTROPHILS # BLD AUTO: 8.2 X10^3/UL (ref 1.8–6.8)
NEUTROPHILS NFR BLD AUTO: 77 % (ref 42–75)
PLATELET # BLD AUTO: 214 X10^3/UL (ref 130–400)
PMV BLD AUTO: 9.6 FL (ref 7.4–10.4)
PROT SERPL-MCNC: 7.5 G/DL (ref 6.4–8.2)
RBC # BLD AUTO: 4.79 X10^6/UL (ref 3.82–5.3)
TROPONIN I SERPL-MCNC: < 0.015 NG/ML (ref 0–0.04)

## 2021-02-23 PROCEDURE — 71045 X-RAY EXAM CHEST 1 VIEW: CPT

## 2021-02-23 PROCEDURE — 84484 ASSAY OF TROPONIN QUANT: CPT

## 2021-02-23 PROCEDURE — 96375 TX/PRO/DX INJ NEW DRUG ADDON: CPT

## 2021-02-23 PROCEDURE — 83880 ASSAY OF NATRIURETIC PEPTIDE: CPT

## 2021-02-23 PROCEDURE — 99285 EMERGENCY DEPT VISIT HI MDM: CPT

## 2021-02-23 PROCEDURE — 85025 COMPLETE CBC W/AUTO DIFF WBC: CPT

## 2021-02-23 PROCEDURE — 81001 URINALYSIS AUTO W/SCOPE: CPT

## 2021-02-23 PROCEDURE — 96374 THER/PROPH/DIAG INJ IV PUSH: CPT

## 2021-02-23 PROCEDURE — 93005 ELECTROCARDIOGRAM TRACING: CPT

## 2021-02-23 PROCEDURE — 36415 COLL VENOUS BLD VENIPUNCTURE: CPT

## 2021-02-23 PROCEDURE — 80053 COMPREHEN METABOLIC PANEL: CPT

## 2021-02-23 NOTE — NUR
Pt resting in bed, connected to all monitors. Bedrails up, call light in reach, 
daugther at bedside.

## 2021-02-23 NOTE — NUR
Patient presents to ER c/o intermittent palpitations, nausea, dizziness, and 
bilat flank pain x2 weeks. Patient states this morning she was sleeping and at 
0400 she woke up with palpitations and a throbbing sensation in her left side 
chest. Patient denies N/V, SOB, or urinary symptoms. Patient has been seeing a 
cardiologist and is supposed to get a halter moniter next week. 

Patient is in NAD. REspirations even and unlabored.

## 2021-03-11 ENCOUNTER — NON-PROVIDER VISIT (OUTPATIENT)
Dept: CARDIOLOGY | Facility: MEDICAL CENTER | Age: 46
End: 2021-03-11
Payer: COMMERCIAL

## 2021-03-11 DIAGNOSIS — R00.2 PALPITATIONS: ICD-10-CM

## 2021-03-11 NOTE — NON-PROVIDER
Patient Enrolled in 14 day [employer] ePatch Program  >Monitor placed at [facility]  >Baseline EKG transmitted  >Pending EOS    INS: [employer]

## 2021-03-30 ENCOUNTER — APPOINTMENT (OUTPATIENT)
Dept: CARDIOLOGY | Facility: MEDICAL CENTER | Age: 46
End: 2021-03-30
Payer: COMMERCIAL

## 2021-04-02 ENCOUNTER — TELEPHONE (OUTPATIENT)
Dept: CARDIOLOGY | Facility: MEDICAL CENTER | Age: 46
End: 2021-04-02

## 2021-04-02 NOTE — TELEPHONE ENCOUNTER
----- Message from OTF Salas sent at 4/2/2021 11:28 AM PDT -----  Please let pt know that Cecilia showed normal sinus rhythm with very rare PACs and PVCs (<15 and 5 respectively), so basically a normal heart monitor.  How is she feeling?  Thanks, AB

## 2021-04-06 ENCOUNTER — OFFICE VISIT (OUTPATIENT)
Dept: CARDIOLOGY | Facility: MEDICAL CENTER | Age: 46
End: 2021-04-06
Payer: COMMERCIAL

## 2021-04-06 VITALS
WEIGHT: 214 LBS | OXYGEN SATURATION: 95 % | SYSTOLIC BLOOD PRESSURE: 132 MMHG | BODY MASS INDEX: 36.54 KG/M2 | HEIGHT: 64 IN | HEART RATE: 80 BPM | DIASTOLIC BLOOD PRESSURE: 84 MMHG

## 2021-04-06 DIAGNOSIS — I10 ESSENTIAL HYPERTENSION, BENIGN: ICD-10-CM

## 2021-04-06 DIAGNOSIS — R00.2 PALPITATIONS: ICD-10-CM

## 2021-04-06 DIAGNOSIS — Z79.4 TYPE 2 DIABETES MELLITUS WITHOUT COMPLICATION, WITH LONG-TERM CURRENT USE OF INSULIN (HCC): ICD-10-CM

## 2021-04-06 DIAGNOSIS — E11.9 TYPE 2 DIABETES MELLITUS WITHOUT COMPLICATION, WITH LONG-TERM CURRENT USE OF INSULIN (HCC): ICD-10-CM

## 2021-04-06 DIAGNOSIS — E78.2 MIXED HYPERLIPIDEMIA: ICD-10-CM

## 2021-04-06 PROCEDURE — 99214 OFFICE O/P EST MOD 30 MIN: CPT | Performed by: NURSE PRACTITIONER

## 2021-04-06 RX ORDER — METOPROLOL SUCCINATE 50 MG/1
50 TABLET, EXTENDED RELEASE ORAL DAILY
Qty: 90 TABLET | Refills: 3
Start: 2021-04-06

## 2021-04-06 RX ORDER — HYDROCHLOROTHIAZIDE 25 MG/1
25 TABLET ORAL DAILY
Qty: 90 TABLET | Refills: 3
Start: 2021-04-06 | End: 2021-10-29

## 2021-04-06 ASSESSMENT — ENCOUNTER SYMPTOMS
ABDOMINAL PAIN: 0
PND: 0
HEADACHES: 0
MYALGIAS: 0
CHILLS: 0
COUGH: 0
BRUISES/BLEEDS EASILY: 0
FEVER: 0
SHORTNESS OF BREATH: 0
NAUSEA: 0
INSOMNIA: 0
DIZZINESS: 0
PALPITATIONS: 0
LOSS OF CONSCIOUSNESS: 0
ORTHOPNEA: 0

## 2021-04-06 ASSESSMENT — FIBROSIS 4 INDEX: FIB4 SCORE: 1.23

## 2021-04-06 NOTE — PROGRESS NOTES
Chief Complaint   Patient presents with   • Follow-Up   • Palpitations   • HTN (Controlled)   • Evaluation Of Medication Change   • Hyperlipidemia   • Diabetes Mellitus       Subjective:   Tanya Barrow is a 45 y.o. female who presents today for 6 week follow-up of palpitations and elevated BP.    Tanya is a 45 year old female with history of hypertension, hyperlipidemia and diabetes (all treated). She also has a history of chest pain, and previous MPI testing in 2015 and 2019 was normal.     At follow-up with me in February 2021, she had just had her gallbladder out in December 2020, and was having some increased palpitations. BP was also up. We increased Losartan from 50mg to 100mg; holter monitor was also done. Her PCP has since added HCTZ 25mg once daily as well.     She is here today for follow-up. BP is better, but she still sometimes gets some higher readings. Palpitations are improved.  No chest pain, pressure or discomfort; no shortness of breath, orthopnea or PND; no dizziness or syncope; no edema. No abdominal pain or discomfort. She states she is trying to be more active, and watch her diet.    Past Medical History:   Diagnosis Date   • Anemia    • Blood clotting disorder (HCC)    • Chest pain 02/2019    MPI with no ischemia or infarct, LVEF 72%.   • Diabetes     Oral meds   • GERD (gastroesophageal reflux disease)    • Heart burn    • Hemorrhagic disorder (HCC)    • Hyperlipidemia    • Hypertension 2006    Medication   • Obesity    • Palpitations    • Urinary bladder disorder    • Urinary incontinence      Past Surgical History:   Procedure Laterality Date   • CHOLECYSTECTOMY ROBOTIC XI  12/9/2020    Procedure: CHOLECYSTECTOMY, ROBOT-ASSISTED, USING DA ROSA XI;  Surgeon: Nicho Copeland M.D.;  Location: SURGERY [facility] ER;  Service: Gen Robotic   • ANTERIOR AND POSTERIOR REPAIR  2/22/2018    Procedure: ANTERIOR AND POSTERIOR REPAIR;  Surgeon: Angel Hernadez M.D.;  Location:  SURGERY SAME DAY [facility] ORS;  Service: Gynecology   • ENTEROCELE REPAIR  2/22/2018    Procedure: ENTEROCELE REPAIR;  Surgeon: Angel Hernadez M.D.;  Location: SURGERY SAME DAY [facility] ORS;  Service: Gynecology   • VAGINAL SUSPENSION N/A 2/22/2018    Procedure: VAGINAL SUSPENSION- SACROSPINOUS VAULT;  Surgeon: Angel Hernadez M.D.;  Location: SURGERY SAME DAY [facility] ORS;  Service: Gynecology   • BLADDER SLING FEMALE N/A 2/22/2018    Procedure: BLADDER SLING FEMALE- TENSION FREE VAGINAL TAPE;  Surgeon: Angel Hernadez M.D.;  Location: SURGERY SAME DAY [facility] ORS;  Service: Gynecology   • BLADDER SLING FEMALE  10/17/2017    Procedure: BLADDER SLING FEMALE - TENSION FREE TAPE PROCEDURE;  Surgeon: Angel Hernadez M.D.;  Location: SURGERY SAME DAY [facility];  Service: Gynecology   • CYSTOSCOPY N/A 10/17/2017    Procedure: CYSTOSCOPY;  Surgeon: Angel Hernadez M.D.;  Location: SURGERY SAME DAY [facility];  Service: Gynecology   • ANTERIOR AND POSTERIOR REPAIR  10/17/2017    Procedure: ANTERIOR AND POSTERIOR REPAIR W/UPHOLD MESH;  Surgeon: Angel Hernadez M.D.;  Location: SURGERY SAME DAY [facility];  Service: Gynecology   • ENTEROCELE REPAIR  10/17/2017    Procedure: ENTEROCELE REPAIR - PERINEOPLASTY;  Surgeon: Angel Hernadez M.D.;  Location: SURGERY SAME DAY [facility];  Service: Gynecology   • VAGINAL SUSPENSION  10/17/2017    Procedure: VAGINAL SUSPENSION - SACROSPINOUS VAULT;  Surgeon: Angel Hernadez M.D.;  Location: SURGERY SAME DAY [facility];  Service: Gynecology   • BLADDER SLING FEMALE  4/11/2017    Procedure: BLADDER SLING FEMALE-TOT;  Surgeon: Angel Hernadez M.D.;  Location: SURGERY SAME DAY [facility] ORS;  Service:    • ANTERIOR AND POSTERIOR REPAIR  4/11/2017    Procedure: ANTERIOR AND POSTERIOR REPAIR;  Surgeon: nAgel Hernadez M.D.;  Location: SURGERY SAME DAY [facility] ORS;  Service:    • ENTEROCELE REPAIR  4/11/2017    Procedure: ENTEROCELE REPAIR- PERINEOPLASTY;  Surgeon:  Angel Hernadez M.D.;  Location: SURGERY SAME DAY [facility] ORS;  Service:    • VAGINAL SUSPENSION  4/11/2017    Procedure: VAGINAL SUSPENSION-SACROSPINOUS VAULT;  Surgeon: Angel Hernadez M.D.;  Location: SURGERY SAME DAY [facility] ORS;  Service:    • BLADDER SLING FEMALE  10/25/2016    Procedure: BLADDER SLING FEMALE TOT;  Surgeon: Angel Hernadez M.D.;  Location: SURGERY SAME DAY [facility] ORS;  Service:    • VAGINAL HYSTERECTOMY SCOPE TOTAL  10/25/2016    Procedure: VAGINAL HYSTERECTOMY SCOPE TOTAL;  Surgeon: Angel Hernadez M.D.;  Location: SURGERY SAME DAY [facility];  Service:    • SALPINGECTOMY Bilateral 10/25/2016    Procedure: SALPINGECTOMY;  Surgeon: Angel Hernadez M.D.;  Location: SURGERY SAME DAY [facility];  Service:    • ANTERIOR AND POSTERIOR REPAIR  10/25/2016    Procedure: ANTERIOR AND POSTERIOR REPAIR;  Surgeon: Angel Hernadez M.D.;  Location: SURGERY SAME DAY [facility];  Service:    • ENTEROCELE REPAIR  10/25/2016    Procedure: ENTEROCELE REPAIR, PERINEOPLASTY;  Surgeon: Angel Hernadez M.D.;  Location: SURGERY SAME DAY [facility];  Service:    • VAGINAL SUSPENSION  10/25/2016    Procedure: VAGINAL SUSPENSION SACROSPINOUS VAULT ;  Surgeon: Angel Hernadez M.D.;  Location: SURGERY SAME DAY [facility];  Service:    • OTHER      Tubal ligation     Family History   Problem Relation Age of Onset   • Diabetes Mother         pills   • Hypertension Mother    • Diabetes Father         insulin   • Diabetes Paternal Grandmother    • Diabetes Paternal Aunt      Social History     Socioeconomic History   • Marital status: Single     Spouse name: Not on file   • Number of children: Not on file   • Years of education: Not on file   • Highest education level: Not on file   Occupational History   • Not on file   Tobacco Use   • Smoking status: Never Smoker   • Smokeless tobacco: Never Used   Substance and Sexual Activity   • Alcohol use: No   • Drug use: No   • Sexual activity: Yes     Partners:  Male   Other Topics Concern   • Not on file   Social History Narrative   • Not on file     Social Determinants of Health     Financial Resource Strain:    • Difficulty of Paying Living Expenses:    Food Insecurity:    • Worried About Running Out of Food in the Last Year:    • Ran Out of Food in the Last Year:    Transportation Needs:    • Lack of Transportation (Medical):    • Lack of Transportation (Non-Medical):    Physical Activity:    • Days of Exercise per Week:    • Minutes of Exercise per Session:    Stress:    • Feeling of Stress :    Social Connections:    • Frequency of Communication with Friends and Family:    • Frequency of Social Gatherings with Friends and Family:    • Attends [employer] Services:    • Active Member of Clubs or Organizations:    • Attends Club or Organization Meetings:    • Marital Status:    Intimate Partner Violence:    • Fear of Current or Ex-Partner:    • Emotionally Abused:    • Physically Abused:    • Sexually Abused:      Allergies   Allergen Reactions   • Latex      Condons, rash  Latex gloves, rash     Outpatient Encounter Medications as of 4/6/2021   Medication Sig Dispense Refill   • metoprolol SR (TOPROL XL) 50 MG TABLET SR 24 HR Take 1 tablet by mouth every day. 90 tablet 3   • hydroCHLOROthiazide (HYDRODIURIL) 25 MG Tab Take 1 tablet by mouth every day. 90 tablet 3   • losartan (COZAAR) 100 MG Tab Take 1 tablet by mouth every day. 30 tablet 11   • atorvastatin (LIPITOR) 40 MG Tab Take 40 mg by mouth every day.     • Ascorbic Acid (VITAMIN C ER PO) Take  by mouth.     • Acetaminophen (TYLENOL PO) Take  by mouth.     • albuterol 108 (90 Base) MCG/ACT Aero Soln inhalation aerosol Inhale 2 Puffs every 6 hours as needed for Shortness of Breath.     • metformin (GLUCOPHAGE) 1000 MG tablet Take 1,000 mg by mouth 2 times a day, with meals.     • ONE TOUCH ULTRA TEST strip TEST BLOOD SUGAR QAM  1   • Insulin Glargine (BASAGLAR KWIKPEN) 100 UNIT/ML Solution Pen-injector      •  "ONETOUCH DELICA LANCETS 33G [facility] UTD QAM  5   • aspirin EC (ECOTRIN) 81 MG Tablet Delayed Response Take 81 mg by mouth every morning.     • famotidine (PEPCID) 20 MG Tab      • omeprazole (PRILOSEC) 20 MG delayed-release capsule Take 20 mg by mouth every day.     • [DISCONTINUED] benzonatate (TESSALON) 100 MG Cap Take 1 Cap by mouth 3 times a day as needed for Cough. (Patient not taking: Reported on 4/6/2021) 30 Cap 0   • [DISCONTINUED] metoprolol SR (TOPROL XL) 50 MG TABLET SR 24 HR Take 1 Tab by mouth 1 time daily as needed (palpitations). 90 Tab 3   • fluticasone (FLONASE) 50 MCG/ACT nasal spray Spray 1 Spray in nose every day.       No facility-administered encounter medications on file as of 4/6/2021.     Review of Systems   Constitutional: Negative for chills and fever.   HENT: Negative for congestion.    Respiratory: Negative for cough and shortness of breath.    Cardiovascular: Negative for chest pain, palpitations, orthopnea, leg swelling and PND.   Gastrointestinal: Negative for abdominal pain and nausea.   Musculoskeletal: Negative for myalgias.   Skin: Negative for rash.   Neurological: Negative for dizziness, loss of consciousness and headaches.   Endo/Heme/Allergies: Does not bruise/bleed easily.   Psychiatric/Behavioral: The patient does not have insomnia.         Objective:   /84 (BP Location: Left arm, Patient Position: Sitting, BP Cuff Size: Adult)   Pulse 80   Ht 1.626 m (5' 4\")   Wt 97.1 kg (214 lb)   LMP 10/13/2016 (Exact Date)   SpO2 95%   BMI 36.73 kg/m²     Physical Exam   Constitutional: She is oriented to person, place, and time. She appears well-developed and well-nourished.   BMI 36.73 (weight is down slightly)   HENT:   Head: Normocephalic.   Eyes: EOM are normal.   Neck: No JVD present.   Cardiovascular: Normal rate, regular rhythm and normal heart sounds.   Pulmonary/Chest: Effort normal and breath sounds normal. No respiratory distress. She has no wheezes. She has no " rales.   Abdominal: Soft. Bowel sounds are normal. She exhibits no distension. There is no abdominal tenderness.   Musculoskeletal:         General: No edema. Normal range of motion.      Cervical back: Normal range of motion and neck supple.   Neurological: She is alert and oriented to person, place, and time.   Skin: Skin is warm and dry. No rash noted.   Psychiatric: She has a normal mood and affect.     BIOTEL MONITOR OF 3/11/2021:  Average HR 78bpm  Minimum HR 45bpm (at 4:26pm)  Maximum HR 129bpm  No AF events  No SVT  No bradycardia  No heart block  No pauses  No VT  Rare VE (<0.1% of total time)    LABS AS OF 2/16/2021:  Glucose 228  BUN 10  Creatinine 0.57    Potassium 3.7  TSH 0.95  WBC 7.4  RBC 4.91  Hgb 13.2  Hct 41.5  Platelets 241    Assessment:     1. Palpitations  metoprolol SR (TOPROL XL) 50 MG TABLET SR 24 HR   2. Essential hypertension, benign  hydroCHLOROthiazide (HYDRODIURIL) 25 MG Tab   3. Mixed hyperlipidemia     4. Type 2 diabetes mellitus without complication, with long-term current use of insulin ([facility])         Medical Decision Making:  Today's Assessment / Status / Plan:     1. Palpitations, stable on daily Toprol XL. No recurrence of symptoms.    2. Hypertension, treated with Toprol XL, Losartan and HCTZ. BP is better. To keep taking same meds/doses and monitor BP at home. Work on diet and exercise, along with weight loss. Will FU with me in 4 months.    3. Hyperlipidemia, treated with Lipitor.    4. Diabetes mellitus, treated. To keep working on lifestyle modifications.    Plan as above: same medications for now. Monitor BP and glucose at home. Reassured regarding results of [employer] monitor. Previous stress tests and echocardiograms have been normal. FU with me in 4 months, sooner if clinical condition changes.

## 2021-04-21 PROCEDURE — 93248 EXT ECG>7D<15D REV&INTERPJ: CPT | Performed by: INTERNAL MEDICINE

## 2021-04-21 PROCEDURE — 93246 EXT ECG>7D<15D RECORDING: CPT | Performed by: INTERNAL MEDICINE

## 2021-04-22 ENCOUNTER — TELEPHONE (OUTPATIENT)
Dept: CARDIOLOGY | Facility: MEDICAL CENTER | Age: 46
End: 2021-04-22

## 2021-04-22 NOTE — TELEPHONE ENCOUNTER
"----- Message from Esmer Lopez, Med Ass't sent at 4/22/2021  9:12 AM PDT -----  Regarding: FW: Zio Interpretation  Jojo called and Alma discussed results at 4/6/21 appointment  ----- Message -----  From: Xu To M.D.  Sent: 4/21/2021   6:43 PM PDT  To: Jina Wilkerson R.N., Esmer Lopez, Med Ass't  Subject: RE: Zio Interpretation                           Done    Jina can you or AB RN let pt know it was normal    Thank you  ----- Message -----  From: Esmer Lopez, Med Ass't  Sent: 4/21/2021  11:46 AM PDT  To: Xu To M.D.  Subject: Zio Interpretation                               Hi Dr. To    AB ordered this patient's Zio and has already reviewed it. Would you be able to interpret it as ADD when you have a minute, so that I can drop charges for it? It is scanned under \"Holter Monitor Study.\"    Thank you,  Esmer, Medical Assistant  [facility]  [phone]           "

## 2021-04-30 ENCOUNTER — HOSPITAL ENCOUNTER (EMERGENCY)
Dept: HOSPITAL 8 - ED | Age: 46
Discharge: HOME | End: 2021-04-30
Payer: MEDICAID

## 2021-04-30 VITALS — WEIGHT: 214.95 LBS | HEIGHT: 65 IN | BODY MASS INDEX: 35.81 KG/M2

## 2021-04-30 VITALS — SYSTOLIC BLOOD PRESSURE: 161 MMHG | DIASTOLIC BLOOD PRESSURE: 95 MMHG

## 2021-04-30 DIAGNOSIS — R51.9: ICD-10-CM

## 2021-04-30 DIAGNOSIS — E11.9: ICD-10-CM

## 2021-04-30 DIAGNOSIS — I10: Primary | ICD-10-CM

## 2021-04-30 DIAGNOSIS — R04.0: ICD-10-CM

## 2021-04-30 DIAGNOSIS — E87.6: ICD-10-CM

## 2021-04-30 DIAGNOSIS — K21.9: ICD-10-CM

## 2021-04-30 LAB
ALBUMIN SERPL-MCNC: 3.8 G/DL (ref 3.4–5)
ANION GAP SERPL CALC-SCNC: 6 MMOL/L (ref 5–15)
BASOPHILS # BLD AUTO: 0 X10^3/UL (ref 0–0.1)
BASOPHILS NFR BLD AUTO: 1 % (ref 0–1)
CALCIUM SERPL-MCNC: 9.1 MG/DL (ref 8.5–10.1)
CHLORIDE SERPL-SCNC: 105 MMOL/L (ref 98–107)
CREAT SERPL-MCNC: 0.64 MG/DL (ref 0.55–1.02)
EOSINOPHIL # BLD AUTO: 0.1 X10^3/UL (ref 0–0.4)
EOSINOPHIL NFR BLD AUTO: 2 % (ref 1–7)
ERYTHROCYTE [DISTWIDTH] IN BLOOD BY AUTOMATED COUNT: 13.9 % (ref 9.6–15.2)
LYMPHOCYTES # BLD AUTO: 2.8 X10^3/UL (ref 1–3.4)
LYMPHOCYTES NFR BLD AUTO: 34 % (ref 22–44)
MCH RBC QN AUTO: 27.5 PG (ref 27–34.8)
MCHC RBC AUTO-ENTMCNC: 33 G/DL (ref 32.4–35.8)
MD: NO
MONOCYTES # BLD AUTO: 0.4 X10^3/UL (ref 0.2–0.8)
MONOCYTES NFR BLD AUTO: 5 % (ref 2–9)
NEUTROPHILS # BLD AUTO: 4.8 X10^3/UL (ref 1.8–6.8)
NEUTROPHILS NFR BLD AUTO: 58 % (ref 42–75)
PLATELET # BLD AUTO: 212 X10^3/UL (ref 130–400)
PMV BLD AUTO: 9.8 FL (ref 7.4–10.4)
RBC # BLD AUTO: 4.75 X10^6/UL (ref 3.82–5.3)

## 2021-04-30 PROCEDURE — 70450 CT HEAD/BRAIN W/O DYE: CPT

## 2021-04-30 PROCEDURE — 36415 COLL VENOUS BLD VENIPUNCTURE: CPT

## 2021-04-30 PROCEDURE — 99285 EMERGENCY DEPT VISIT HI MDM: CPT

## 2021-04-30 PROCEDURE — 85025 COMPLETE CBC W/AUTO DIFF WBC: CPT

## 2021-04-30 PROCEDURE — 82040 ASSAY OF SERUM ALBUMIN: CPT

## 2021-04-30 PROCEDURE — 93005 ELECTROCARDIOGRAM TRACING: CPT

## 2021-04-30 PROCEDURE — 80048 BASIC METABOLIC PNL TOTAL CA: CPT

## 2021-04-30 NOTE — NUR
THIS IS A 44 YO F W/ C/O SUDDEN ONSET HA AND NOSEBLEED TODAY WHILE AT WORK. PT 
REPORTS HAVING HER BP CHECKED AT WORK AND IT WAS IN 'S SYSTOLIC. PT 
REPORTS TAKES MEDS FOR HTN AND DM. PT RESTING ON GURNEY W/ CALL LIGHT IN REACH 
AND SIDE RAILS UPX2. FAMILY AT BEDSIDE. HYPERTENSIVE, OTHER VS WDL. NADN. 
AWAITING ED EVAL.

## 2021-05-04 ENCOUNTER — HOSPITAL ENCOUNTER (EMERGENCY)
Facility: MEDICAL CENTER | Age: 46
End: 2021-05-04
Attending: EMERGENCY MEDICINE
Payer: COMMERCIAL

## 2021-05-04 ENCOUNTER — APPOINTMENT (OUTPATIENT)
Dept: RADIOLOGY | Facility: MEDICAL CENTER | Age: 46
End: 2021-05-04
Attending: EMERGENCY MEDICINE
Payer: COMMERCIAL

## 2021-05-04 VITALS
BODY MASS INDEX: 35.45 KG/M2 | OXYGEN SATURATION: 93 % | HEART RATE: 68 BPM | SYSTOLIC BLOOD PRESSURE: 162 MMHG | WEIGHT: 212.74 LBS | HEIGHT: 65 IN | DIASTOLIC BLOOD PRESSURE: 82 MMHG | TEMPERATURE: 97.8 F | RESPIRATION RATE: 18 BRPM

## 2021-05-04 DIAGNOSIS — R07.89 CHEST WALL PAIN: ICD-10-CM

## 2021-05-04 LAB
ALBUMIN SERPL BCP-MCNC: 4.2 G/DL (ref 3.2–4.9)
ALBUMIN/GLOB SERPL: 1.2 G/DL
ALP SERPL-CCNC: 95 U/L (ref 30–99)
ALT SERPL-CCNC: 33 U/L (ref 2–50)
ANION GAP SERPL CALC-SCNC: 14 MMOL/L (ref 7–16)
AST SERPL-CCNC: 25 U/L (ref 12–45)
BASOPHILS # BLD AUTO: 0.5 % (ref 0–1.8)
BASOPHILS # BLD: 0.04 K/UL (ref 0–0.12)
BILIRUB SERPL-MCNC: 0.2 MG/DL (ref 0.1–1.5)
BUN SERPL-MCNC: 10 MG/DL (ref 8–22)
CALCIUM SERPL-MCNC: 9.7 MG/DL (ref 8.4–10.2)
CHLORIDE SERPL-SCNC: 100 MMOL/L (ref 96–112)
CO2 SERPL-SCNC: 26 MMOL/L (ref 20–33)
CREAT SERPL-MCNC: 0.57 MG/DL (ref 0.5–1.4)
EKG IMPRESSION: NORMAL
EOSINOPHIL # BLD AUTO: 0.13 K/UL (ref 0–0.51)
EOSINOPHIL NFR BLD: 1.5 % (ref 0–6.9)
ERYTHROCYTE [DISTWIDTH] IN BLOOD BY AUTOMATED COUNT: 40 FL (ref 35.9–50)
GLOBULIN SER CALC-MCNC: 3.4 G/DL (ref 1.9–3.5)
GLUCOSE SERPL-MCNC: 169 MG/DL (ref 65–99)
HCT VFR BLD AUTO: 42 % (ref 37–47)
HGB BLD-MCNC: 13.3 G/DL (ref 12–16)
IMM GRANULOCYTES # BLD AUTO: 0.01 K/UL (ref 0–0.11)
IMM GRANULOCYTES NFR BLD AUTO: 0.1 % (ref 0–0.9)
LYMPHOCYTES # BLD AUTO: 3.48 K/UL (ref 1–4.8)
LYMPHOCYTES NFR BLD: 40.4 % (ref 22–41)
MCH RBC QN AUTO: 26.7 PG (ref 27–33)
MCHC RBC AUTO-ENTMCNC: 31.7 G/DL (ref 33.6–35)
MCV RBC AUTO: 84.2 FL (ref 81.4–97.8)
MONOCYTES # BLD AUTO: 0.51 K/UL (ref 0–0.85)
MONOCYTES NFR BLD AUTO: 5.9 % (ref 0–13.4)
NEUTROPHILS # BLD AUTO: 4.45 K/UL (ref 2–7.15)
NEUTROPHILS NFR BLD: 51.6 % (ref 44–72)
NRBC # BLD AUTO: 0 K/UL
NRBC BLD-RTO: 0 /100 WBC
PLATELET # BLD AUTO: 234 K/UL (ref 164–446)
PMV BLD AUTO: 10.9 FL (ref 9–12.9)
POTASSIUM SERPL-SCNC: 3.9 MMOL/L (ref 3.6–5.5)
PROT SERPL-MCNC: 7.6 G/DL (ref 6–8.2)
RBC # BLD AUTO: 4.99 M/UL (ref 4.2–5.4)
SODIUM SERPL-SCNC: 140 MMOL/L (ref 135–145)
TROPONIN T SERPL-MCNC: <6 NG/L (ref 6–19)
TROPONIN T SERPL-MCNC: <6 NG/L (ref 6–19)
WBC # BLD AUTO: 8.6 K/UL (ref 4.8–10.8)

## 2021-05-04 PROCEDURE — 36415 COLL VENOUS BLD VENIPUNCTURE: CPT

## 2021-05-04 PROCEDURE — 93005 ELECTROCARDIOGRAM TRACING: CPT

## 2021-05-04 PROCEDURE — A9270 NON-COVERED ITEM OR SERVICE: HCPCS | Performed by: EMERGENCY MEDICINE

## 2021-05-04 PROCEDURE — 80053 COMPREHEN METABOLIC PANEL: CPT

## 2021-05-04 PROCEDURE — 71045 X-RAY EXAM CHEST 1 VIEW: CPT

## 2021-05-04 PROCEDURE — 84484 ASSAY OF TROPONIN QUANT: CPT

## 2021-05-04 PROCEDURE — 85025 COMPLETE CBC W/AUTO DIFF WBC: CPT

## 2021-05-04 PROCEDURE — 700102 HCHG RX REV CODE 250 W/ 637 OVERRIDE(OP): Performed by: EMERGENCY MEDICINE

## 2021-05-04 PROCEDURE — 700111 HCHG RX REV CODE 636 W/ 250 OVERRIDE (IP): Performed by: EMERGENCY MEDICINE

## 2021-05-04 PROCEDURE — 93005 ELECTROCARDIOGRAM TRACING: CPT | Performed by: EMERGENCY MEDICINE

## 2021-05-04 PROCEDURE — 96374 THER/PROPH/DIAG INJ IV PUSH: CPT

## 2021-05-04 PROCEDURE — 99284 EMERGENCY DEPT VISIT MOD MDM: CPT

## 2021-05-04 RX ORDER — CYCLOBENZAPRINE HCL 5 MG
5 TABLET ORAL 3 TIMES DAILY PRN
Qty: 30 TABLET | Refills: 0 | Status: SHIPPED | OUTPATIENT
Start: 2021-05-04 | End: 2021-10-29

## 2021-05-04 RX ORDER — CYCLOBENZAPRINE HCL 10 MG
10 TABLET ORAL ONCE
Status: DISCONTINUED | OUTPATIENT
Start: 2021-05-04 | End: 2021-05-04 | Stop reason: HOSPADM

## 2021-05-04 RX ORDER — KETOROLAC TROMETHAMINE 30 MG/ML
30 INJECTION, SOLUTION INTRAMUSCULAR; INTRAVENOUS ONCE
Status: COMPLETED | OUTPATIENT
Start: 2021-05-04 | End: 2021-05-04

## 2021-05-04 RX ADMIN — LIDOCAINE HYDROCHLORIDE 30 ML: 20 SOLUTION OROPHARYNGEAL at 18:38

## 2021-05-04 RX ADMIN — KETOROLAC TROMETHAMINE 30 MG: 30 INJECTION, SOLUTION INTRAMUSCULAR; INTRAVENOUS at 18:38

## 2021-05-04 ASSESSMENT — FIBROSIS 4 INDEX: FIB4 SCORE: 1.23

## 2021-05-04 ASSESSMENT — PAIN DESCRIPTION - PAIN TYPE: TYPE: ACUTE PAIN

## 2021-05-05 NOTE — ED TRIAGE NOTES
Tanya Barrow  45 y.o.  Chief Complaint   Patient presents with   • Chest Pain     Pt ambulatory to triage with c/o chest pain, dizzy and numbness to her L arm x 1 hour. Pt rates her pain 8/10.

## 2021-05-05 NOTE — ED NOTES
Patient discharged home in stable condition with sister  AVS provided with recommended follow up and home care instructions  Prescription for Flexeril provided at this time  Patient and sister verbalized understanding  Ambulatory at time of discharge

## 2021-05-05 NOTE — ED PROVIDER NOTES
ED Provider Note    CHIEF COMPLAINT  Chief Complaint   Patient presents with    Chest Pain         HPI  Tanya Barrow is a 45 y.o. female who presents to the ED with chest pain.  The patient has a history of diabetes hyperlipidemia hypertension, she started having chest pain at 89.  Central, sharp, constant, radiates up in throat, not down her arm.  The patient states she had a stress test 1 to 2 years ago that was negative.  She is followed by cardiology for palpitations.  The patient states she feels lightheaded and slight left hand numbness.  No shortness of breath, no diaphoresis, some nausea, no vomiting.    REVIEW OF SYSTEMS  See HPI for further details. All other systems are negative.     PAST MEDICAL HISTORY  Past Medical History:   Diagnosis Date    Anemia     Blood clotting disorder (HCC)     Chest pain 02/2019    MPI with no ischemia or infarct, LVEF 72%.    Diabetes     Oral meds    GERD (gastroesophageal reflux disease)     Heart burn     Hemorrhagic disorder (HCC)     Hyperlipidemia     Hypertension 2006    Medication    Obesity     Palpitations     Urinary bladder disorder     Urinary incontinence        FAMILY HISTORY  Family History   Problem Relation Age of Onset    Diabetes Mother         pills    Hypertension Mother     Diabetes Father         insulin    Diabetes Paternal Grandmother     Diabetes Paternal Aunt        SOCIAL HISTORY  Social History     Socioeconomic History    Marital status: Single     Spouse name: Not on file    Number of children: Not on file    Years of education: Not on file    Highest education level: Not on file   Occupational History    Not on file   Tobacco Use    Smoking status: Never Smoker    Smokeless tobacco: Never Used   Substance and Sexual Activity    Alcohol use: No    Drug use: No    Sexual activity: Yes     Partners: Male   Other Topics Concern    Not on file   Social History Narrative    Not on file     Social Determinants of Health      Financial Resource Strain:     Difficulty of Paying Living Expenses:    Food Insecurity:     Worried About Running Out of Food in the Last Year:     Ran Out of Food in the Last Year:    Transportation Needs:     Lack of Transportation (Medical):     Lack of Transportation (Non-Medical):    Physical Activity:     Days of Exercise per Week:     Minutes of Exercise per Session:    Stress:     Feeling of Stress :    Social Connections:     Frequency of Communication with Friends and Family:     Frequency of Social Gatherings with Friends and Family:     Attends Buddhism Services:     Active Member of Clubs or Organizations:     Attends Club or Organization Meetings:     Marital Status:    Intimate Partner Violence:     Fear of Current or Ex-Partner:     Emotionally Abused:     Physically Abused:     Sexually Abused:        SURGICAL HISTORY  Past Surgical History:   Procedure Laterality Date    CHOLECYSTECTOMY ROBOTIC XI  12/9/2020    Procedure: CHOLECYSTECTOMY, ROBOT-ASSISTED, USING DA ROSA XI;  Surgeon: Nicho Copeland M.D.;  [facility];  Service: Gen Robotic    ANTERIOR AND POSTERIOR REPAIR  2/22/2018    Procedure: ANTERIOR AND POSTERIOR REPAIR;  Surgeon: Angel Hernadez M.D.;  Location: SURGERY SAME DAY [facility];  Service: Gynecology    ENTEROCELE REPAIR  2/22/2018    Procedure: ENTEROCELE REPAIR;  Surgeon: Angel Hernadez M.D.;  Location: SURGERY SAME DAY [facility];  Service: Gynecology    VAGINAL SUSPENSION N/A 2/22/2018    Procedure: VAGINAL SUSPENSION- SACROSPINOUS VAULT;  Surgeon: Angel Hernadez M.D.;  Location: SURGERY SAME DAY [facility];  Service: Gynecology    BLADDER SLING FEMALE N/A 2/22/2018    Procedure: BLADDER SLING FEMALE- TENSION FREE VAGINAL TAPE;  Surgeon: Angel Hernadez M.D.;  Location: SURGERY SAME DAY [facility];  Service: Gynecology    BLADDER SLING FEMALE  10/17/2017    Procedure: BLADDER SLING FEMALE - TENSION FREE TAPE PROCEDURE;  Surgeon: Angel REBOLLEDO  CHARI Hernadez;  Location: SURGERY SAME DAY [facility];  Service: Gynecology    CYSTOSCOPY N/A 10/17/2017    Procedure: CYSTOSCOPY;  Surgeon: Angel Hernadez M.D.;  Location: SURGERY SAME DAY [facility];  Service: Gynecology    ANTERIOR AND POSTERIOR REPAIR  10/17/2017    Procedure: ANTERIOR AND POSTERIOR REPAIR W/UPHOLD MESH;  Surgeon: Angel Hernadez M.D.;  Location: SURGERY SAME DAY [facility];  Service: Gynecology    ENTEROCELE REPAIR  10/17/2017    Procedure: ENTEROCELE REPAIR - PERINEOPLASTY;  Surgeon: Angel Hernadez M.D.;  Location: SURGERY SAME DAY [facility];  Service: Gynecology    VAGINAL SUSPENSION  10/17/2017    Procedure: VAGINAL SUSPENSION - SACROSPINOUS VAULT;  Surgeon: Angel Hernadez M.D.;  Location: SURGERY SAME DAY [facility];  Service: Gynecology    BLADDER SLING FEMALE  4/11/2017    Procedure: BLADDER SLING FEMALE-TOT;  Surgeon: Angel Hernadez M.D.;  Location: SURGERY SAME DAY [facility];  Service:     ANTERIOR AND POSTERIOR REPAIR  4/11/2017    Procedure: ANTERIOR AND POSTERIOR REPAIR;  Surgeon: Angel Hernadez M.D.;  Location: SURGERY SAME DAY [facility];  Service:     ENTEROCELE REPAIR  4/11/2017    Procedure: ENTEROCELE REPAIR- PERINEOPLASTY;  Surgeon: Angel Hernadez M.D.;  Location: SURGERY SAME DAY [facility];  Service:     VAGINAL SUSPENSION  4/11/2017    Procedure: VAGINAL SUSPENSION-SACROSPINOUS VAULT;  Surgeon: Angel Hernadez M.D.;  Location: SURGERY SAME DAY [facility];  Service:     BLADDER SLING FEMALE  10/25/2016    Procedure: BLADDER SLING FEMALE TOT;  Surgeon: Angel Hernadez M.D.;  Location: SURGERY SAME DAY [facility];  Service:     VAGINAL HYSTERECTOMY SCOPE TOTAL  10/25/2016    Procedure: VAGINAL HYSTERECTOMY SCOPE TOTAL;  Surgeon: Angel Hernadez M.D.;  Location: SURGERY SAME DAY [facility];  Service:     SALPINGECTOMY Bilateral 10/25/2016    Procedure: SALPINGECTOMY;  Surgeon: Angel Hernadez M.D.;  Location: SURGERY SAME DAY [facility]  "[facility];  Service:     ANTERIOR AND POSTERIOR REPAIR  10/25/2016    Procedure: ANTERIOR AND POSTERIOR REPAIR;  Surgeon: Angel Hernadez M.D.;  Location: SURGERY SAME DAY [facility];  Service:     ENTEROCELE REPAIR  10/25/2016    Procedure: ENTEROCELE REPAIR, PERINEOPLASTY;  Surgeon: Angel Hernadez M.D.;  Location: SURGERY SAME DAY [facility];  Service:     VAGINAL SUSPENSION  10/25/2016    Procedure: VAGINAL SUSPENSION SACROSPINOUS VAULT ;  Surgeon: Angel Hernadez M.D.;  Location: SURGERY SAME DAY [facility];  Service:     OTHER      Tubal ligation       CURRENT MEDICATIONS  Home Medications    **Home medications have not yet been reviewed for this encounter**         ALLERGIES  Allergies   Allergen Reactions    Latex      Condons, rash  Latex gloves, rash       PHYSICAL EXAM  VITAL SIGNS: BP (!) 162/82   Pulse 68   Temp 36.6 °C (97.8 °F) (Temporal)   Resp 18   Ht 1.651 m (5' 5\")   Wt 96.5 kg (212 lb 11.9 oz)   LMP 10/13/2016 (Exact Date)   SpO2 93%   BMI 35.40 kg/m²   Constitutional: Well developed, Well nourished, mild distress, Non-toxic appearance.   HENT: Normocephalic, Atraumatic, Bilateral external ears normal, Oropharynx moist, No oral exudates, Nose normal.   Eyes: PERRLA, EOMI, Conjunctiva normal, No discharge.   Neck: Normal range of motion, No tenderness, Supple, No stridor.   Cardiovascular: Regular rate and rhythm, no murmurs, she does have tenderness palpation of the left anterior chest wall  Thorax & Lungs: Clear to auscultation bilaterally  Abdomen: Bowel sounds normal, Soft, No tenderness, No masses, No pulsatile masses.   Skin: Warm, Dry, No erythema, No rash.   Back: No tenderness, No CVA tenderness.   Extremities: Intact distal pulses, No tenderness, No cyanosis, No clubbing.  No edema  Neurologic: Alert & oriented x 3, Normal motor function, Normal sensory function, No focal deficits noted.     Results for orders placed or performed during the hospital encounter of 05/04/21   CBC " with Differential   Result Value Ref Range    WBC 8.6 4.8 - 10.8 K/uL    RBC 4.99 4.20 - 5.40 M/uL    Hemoglobin 13.3 12.0 - 16.0 g/dL    Hematocrit 42.0 37.0 - 47.0 %    MCV 84.2 81.4 - 97.8 fL    MCH 26.7 (L) 27.0 - 33.0 pg    MCHC 31.7 (L) 33.6 - 35.0 g/dL    RDW 40.0 35.9 - 50.0 fL    Platelet Count 234 164 - 446 K/uL    MPV 10.9 9.0 - 12.9 fL    Neutrophils-Polys 51.60 44.00 - 72.00 %    Lymphocytes 40.40 22.00 - 41.00 %    Monocytes 5.90 0.00 - 13.40 %    Eosinophils 1.50 0.00 - 6.90 %    Basophils 0.50 0.00 - 1.80 %    Immature Granulocytes 0.10 0.00 - 0.90 %    Nucleated RBC 0.00 /100 WBC    Neutrophils (Absolute) 4.45 2.00 - 7.15 K/uL    Lymphs (Absolute) 3.48 1.00 - 4.80 K/uL    Monos (Absolute) 0.51 0.00 - 0.85 K/uL    Eos (Absolute) 0.13 0.00 - 0.51 K/uL    Baso (Absolute) 0.04 0.00 - 0.12 K/uL    Immature Granulocytes (abs) 0.01 0.00 - 0.11 K/uL    NRBC (Absolute) 0.00 K/uL   Complete Metabolic Panel (CMP)   Result Value Ref Range    Sodium 140 135 - 145 mmol/L    Potassium 3.9 3.6 - 5.5 mmol/L    Chloride 100 96 - 112 mmol/L    Co2 26 20 - 33 mmol/L    Anion Gap 14.0 7.0 - 16.0    Glucose 169 (H) 65 - 99 mg/dL    Bun 10 8 - 22 mg/dL    Creatinine 0.57 0.50 - 1.40 mg/dL    Calcium 9.7 8.4 - 10.2 mg/dL    AST(SGOT) 25 12 - 45 U/L    ALT(SGPT) 33 2 - 50 U/L    Alkaline Phosphatase 95 30 - 99 U/L    Total Bilirubin 0.2 0.1 - 1.5 mg/dL    Albumin 4.2 3.2 - 4.9 g/dL    Total Protein 7.6 6.0 - 8.2 g/dL    Globulin 3.4 1.9 - 3.5 g/dL    A-G Ratio 1.2 g/dL   Troponin   Result Value Ref Range    Troponin T <6 6 - 19 ng/L   ESTIMATED GFR   Result Value Ref Range    GFR If African American >60 >60 mL/min/1.73 m 2    GFR If Non African American >60 >60 mL/min/1.73 m 2   TROPONIN   Result Value Ref Range    Troponin T <6 6 - 19 ng/L   EKG   Result Value Ref Range    Report       [facility].    Test Date:  2021-05-04  Pt Name:    ALIX CARMEN Department:  [facility]  MRN:        [id number]                      Room:  Gender:     Female                       Technician: DANNY  :        [id number]                   Requested By:ER TRIAGE PROTOCOL  Order #:    [id number]                    Reading MD: GANESH VALLEJO MD    Measurements  Intervals                                Axis  Rate:       61                           P:          40  IL:         144                          QRS:        5  QRSD:       100                          T:          5  QT:         411  QTc:        414    Interpretive Statements  Sinus rhythm  Consider inferior infarct  Compared to ECG 11/15/2020 20:09:59  Myocardial infarct finding now present  Electronically Signed On 2021 18:05:54 PDT by GANESH VALLEJO MD          RADIOLOGY/PROCEDURES  DX-CHEST-PORTABLE (1 VIEW)   Final Result      No acute cardiac or pulmonary abnormalities are identified.            COURSE & MEDICAL DECISION MAKING  Pertinent Labs & Imaging studies reviewed. (See chart for details)  Patient with nonspecific chest pain, seems anterior chest wall pain.  We will give the patient a GI cocktail, Toradol, repeat troponin II hours.    2 troponins, 2 hours apart are unremarkable.  I believe this is likely chest wall pain.  Patient has a heart score of 2-3, the patient will follow up with cardiology, will give the patient a prescription for some Flexeril, have the patient follow-up with Dr. Fields, return with worsening symptoms.      FINAL IMPRESSION  1. Chest wall pain        Patient referred to primary care provider for blood pressure management     This dictation was created using voice recognition software. The accuracy of the dictation is limited to the abilities of the software. I expect there may be some errors of grammar and possibly content. The nursing notes were reviewed and certain aspects of this information were incorporated into this note.    Electronically signed by: Ganesh Vallejo M.D., 2021 6:19  PM

## 2021-08-10 ENCOUNTER — OFFICE VISIT (OUTPATIENT)
Dept: CARDIOLOGY | Facility: MEDICAL CENTER | Age: 46
End: 2021-08-10
Payer: COMMERCIAL

## 2021-08-10 VITALS
HEIGHT: 64 IN | OXYGEN SATURATION: 94 % | HEART RATE: 78 BPM | DIASTOLIC BLOOD PRESSURE: 82 MMHG | SYSTOLIC BLOOD PRESSURE: 130 MMHG | BODY MASS INDEX: 35.85 KG/M2 | WEIGHT: 210 LBS | RESPIRATION RATE: 16 BRPM

## 2021-08-10 DIAGNOSIS — I20.89 STABLE ANGINA PECTORIS (HCC): ICD-10-CM

## 2021-08-10 DIAGNOSIS — G47.33 OBSTRUCTIVE SLEEP APNEA: ICD-10-CM

## 2021-08-10 DIAGNOSIS — R00.2 PALPITATIONS: ICD-10-CM

## 2021-08-10 DIAGNOSIS — I10 ESSENTIAL HYPERTENSION, BENIGN: ICD-10-CM

## 2021-08-10 DIAGNOSIS — E11.9 TYPE 2 DIABETES MELLITUS WITHOUT COMPLICATION, WITH LONG-TERM CURRENT USE OF INSULIN (HCC): ICD-10-CM

## 2021-08-10 DIAGNOSIS — E78.2 MIXED HYPERLIPIDEMIA: ICD-10-CM

## 2021-08-10 DIAGNOSIS — Z79.4 TYPE 2 DIABETES MELLITUS WITHOUT COMPLICATION, WITH LONG-TERM CURRENT USE OF INSULIN (HCC): ICD-10-CM

## 2021-08-10 PROCEDURE — 99214 OFFICE O/P EST MOD 30 MIN: CPT | Performed by: NURSE PRACTITIONER

## 2021-08-10 RX ORDER — PEN NEEDLE, DIABETIC 31 GX5/16"
NEEDLE, DISPOSABLE MISCELLANEOUS
COMMUNITY
Start: 2021-07-27 | End: 2022-09-20

## 2021-08-10 RX ORDER — CHLORTHALIDONE 25 MG/1
25 TABLET ORAL DAILY
COMMUNITY
Start: 2021-07-01

## 2021-08-10 RX ORDER — TRIAMCINOLONE ACETONIDE 1 MG/G
CREAM TOPICAL
COMMUNITY
Start: 2021-06-23 | End: 2021-10-29

## 2021-08-10 RX ORDER — AMMONIUM LACTATE 12 G/100G
LOTION TOPICAL
COMMUNITY
Start: 2021-07-29 | End: 2021-10-29

## 2021-08-10 ASSESSMENT — ENCOUNTER SYMPTOMS
FEVER: 0
SHORTNESS OF BREATH: 0
LOSS OF CONSCIOUSNESS: 0
INSOMNIA: 0
MYALGIAS: 0
PND: 0
HEADACHES: 0
CHILLS: 0
DIZZINESS: 0
ORTHOPNEA: 0
BRUISES/BLEEDS EASILY: 0
ABDOMINAL PAIN: 0
NAUSEA: 0
PALPITATIONS: 0
COUGH: 0

## 2021-08-10 ASSESSMENT — FIBROSIS 4 INDEX: FIB4 SCORE: 0.89

## 2021-08-10 NOTE — PROGRESS NOTES
Chief Complaint   Patient presents with   • Follow-Up   • Chest Pain   • Palpitations   • HTN (Controlled)   • Hyperlipidemia   • Diabetes Mellitus       Subjective:   Tanya Barrow is a 46 y.o. female who presents today for four month follow-up of chest pain, palpitations, hypertension, hyperlipidemia, DM and JAYLEEN.    Tanya is a 46 year old female with history of hypertension, hyperlipidemia, diabetes (all treated), palpitations and history of chest pain, with previous MPI testing in 2015 and 2019 normal. She was last seen by me in April 2021.    In early May 2021, she was seen in the [facility] ER for chest pain; EKG and troponins were normal. It was thought to be musculoskeletal or GI.      She is here today for four month follow-up. No further episodes of chest pain. Occasional palpitations, but better than previous. No shortness of breath, orthopnea or PND; no dizziness or syncope; no LE edema. She did get a CPAP, prescribed by her PCP, and she cannot tolerate it. She doesn't sleep well with it.     Past Medical History:   Diagnosis Date   • Anemia    • Blood clotting disorder (HCC)    • Chest pain 02/2019    MPI with no ischemia or infarct, LVEF 72%.   • Diabetes     Oral meds   • GERD (gastroesophageal reflux disease)    • Heart burn    • Hemorrhagic disorder (HCC)    • Hyperlipidemia    • Hypertension 2006    Medication   • Obesity    • Palpitations    • Urinary bladder disorder    • Urinary incontinence      Past Surgical History:   Procedure Laterality Date   • CHOLECYSTECTOMY ROBOTIC XI  12/9/2020    Procedure: CHOLECYSTECTOMY, ROBOT-ASSISTED, USING DA ROSA XI;  Surgeon: Nicho Copeland M.D.;  Location: SURGERY [facility];  Service: Gen Robotic   • ANTERIOR AND POSTERIOR REPAIR  2/22/2018    Procedure: ANTERIOR AND POSTERIOR REPAIR;  Surgeon: Angel Hernadez M.D.;  Location: SURGERY SAME DAY [facility];  Service: Gynecology   • ENTEROCELE REPAIR  2/22/2018    Procedure:  ENTEROCELE REPAIR;  Surgeon: Angel Hernadez M.D.;  Location: SURGERY SAME DAY [facility];  Service: Gynecology   • VAGINAL SUSPENSION N/A 2/22/2018    Procedure: VAGINAL SUSPENSION- SACROSPINOUS VAULT;  Surgeon: Angel Hernadez M.D.;  Location: SURGERY SAME DAY [facility];  Service: Gynecology   • BLADDER SLING FEMALE N/A 2/22/2018    Procedure: BLADDER SLING FEMALE- TENSION FREE VAGINAL TAPE;  Surgeon: Angel Hernadez M.D.;  Location: SURGERY SAME DAY [facility];  Service: Gynecology   • BLADDER SLING FEMALE  10/17/2017    Procedure: BLADDER SLING FEMALE - TENSION FREE TAPE PROCEDURE;  Surgeon: Angel Hernadez M.D.;  Location: SURGERY SAME DAY [facility];  Service: Gynecology   • CYSTOSCOPY N/A 10/17/2017    Procedure: CYSTOSCOPY;  Surgeon: Angel Hernadez M.D.;  Location: SURGERY SAME DAY [facility];  Service: Gynecology   • ANTERIOR AND POSTERIOR REPAIR  10/17/2017    Procedure: ANTERIOR AND POSTERIOR REPAIR W/UPHOLD MESH;  Surgeon: Angel Hernadez M.D.;  Location: SURGERY SAME DAY [facility];  Service: Gynecology   • ENTEROCELE REPAIR  10/17/2017    Procedure: ENTEROCELE REPAIR - PERINEOPLASTY;  Surgeon: Angel Hernadez M.D.;  Location: SURGERY SAME DAY [facility];  Service: Gynecology   • VAGINAL SUSPENSION  10/17/2017    Procedure: VAGINAL SUSPENSION - SACROSPINOUS VAULT;  Surgeon: Angel Hernadez M.D.;  Location: SURGERY SAME DAY [facility];  Service: Gynecology   • BLADDER SLING FEMALE  4/11/2017    Procedure: BLADDER SLING FEMALE-TOT;  Surgeon: Angel Hernadez M.D.;  Location: SURGERY SAME DAY [facility];  Service:    • ANTERIOR AND POSTERIOR REPAIR  4/11/2017    Procedure: ANTERIOR AND POSTERIOR REPAIR;  Surgeon: Angel Hernadez M.D.;  Location: SURGERY SAME DAY [facility];  Service:    • ENTEROCELE REPAIR  4/11/2017    Procedure: ENTEROCELE REPAIR- PERINEOPLASTY;  Surgeon: Angel Hernadez M.D.;  Location: SURGERY SAME DAY [facility];  Service:    • VAGINAL SUSPENSION   4/11/2017    Procedure: VAGINAL SUSPENSION-SACROSPINOUS VAULT;  Surgeon: Angel Hernadez M.D.;  Location: SURGERY SAME DAY [facility];  Service:    • BLADDER SLING FEMALE  10/25/2016    Procedure: BLADDER SLING FEMALE TOT;  Surgeon: Angel Hernadez M.D.;  Location: SURGERY SAME DAY [facility];  Service:    • VAGINAL HYSTERECTOMY SCOPE TOTAL  10/25/2016    Procedure: VAGINAL HYSTERECTOMY SCOPE TOTAL;  Surgeon: Angel Hernadez M.D.;  Location: SURGERY SAME DAY [facility];  Service:    • SALPINGECTOMY Bilateral 10/25/2016    Procedure: SALPINGECTOMY;  Surgeon: Angel Hernadez M.D.;  Location: SURGERY SAME DAY [facility];  Service:    • ANTERIOR AND POSTERIOR REPAIR  10/25/2016    Procedure: ANTERIOR AND POSTERIOR REPAIR;  Surgeon: Angel Hernadez M.D.;  Location: SURGERY SAME DAY [facility];  Service:    • ENTEROCELE REPAIR  10/25/2016    Procedure: ENTEROCELE REPAIR, PERINEOPLASTY;  Surgeon: Angel Hernadez M.D.;  Location: SURGERY SAME DAY [facility];  Service:    • VAGINAL SUSPENSION  10/25/2016    Procedure: VAGINAL SUSPENSION SACROSPINOUS VAULT ;  Surgeon: Angel Hernadez M.D.;  Location: SURGERY SAME DAY [facility];  Service:    • OTHER      Tubal ligation     Family History   Problem Relation Age of Onset   • Diabetes Mother         pills   • Hypertension Mother    • Diabetes Father         insulin   • Diabetes Paternal Grandmother    • Diabetes Paternal Aunt      Social History     Socioeconomic History   • Marital status: Single     Spouse name: Not on file   • Number of children: Not on file   • Years of education: Not on file   • Highest education level: Not on file   Occupational History   • Not on file   Tobacco Use   • Smoking status: Never Smoker   • Smokeless tobacco: Never Used   Vaping Use   • Vaping Use: Never used   Substance and Sexual Activity   • Alcohol use: No   • Drug use: No   • Sexual activity: Yes     Partners: Male   Other Topics Concern   • Not on file   Social  History Narrative   • Not on file     Social Determinants of Health     Financial Resource Strain:    • Difficulty of Paying Living Expenses:    Food Insecurity:    • Worried About Running Out of Food in the Last Year:    • Ran Out of Food in the Last Year:    Transportation Needs:    • Lack of Transportation (Medical):    • Lack of Transportation (Non-Medical):    Physical Activity:    • Days of Exercise per Week:    • Minutes of Exercise per Session:    Stress:    • Feeling of Stress :    Social Connections:    • Frequency of Communication with Friends and Family:    • Frequency of Social Gatherings with Friends and Family:    • Attends [employer] Services:    • Active Member of Clubs or Organizations:    • Attends Club or Organization Meetings:    • Marital Status:    Intimate Partner Violence:    • Fear of Current or Ex-Partner:    • Emotionally Abused:    • Physically Abused:    • Sexually Abused:      Allergies   Allergen Reactions   • Latex      Condons, rash  Latex gloves, rash     Outpatient Encounter Medications as of 8/10/2021   Medication Sig Dispense Refill   • triamcinolone acetonide (KENALOG) 0.1 % Cream      • B-D ULTRAFINE III SHORT PEN      • chlorthalidone (HYGROTON) 25 MG Tab      • ammonium lactate (LAC-HYDRIN) 12 % Lotion      • cyclobenzaprine (FLEXERIL) 5 mg tablet Take 1 tablet by mouth 3 times a day as needed. 30 tablet 0   • metoprolol SR (TOPROL XL) 50 MG TABLET SR 24 HR Take 1 tablet by mouth every day. 90 tablet 3   • hydroCHLOROthiazide (HYDRODIURIL) 25 MG Tab Take 1 tablet by mouth every day. 90 tablet 3   • losartan (COZAAR) 100 MG Tab Take 1 tablet by mouth every day. 30 tablet 11   • atorvastatin (LIPITOR) 40 MG Tab Take 40 mg by mouth every day.     • famotidine (PEPCID) 20 MG Tab      • omeprazole (PRILOSEC) 20 MG delayed-release capsule Take 20 mg by mouth every day.     • Ascorbic Acid (VITAMIN C ER PO) Take  by mouth.     • Acetaminophen (TYLENOL PO) Take  by mouth.     •  "albuterol 108 (90 Base) MCG/ACT Aero Soln inhalation aerosol Inhale 2 Puffs every 6 hours as needed for Shortness of Breath.     • metformin (GLUCOPHAGE) 1000 MG tablet Take 1,000 mg by mouth 2 times a day, with meals.     • fluticasone (FLONASE) 50 MCG/ACT nasal spray Spray 1 Spray in nose every day.     • ONE TOUCH ULTRA TEST strip TEST BLOOD SUGAR QAM  1   • Insulin Glargine (BASAGLAR KWIKPEN) 100 UNIT/ML Solution Pen-injector      • ONETOUCH DELICA LANCETS 33G Misc U UTD QAM  5   • aspirin EC (ECOTRIN) 81 MG Tablet Delayed Response Take 81 mg by mouth every morning.       No facility-administered encounter medications on file as of 8/10/2021.     Review of Systems   Constitutional: Negative for chills and fever.   HENT: Negative for congestion.    Respiratory: Negative for cough and shortness of breath.    Cardiovascular: Negative for chest pain, palpitations, orthopnea, leg swelling and PND.   Gastrointestinal: Negative for abdominal pain and nausea.   Musculoskeletal: Negative for myalgias.   Skin: Negative for rash.   Neurological: Negative for dizziness, loss of consciousness and headaches.   Endo/Heme/Allergies: Does not bruise/bleed easily.   Psychiatric/Behavioral: The patient does not have insomnia.         Objective:   /82 (BP Location: Left arm, Patient Position: Sitting, BP Cuff Size: Adult)   Pulse 78   Resp 16   Ht 1.626 m (5' 4\")   Wt 95.3 kg (210 lb)   LMP 10/13/2016 (Exact Date)   SpO2 94%   BMI 36.05 kg/m²     Physical Exam   Constitutional: She is oriented to person, place, and time. She appears well-developed.   BMI 36.05 (weight is down slightly)   HENT:   Head: Normocephalic.   Neck: No JVD present.   Cardiovascular: Normal rate, regular rhythm and normal heart sounds.   Pulmonary/Chest: Effort normal and breath sounds normal. No respiratory distress. She has no wheezes. She has no rales.   Abdominal: Soft. Bowel sounds are normal. She exhibits no distension. There is no " abdominal tenderness.   Musculoskeletal:         General: Normal range of motion.      Cervical back: Normal range of motion and neck supple.   Neurological: She is alert and oriented to person, place, and time.   Skin: Skin is warm and dry. No rash noted.     BIOTEL MONITOR OF 3/11/2021:  Average HR 78bpm  Minimum HR 45bpm (at 4:26pm)  Maximum HR 129bpm  No AF events  No SVT  No bradycardia  No heart block  No pauses  No VT  Rare VE (<0.1% of total time)    NUCLEAR IMAGING INTERPRETATION OF MPI OF 2/15/2019:  No evidence of significant jeopardized viable myocardium or prior myocardial infarction.   Normal left ventricular size, ejection fraction, and wall motion.   ECG INTERPRETATION   Negative stress ECG for ischemia    Lab Results   Component Value Date/Time    SODIUM 140 05/04/2021 05:26 PM    POTASSIUM 3.9 05/04/2021 05:26 PM    CHLORIDE 100 05/04/2021 05:26 PM    CO2 26 05/04/2021 05:26 PM    GLUCOSE 169 (H) 05/04/2021 05:26 PM    BUN 10 05/04/2021 05:26 PM    CREATININE 0.57 05/04/2021 05:26 PM    CREATININE 0.6 05/27/2007 12:01 AM     Lab Results   Component Value Date/Time    WBC 8.6 05/04/2021 05:26 PM    RBC 4.99 05/04/2021 05:26 PM    HEMOGLOBIN 13.3 05/04/2021 05:26 PM    HEMATOCRIT 42.0 05/04/2021 05:26 PM    MCV 84.2 05/04/2021 05:26 PM    MCH 26.7 (L) 05/04/2021 05:26 PM    MCHC 31.7 (L) 05/04/2021 05:26 PM    MPV 10.9 05/04/2021 05:26 PM        Assessment:     1. Stable angina pectoris (HCC)     2. Palpitations     3. Essential hypertension, benign     4. Mixed hyperlipidemia     5. Type 2 diabetes mellitus without complication, with long-term current use of insulin (HCC)     6. Obstructive sleep apnea         Medical Decision Making:  Today's Assessment / Status / Plan:     1. History of chest pain, with recent ER visit in May 2021; EKG and troponins were negative/normal. No recurrence of symptoms.    2. History of palpitations, rare PACs/PVCs on Biotel in March 2021, well controlled with Toprol XL  50mg.    3. Hypertension, treated with BB, ARB and HCTZ. BP is good today.    4. Hyperlipidemia, treated with Lipitor 40mg.    5. Diabetes mellitus, treated, followed by PCP.    6. JAYLEEN, prescribed CPAP, but unable to tolerate. Urged her to discuss with PCP.    She is reassured regarding her workup in May 2021. Suggested same medications for now. To discuss CPAP with PCP. FU with me in 6 months, sooner if clinical condition changes.

## 2021-10-29 ENCOUNTER — APPOINTMENT (OUTPATIENT)
Dept: RADIOLOGY | Facility: MEDICAL CENTER | Age: 46
End: 2021-10-29
Attending: EMERGENCY MEDICINE
Payer: COMMERCIAL

## 2021-10-29 ENCOUNTER — HOSPITAL ENCOUNTER (EMERGENCY)
Facility: MEDICAL CENTER | Age: 46
End: 2021-10-29
Attending: EMERGENCY MEDICINE
Payer: COMMERCIAL

## 2021-10-29 VITALS
HEART RATE: 59 BPM | WEIGHT: 210.98 LBS | HEIGHT: 65 IN | RESPIRATION RATE: 16 BRPM | OXYGEN SATURATION: 97 % | SYSTOLIC BLOOD PRESSURE: 155 MMHG | BODY MASS INDEX: 35.15 KG/M2 | DIASTOLIC BLOOD PRESSURE: 72 MMHG | TEMPERATURE: 97 F

## 2021-10-29 DIAGNOSIS — I10 HYPERTENSION, UNSPECIFIED TYPE: ICD-10-CM

## 2021-10-29 DIAGNOSIS — R07.9 CHEST PAIN, UNSPECIFIED TYPE: ICD-10-CM

## 2021-10-29 LAB
ALBUMIN SERPL BCP-MCNC: 4.3 G/DL (ref 3.2–4.9)
ALBUMIN/GLOB SERPL: 1.3 G/DL
ALP SERPL-CCNC: 91 U/L (ref 30–99)
ALT SERPL-CCNC: 30 U/L (ref 2–50)
ANION GAP SERPL CALC-SCNC: 12 MMOL/L (ref 7–16)
AST SERPL-CCNC: 21 U/L (ref 12–45)
BASOPHILS # BLD AUTO: 0.4 % (ref 0–1.8)
BASOPHILS # BLD: 0.03 K/UL (ref 0–0.12)
BILIRUB SERPL-MCNC: 0.3 MG/DL (ref 0.1–1.5)
BUN SERPL-MCNC: 9 MG/DL (ref 8–22)
CALCIUM SERPL-MCNC: 9.9 MG/DL (ref 8.5–10.5)
CHLORIDE SERPL-SCNC: 104 MMOL/L (ref 96–112)
CO2 SERPL-SCNC: 26 MMOL/L (ref 20–33)
CREAT SERPL-MCNC: 0.52 MG/DL (ref 0.5–1.4)
EKG IMPRESSION: NORMAL
EOSINOPHIL # BLD AUTO: 0.11 K/UL (ref 0–0.51)
EOSINOPHIL NFR BLD: 1.5 % (ref 0–6.9)
ERYTHROCYTE [DISTWIDTH] IN BLOOD BY AUTOMATED COUNT: 39.4 FL (ref 35.9–50)
GLOBULIN SER CALC-MCNC: 3.3 G/DL (ref 1.9–3.5)
GLUCOSE SERPL-MCNC: 139 MG/DL (ref 65–99)
HCT VFR BLD AUTO: 39.5 % (ref 37–47)
HGB BLD-MCNC: 13.3 G/DL (ref 12–16)
IMM GRANULOCYTES # BLD AUTO: 0.02 K/UL (ref 0–0.11)
IMM GRANULOCYTES NFR BLD AUTO: 0.3 % (ref 0–0.9)
LYMPHOCYTES # BLD AUTO: 2.61 K/UL (ref 1–4.8)
LYMPHOCYTES NFR BLD: 36.1 % (ref 22–41)
MCH RBC QN AUTO: 28 PG (ref 27–33)
MCHC RBC AUTO-ENTMCNC: 33.7 G/DL (ref 33.6–35)
MCV RBC AUTO: 83.2 FL (ref 81.4–97.8)
MONOCYTES # BLD AUTO: 0.29 K/UL (ref 0–0.85)
MONOCYTES NFR BLD AUTO: 4 % (ref 0–13.4)
NEUTROPHILS # BLD AUTO: 4.17 K/UL (ref 2–7.15)
NEUTROPHILS NFR BLD: 57.7 % (ref 44–72)
NRBC # BLD AUTO: 0 K/UL
NRBC BLD-RTO: 0 /100 WBC
PLATELET # BLD AUTO: 227 K/UL (ref 164–446)
PMV BLD AUTO: 10.9 FL (ref 9–12.9)
POTASSIUM SERPL-SCNC: 3.5 MMOL/L (ref 3.6–5.5)
PROT SERPL-MCNC: 7.6 G/DL (ref 6–8.2)
RBC # BLD AUTO: 4.75 M/UL (ref 4.2–5.4)
SODIUM SERPL-SCNC: 142 MMOL/L (ref 135–145)
TROPONIN T SERPL-MCNC: <6 NG/L (ref 6–19)
TROPONIN T SERPL-MCNC: <6 NG/L (ref 6–19)
WBC # BLD AUTO: 7.2 K/UL (ref 4.8–10.8)

## 2021-10-29 PROCEDURE — 99285 EMERGENCY DEPT VISIT HI MDM: CPT

## 2021-10-29 PROCEDURE — 93005 ELECTROCARDIOGRAM TRACING: CPT | Performed by: EMERGENCY MEDICINE

## 2021-10-29 PROCEDURE — 85025 COMPLETE CBC W/AUTO DIFF WBC: CPT

## 2021-10-29 PROCEDURE — 96372 THER/PROPH/DIAG INJ SC/IM: CPT

## 2021-10-29 PROCEDURE — 93005 ELECTROCARDIOGRAM TRACING: CPT

## 2021-10-29 PROCEDURE — A9270 NON-COVERED ITEM OR SERVICE: HCPCS | Performed by: EMERGENCY MEDICINE

## 2021-10-29 PROCEDURE — 700111 HCHG RX REV CODE 636 W/ 250 OVERRIDE (IP): Performed by: EMERGENCY MEDICINE

## 2021-10-29 PROCEDURE — 700102 HCHG RX REV CODE 250 W/ 637 OVERRIDE(OP): Performed by: EMERGENCY MEDICINE

## 2021-10-29 PROCEDURE — 84484 ASSAY OF TROPONIN QUANT: CPT | Mod: 91

## 2021-10-29 PROCEDURE — 80053 COMPREHEN METABOLIC PANEL: CPT

## 2021-10-29 PROCEDURE — 36415 COLL VENOUS BLD VENIPUNCTURE: CPT

## 2021-10-29 PROCEDURE — 71045 X-RAY EXAM CHEST 1 VIEW: CPT

## 2021-10-29 RX ORDER — KETOROLAC TROMETHAMINE 30 MG/ML
30 INJECTION, SOLUTION INTRAMUSCULAR; INTRAVENOUS ONCE
Status: COMPLETED | OUTPATIENT
Start: 2021-10-29 | End: 2021-10-29

## 2021-10-29 RX ORDER — KETOROLAC TROMETHAMINE 30 MG/ML
30 INJECTION, SOLUTION INTRAMUSCULAR; INTRAVENOUS ONCE
Status: DISCONTINUED | OUTPATIENT
Start: 2021-10-29 | End: 2021-10-29

## 2021-10-29 RX ORDER — OMEPRAZOLE 20 MG/1
20 CAPSULE, DELAYED RELEASE ORAL DAILY
Qty: 30 CAPSULE | Refills: 0 | Status: SHIPPED | OUTPATIENT
Start: 2021-10-29

## 2021-10-29 RX ADMIN — LIDOCAINE HYDROCHLORIDE 30 ML: 20 SOLUTION OROPHARYNGEAL at 13:49

## 2021-10-29 RX ADMIN — KETOROLAC TROMETHAMINE 30 MG: 30 INJECTION, SOLUTION INTRAMUSCULAR at 13:50

## 2021-10-29 ASSESSMENT — PAIN SCALES - WONG BAKER: WONGBAKER_NUMERICALRESPONSE: HURTS JUST A LITTLE BIT

## 2021-10-29 ASSESSMENT — PAIN DESCRIPTION - PAIN TYPE: TYPE: ACUTE PAIN

## 2021-10-29 ASSESSMENT — FIBROSIS 4 INDEX: FIB4 SCORE: 0.89

## 2021-10-29 NOTE — ED TRIAGE NOTES
"Chief Complaint   Patient presents with   • Chest Pain     8/10 in middle of chest that started 6 days ago. Pt describes pain as constant and painful with inhalation.    • Headache   • Shortness of Breath     Pt ambulated to triage for above complaint.  Pt is AO x 4, follows commands, and responds appropriately to questions. Patient's breathing is unlabored but reports SOB and pain is currently 8/10 on the 0-10 pain scale. Pt also reports mid back pain. Bilaterally BP taken: Right (166/99)   Left (172/90.)     Pt has history of HTN and DM and has not taken medications this morning. Pt was vaccinated for COVID in March 2021.     Pt placed in [facility] . Patient educated on triage process and encouraged to alert staff for any changes.    BP (!) 199/112   Pulse 68   Temp 35.8 °C (96.5 °F) (Temporal)   Resp 18   Ht 1.651 m (5' 5\")   Wt 95.7 kg (210 lb 15.7 oz)   SpO2 95%     "

## 2021-10-29 NOTE — ED NOTES
Pt asking to take prescribed medications, metformin and losartan. Pt took prescribed medications.

## 2021-10-29 NOTE — DISCHARGE INSTRUCTIONS
You were seen in the ER for chest pain.  Thankfully, your labs and imaging did not reveal an acute abnormality that requires further work-up, consultation, or admission to the hospital.  I would like you to follow-up with your primary care physician as well as your cardiologist.  I am giving you a prescription for Prilosec, take it as directed.  Return immediately to the ER with new or worsening symptoms.  Good luck, I hope you feel better soon!

## 2021-10-29 NOTE — ED PROVIDER NOTES
ED Provider Note    CHIEF COMPLAINT  Chief Complaint   Patient presents with   • Chest Pain     8/10 in middle of chest that started 6 days ago. Pt describes pain as constant and painful with inhalation.    • Headache   • Shortness of Breath       HPI  Tanya Barrow is a 46 y.o. female who presents with a chief complaint of substernal, nonradiating chest pain that started 6 days ago.  The pain is constant and worse with taking a deep breath.  She notes that she developed a nonproductive cough 2 weeks ago but has not had any fevers.  She has had some associated nausea but no vomiting.  She also complains of a mild headache but is not the worst headache she has ever experienced.  She denies any exogenous hormone use.  She has no vision or speech difficulties.  She denies any weakness or numbness in her extremities.    ACS risk factors: No history of MI, HTN, HLD, DM, tobacco use, amphetamine/cocaine use.  Patient does have a history of hypertension, hyperlipidemia, diabetes, and elevated BMI.  Her father  at the age of 51 due to an MI.    ACS features: Not exertional, associated shortness of breath, no diaphoresis    PE risk factors: No history of DVT/PE, pleuritic component, hemoptysis, unilateral leg swelling/pain, recent travel/immobilization, recent surgery, or exogenous hormone use    Aortic dissection features: No neuro symptoms, does not radiate to the back, no ripping/tearing pain    No IVDA, fevers/recent illness, or positional component.    REVIEW OF SYSTEMS  See HPI for further details.  Chest pain.  Shortness of breath.  Headache.  All other systems are negative.     PAST MEDICAL HISTORY   has a past medical history of Anemia, Blood clotting disorder ([facility]), Chest pain (2019), Diabetes, GERD (gastroesophageal reflux disease), Heart burn, Hemorrhagic disorder ([facility]), Hyperlipidemia, Hypertension (), Obesity, Palpitations, Urinary bladder disorder, and Urinary incontinence.    SOCIAL  HISTORY  Social History     Tobacco Use   • Smoking status: Never Smoker   • Smokeless tobacco: Never Used   Vaping Use   • Vaping Use: Never used   Substance and Sexual Activity   • Alcohol use: No   • Drug use: No   • Sexual activity: Yes     Partners: Male       SURGICAL HISTORY   has a past surgical history that includes other; bladder sling female (10/25/2016); bladder sling female (4/11/2017); bladder sling female (10/17/2017); cystoscopy (N/A, 10/17/2017); vaginal hysterectomy scope total (10/25/2016); salpingectomy (Bilateral, 10/25/2016); anterior and posterior repair (10/25/2016); enterocele repair (10/25/2016); vaginal suspension (10/25/2016); anterior and posterior repair (4/11/2017); enterocele repair (4/11/2017); vaginal suspension (4/11/2017); anterior and posterior repair (10/17/2017); enterocele repair (10/17/2017); vaginal suspension (10/17/2017); anterior and posterior repair (2/22/2018); enterocele repair (2/22/2018); vaginal suspension (N/A, 2/22/2018); bladder sling female (N/A, 2/22/2018); and cholecystectomy robotic xi (12/9/2020).    CURRENT MEDICATIONS  Home Medications     Reviewed by Ariadna Calabrese R.N. (Registered Nurse) on 10/29/21 at 0747  Med List Status: Partial   Medication Last Dose Status   Acetaminophen (TYLENOL PO)  Active   albuterol 108 (90 Base) MCG/ACT Aero Soln inhalation aerosol  Active   ammonium lactate (LAC-HYDRIN) 12 % Lotion  Active   Ascorbic Acid (VITAMIN C ER PO)  Active   aspirin EC (ECOTRIN) 81 MG Tablet Delayed Response  Active   atorvastatin (LIPITOR) 40 MG Tab  Active   B-D ULTRAFINE III SHORT PEN  Active   chlorthalidone (HYGROTON) 25 MG Tab  Active   cyclobenzaprine (FLEXERIL) 5 mg tablet  Active   famotidine (PEPCID) 20 MG Tab  Active   fluticasone (FLONASE) 50 MCG/ACT nasal spray  Active   hydroCHLOROthiazide (HYDRODIURIL) 25 MG Tab  Active   Insulin Glargine (BASAGLAR KWIKPEN) 100 UNIT/ML Solution Pen-injector  Active   losartan (COZAAR) 100 MG Tab   "Active   metformin (GLUCOPHAGE) 1000 MG tablet  Active   metoprolol SR (TOPROL XL) 50 MG TABLET SR 24 HR  Active   omeprazole (PRILOSEC) 20 MG delayed-release capsule  Active   ONE TOUCH ULTRA TEST strip  Active   ONETOUCH DELICA LANCETS 33G Misc  Active   triamcinolone acetonide (KENALOG) 0.1 % Cream  Active                ALLERGIES  Allergies   Allergen Reactions   • Latex      Condons, rash  Latex gloves, rash       PHYSICAL EXAM  VITAL SIGNS: /99   Pulse 62   Temp 36.1 °C (96.9 °F) (Temporal)   Resp 18   Ht 1.651 m (5' 5\")   Wt 95.7 kg (210 lb 15.7 oz)   LMP 10/13/2016 (Exact Date)   SpO2 97%   BMI 35.11 kg/m²    Pulse ox interpretation: I interpret this pulse ox as normal.  Constitutional: Alert in no apparent distress.  HENT: No signs of trauma, Bilateral external ears normal, Nose normal. Moist mucous membranes.  Eyes: Pupils are equal and reactive, Conjunctiva normal, Non-icteric.   Neck: Normal range of motion, No tenderness, Supple, No stridor.   Lymphatic: No lymphadenopathy noted.   Cardiovascular: Regular rate and rhythm, no murmurs. Pulses symmetrical.  Thorax & Lungs: Normal breath sounds, No respiratory distress, No wheezing, No chest tenderness.   Abdomen: Bowel sounds normal, Soft, No tenderness, No masses, No pulsatile masses. No peritoneal signs.  Skin: Warm, Dry, No erythema, No rash.   Back: Normal alignment.  Extremities: No cyanosis.  Musculoskeletal: No major deformities noted.   Neurologic: Alert, no focal deficits noted.   Psychiatric: Affect normal, Judgment normal, Mood normal.     DIAGNOSTIC STUDIES / PROCEDURES    LABS  Results for orders placed or performed during the hospital encounter of 10/29/21   CBC with Differential   Result Value Ref Range    WBC 7.2 4.8 - 10.8 K/uL    RBC 4.75 4.20 - 5.40 M/uL    Hemoglobin 13.3 12.0 - 16.0 g/dL    Hematocrit 39.5 37.0 - 47.0 %    MCV 83.2 81.4 - 97.8 fL    MCH 28.0 27.0 - 33.0 pg    MCHC 33.7 33.6 - 35.0 g/dL    RDW 39.4 35.9 - " 50.0 fL    Platelet Count 227 164 - 446 K/uL    MPV 10.9 9.0 - 12.9 fL    Neutrophils-Polys 57.70 44.00 - 72.00 %    Lymphocytes 36.10 22.00 - 41.00 %    Monocytes 4.00 0.00 - 13.40 %    Eosinophils 1.50 0.00 - 6.90 %    Basophils 0.40 0.00 - 1.80 %    Immature Granulocytes 0.30 0.00 - 0.90 %    Nucleated RBC 0.00 /100 WBC    Neutrophils (Absolute) 4.17 2.00 - 7.15 K/uL    Lymphs (Absolute) 2.61 1.00 - 4.80 K/uL    Monos (Absolute) 0.29 0.00 - 0.85 K/uL    Eos (Absolute) 0.11 0.00 - 0.51 K/uL    Baso (Absolute) 0.03 0.00 - 0.12 K/uL    Immature Granulocytes (abs) 0.02 0.00 - 0.11 K/uL    NRBC (Absolute) 0.00 K/uL   Complete Metabolic Panel (CMP)   Result Value Ref Range    Sodium 142 135 - 145 mmol/L    Potassium 3.5 (L) 3.6 - 5.5 mmol/L    Chloride 104 96 - 112 mmol/L    Co2 26 20 - 33 mmol/L    Anion Gap 12.0 7.0 - 16.0    Glucose 139 (H) 65 - 99 mg/dL    Bun 9 8 - 22 mg/dL    Creatinine 0.52 0.50 - 1.40 mg/dL    Calcium 9.9 8.5 - 10.5 mg/dL    AST(SGOT) 21 12 - 45 U/L    ALT(SGPT) 30 2 - 50 U/L    Alkaline Phosphatase 91 30 - 99 U/L    Total Bilirubin 0.3 0.1 - 1.5 mg/dL    Albumin 4.3 3.2 - 4.9 g/dL    Total Protein 7.6 6.0 - 8.2 g/dL    Globulin 3.3 1.9 - 3.5 g/dL    A-G Ratio 1.3 g/dL   Troponin   Result Value Ref Range    Troponin T <6 6 - 19 ng/L   ESTIMATED GFR   Result Value Ref Range    GFR If African American >60 >60 mL/min/1.73 m 2    GFR If Non African American >60 >60 mL/min/1.73 m 2   TROPONIN   Result Value Ref Range    Troponin T <6 6 - 19 ng/L   EKG   Result Value Ref Range    Report       [facility] Emergency Dept.    Test Date:  2021-10-29  Pt Name:    ALIX CARMEN Department: ER  MRN:        [id number]                      Room:  Gender:     Female                       Technician: [id number]  :        [id number]                   Requested By:ER TRIAGE PROTOCOL  Order #:    [id number]                    Reading MD: Christian Gutierrez MD    Measurements  Intervals                                 Axis  Rate:       61                           P:          55  GA:         160                          QRS:        17  QRSD:       94                           T:          14  QT:         404  QTc:        407    Interpretive Statements  SINUS RHYTHM  Unchanged from prior  Compared to ECG 05/04/2021 17:12:58  Myocardial infarct finding no longer present  Electronically Signed On 10- 15:01:27 PDT by Christian Gutierrez MD       RADIOLOGY  DX-CHEST-PORTABLE (1 VIEW)   Final Result      No acute cardiac or pulmonary abnormalities are identified.          COURSE & MEDICAL DECISION MAKING  Pertinent Labs & Imaging studies reviewed. (See chart for details)  Records obtained and reviewed: Patient was most recently seen in the emergency department 5/4/2021 with a complaint of chest pain.  She is noted to have a history of diabetes, hyperlipidemia, and hypertension.  The pain radiated into her throat.  She noted that she had a stress test 1 to 2 years prior that was negative and was being followed by cardiology for palpitations.  She complained of lightheadedness and left hand numbness.  She had no shortness of breath, diaphoresis, or vomiting but did have some mild nausea.  Patient was given a GI cocktail and Toradol.  Labs were unremarkable.  She had a heart score of 2-3.  She was discharged to follow-up with cardiology and given a prescription for Flexeril.    This is a bhanu 46 year old female here with non-radiating, substernal, pleuritic chest pain and headache. DDx includes, but is not limited to, ACS, MSK, GERD, PE, PNA, viral process, pleurisy, aortic dissection, hypertensive urgency/emergency, pericardial effusion/tamponade, pericarditis, myocarditis.    Arrives AF with normal VS. Appears well hydrated and non-toxic. Regarding the patient's headache, it is not the worst headache of her life, she has no associated neurologic symptoms including visual difficulties, weakness, or numbness.  There was no thunderclap onset. Doubt SAH. She has no photophobia or phonophobia, nausea to suggest migraine headache. She does not take exogenous hormones, low suspicion for venous sinus thrombus. Low suspicion for neoplasm.    Remainder of physical exam is reassuring. No abdominal or chest wall tenderness. No lower extremity edema.    The patient was ruled out for PE by PERC criteria:    AGE < 50  No estrogens, BCPs, recent surgery (4 weeks)  No hx of: DVT/PE or hemoptysis  No unilateral leg swelling  Pulse Ox > 94% and HR <100    HEART score: 3    All four extremities are warm and well perfused. She has equal radial pulses. CXR without acute abnormality, no widened mediastinum; low suspicion for aortic process.    Given a dose of toradol as well as a GI cocktail and upon reevaluation, symptoms have completely resolved.    CBC is without leukocytosis. Metabolic panel demonstrates mild hypokalemia that does not require repletion in the ED. Blood glucose 139. Troponin x2 are negative.    Patient has a low heart score suggesting low risk of major adverse cardiac events in the next six weeks. She is established with cardiology already and I have recommended that she follow up as an outpatient. I do not think she will benefit from hospitalization for provocative testing as her symptoms are very atypical of ACS. She is safe for d/c with close outpatient follow up and I gave her a prescription for Prilosec. Her vital signs are normal and stable. She was given very strict return precautions and discharged in good and stable condition.    The patient will not drink alcohol nor drive with prescribed medications. The patient will return for worsening symptoms and is stable at the time of discharge. The patient verbalizes understanding and will comply.    FINAL IMPRESSION  1. Chest pain, unspecified type  omeprazole (PRILOSEC) 20 MG delayed-release capsule   2. Hypertension, unspecified type           Electronically signed  by: Christian Gutierrez M.D., 10/29/2021 1:22 PM

## 2021-10-29 NOTE — ED NOTES
Pt .Patient discharged in stable condition per orders. Wristband removed per protocol. Patient verbalized understanding of all discharge instructions. All belongings accounted for.

## 2022-02-14 ENCOUNTER — APPOINTMENT (OUTPATIENT)
Dept: CARDIOLOGY | Facility: MEDICAL CENTER | Age: 47
End: 2022-02-14
Payer: COMMERCIAL

## 2022-09-20 ENCOUNTER — APPOINTMENT (OUTPATIENT)
Dept: RADIOLOGY | Facility: MEDICAL CENTER | Age: 47
End: 2022-09-20
Attending: EMERGENCY MEDICINE
Payer: MEDICAID

## 2022-09-20 ENCOUNTER — HOSPITAL ENCOUNTER (OUTPATIENT)
Facility: MEDICAL CENTER | Age: 47
End: 2022-09-22
Attending: EMERGENCY MEDICINE | Admitting: STUDENT IN AN ORGANIZED HEALTH CARE EDUCATION/TRAINING PROGRAM
Payer: MEDICAID

## 2022-09-20 DIAGNOSIS — G45.9 TIA (TRANSIENT ISCHEMIC ATTACK): ICD-10-CM

## 2022-09-20 DIAGNOSIS — R07.9 CHEST PAIN, UNSPECIFIED TYPE: ICD-10-CM

## 2022-09-20 DIAGNOSIS — R29.90 STROKE-LIKE SYMPTOM: ICD-10-CM

## 2022-09-20 PROBLEM — E66.811 CLASS 1 OBESITY IN ADULT: Status: ACTIVE | Noted: 2022-09-20

## 2022-09-20 PROBLEM — K21.9 GERD (GASTROESOPHAGEAL REFLUX DISEASE): Status: ACTIVE | Noted: 2022-09-20

## 2022-09-20 PROBLEM — E66.9 CLASS 1 OBESITY IN ADULT: Status: ACTIVE | Noted: 2022-09-20

## 2022-09-20 LAB
ALBUMIN SERPL BCP-MCNC: 4.2 G/DL (ref 3.2–4.9)
ALBUMIN/GLOB SERPL: 1.4 G/DL
ALP SERPL-CCNC: 94 U/L (ref 30–99)
ALT SERPL-CCNC: 18 U/L (ref 2–50)
ANION GAP SERPL CALC-SCNC: 10 MMOL/L (ref 7–16)
AST SERPL-CCNC: 18 U/L (ref 12–45)
BASOPHILS # BLD AUTO: 0.6 % (ref 0–1.8)
BASOPHILS # BLD: 0.06 K/UL (ref 0–0.12)
BILIRUB SERPL-MCNC: 0.2 MG/DL (ref 0.1–1.5)
BUN SERPL-MCNC: 12 MG/DL (ref 8–22)
CALCIUM SERPL-MCNC: 8.9 MG/DL (ref 8.5–10.5)
CHLORIDE SERPL-SCNC: 99 MMOL/L (ref 96–112)
CO2 SERPL-SCNC: 29 MMOL/L (ref 20–33)
CREAT SERPL-MCNC: 0.74 MG/DL (ref 0.5–1.4)
EKG IMPRESSION: NORMAL
EOSINOPHIL # BLD AUTO: 0.14 K/UL (ref 0–0.51)
EOSINOPHIL NFR BLD: 1.4 % (ref 0–6.9)
ERYTHROCYTE [DISTWIDTH] IN BLOOD BY AUTOMATED COUNT: 40.4 FL (ref 35.9–50)
GFR SERPLBLD CREATININE-BSD FMLA CKD-EPI: 99 ML/MIN/1.73 M 2
GLOBULIN SER CALC-MCNC: 3.1 G/DL (ref 1.9–3.5)
GLUCOSE SERPL-MCNC: 106 MG/DL (ref 65–99)
HCT VFR BLD AUTO: 38.3 % (ref 37–47)
HGB BLD-MCNC: 12.8 G/DL (ref 12–16)
IMM GRANULOCYTES # BLD AUTO: 0.02 K/UL (ref 0–0.11)
IMM GRANULOCYTES NFR BLD AUTO: 0.2 % (ref 0–0.9)
LYMPHOCYTES # BLD AUTO: 4.06 K/UL (ref 1–4.8)
LYMPHOCYTES NFR BLD: 40.1 % (ref 22–41)
MCH RBC QN AUTO: 27.6 PG (ref 27–33)
MCHC RBC AUTO-ENTMCNC: 33.4 G/DL (ref 33.6–35)
MCV RBC AUTO: 82.7 FL (ref 81.4–97.8)
MONOCYTES # BLD AUTO: 0.53 K/UL (ref 0–0.85)
MONOCYTES NFR BLD AUTO: 5.2 % (ref 0–13.4)
NEUTROPHILS # BLD AUTO: 5.32 K/UL (ref 2–7.15)
NEUTROPHILS NFR BLD: 52.5 % (ref 44–72)
NRBC # BLD AUTO: 0 K/UL
NRBC BLD-RTO: 0 /100 WBC
PLATELET # BLD AUTO: 243 K/UL (ref 164–446)
PMV BLD AUTO: 11.5 FL (ref 9–12.9)
POTASSIUM SERPL-SCNC: 3.4 MMOL/L (ref 3.6–5.5)
PROT SERPL-MCNC: 7.3 G/DL (ref 6–8.2)
RBC # BLD AUTO: 4.63 M/UL (ref 4.2–5.4)
SODIUM SERPL-SCNC: 138 MMOL/L (ref 135–145)
TROPONIN T SERPL-MCNC: <6 NG/L (ref 6–19)
WBC # BLD AUTO: 10.1 K/UL (ref 4.8–10.8)

## 2022-09-20 PROCEDURE — 71045 X-RAY EXAM CHEST 1 VIEW: CPT

## 2022-09-20 PROCEDURE — 83036 HEMOGLOBIN GLYCOSYLATED A1C: CPT

## 2022-09-20 PROCEDURE — 84484 ASSAY OF TROPONIN QUANT: CPT

## 2022-09-20 PROCEDURE — 99220 PR INITIAL OBSERVATION CARE,LEVL III: CPT | Performed by: STUDENT IN AN ORGANIZED HEALTH CARE EDUCATION/TRAINING PROGRAM

## 2022-09-20 PROCEDURE — 85025 COMPLETE CBC W/AUTO DIFF WBC: CPT

## 2022-09-20 PROCEDURE — 36415 COLL VENOUS BLD VENIPUNCTURE: CPT

## 2022-09-20 PROCEDURE — 99285 EMERGENCY DEPT VISIT HI MDM: CPT

## 2022-09-20 PROCEDURE — G0378 HOSPITAL OBSERVATION PER HR: HCPCS

## 2022-09-20 PROCEDURE — 80053 COMPREHEN METABOLIC PANEL: CPT

## 2022-09-20 PROCEDURE — 70450 CT HEAD/BRAIN W/O DYE: CPT

## 2022-09-20 PROCEDURE — 93005 ELECTROCARDIOGRAM TRACING: CPT | Performed by: EMERGENCY MEDICINE

## 2022-09-20 RX ORDER — ASPIRIN 81 MG/1
324 TABLET, CHEWABLE ORAL DAILY
Status: DISCONTINUED | OUTPATIENT
Start: 2022-09-21 | End: 2022-09-20

## 2022-09-20 RX ORDER — BISACODYL 10 MG
10 SUPPOSITORY, RECTAL RECTAL
Status: DISCONTINUED | OUTPATIENT
Start: 2022-09-20 | End: 2022-09-22 | Stop reason: HOSPADM

## 2022-09-20 RX ORDER — MORPHINE SULFATE 4 MG/ML
2-4 INJECTION INTRAVENOUS
Status: DISCONTINUED | OUTPATIENT
Start: 2022-09-20 | End: 2022-09-22 | Stop reason: HOSPADM

## 2022-09-20 RX ORDER — ASPIRIN 325 MG
325 TABLET ORAL ONCE
Status: COMPLETED | OUTPATIENT
Start: 2022-09-21 | End: 2022-09-21

## 2022-09-20 RX ORDER — ASPIRIN 325 MG
325 TABLET ORAL DAILY
Status: DISCONTINUED | OUTPATIENT
Start: 2022-09-21 | End: 2022-09-20

## 2022-09-20 RX ORDER — ONDANSETRON 2 MG/ML
4 INJECTION INTRAMUSCULAR; INTRAVENOUS EVERY 4 HOURS PRN
Status: DISCONTINUED | OUTPATIENT
Start: 2022-09-20 | End: 2022-09-22 | Stop reason: HOSPADM

## 2022-09-20 RX ORDER — AMOXICILLIN 250 MG
2 CAPSULE ORAL 2 TIMES DAILY
Status: DISCONTINUED | OUTPATIENT
Start: 2022-09-21 | End: 2022-09-22 | Stop reason: HOSPADM

## 2022-09-20 RX ORDER — PROCHLORPERAZINE EDISYLATE 5 MG/ML
5-10 INJECTION INTRAMUSCULAR; INTRAVENOUS EVERY 4 HOURS PRN
Status: DISCONTINUED | OUTPATIENT
Start: 2022-09-20 | End: 2022-09-22 | Stop reason: HOSPADM

## 2022-09-20 RX ORDER — OMEPRAZOLE 20 MG/1
20 CAPSULE, DELAYED RELEASE ORAL DAILY
Status: DISCONTINUED | OUTPATIENT
Start: 2022-09-21 | End: 2022-09-22 | Stop reason: HOSPADM

## 2022-09-20 RX ORDER — ACETAMINOPHEN 325 MG/1
650 TABLET ORAL EVERY 6 HOURS PRN
Status: DISCONTINUED | OUTPATIENT
Start: 2022-09-20 | End: 2022-09-22 | Stop reason: HOSPADM

## 2022-09-20 RX ORDER — POLYETHYLENE GLYCOL 3350 17 G/17G
1 POWDER, FOR SOLUTION ORAL
Status: DISCONTINUED | OUTPATIENT
Start: 2022-09-20 | End: 2022-09-22 | Stop reason: HOSPADM

## 2022-09-20 RX ORDER — ASPIRIN 81 MG/1
81 TABLET, CHEWABLE ORAL DAILY
Status: DISCONTINUED | OUTPATIENT
Start: 2022-09-21 | End: 2022-09-22 | Stop reason: HOSPADM

## 2022-09-20 RX ORDER — ASPIRIN 300 MG/1
300 SUPPOSITORY RECTAL DAILY
Status: DISCONTINUED | OUTPATIENT
Start: 2022-09-21 | End: 2022-09-20

## 2022-09-20 RX ORDER — PROMETHAZINE HYDROCHLORIDE 25 MG/1
12.5-25 TABLET ORAL EVERY 4 HOURS PRN
Status: DISCONTINUED | OUTPATIENT
Start: 2022-09-20 | End: 2022-09-22 | Stop reason: HOSPADM

## 2022-09-20 RX ORDER — PROMETHAZINE HYDROCHLORIDE 25 MG/1
12.5-25 SUPPOSITORY RECTAL EVERY 4 HOURS PRN
Status: DISCONTINUED | OUTPATIENT
Start: 2022-09-20 | End: 2022-09-22 | Stop reason: HOSPADM

## 2022-09-20 RX ORDER — ONDANSETRON 4 MG/1
4 TABLET, ORALLY DISINTEGRATING ORAL EVERY 4 HOURS PRN
Status: DISCONTINUED | OUTPATIENT
Start: 2022-09-20 | End: 2022-09-22 | Stop reason: HOSPADM

## 2022-09-20 RX ORDER — NITROGLYCERIN 0.4 MG/1
0.4 TABLET SUBLINGUAL
Status: DISCONTINUED | OUTPATIENT
Start: 2022-09-20 | End: 2022-09-22 | Stop reason: HOSPADM

## 2022-09-20 RX ORDER — ATORVASTATIN CALCIUM 40 MG/1
40 TABLET, FILM COATED ORAL EVERY EVENING
Status: DISCONTINUED | OUTPATIENT
Start: 2022-09-21 | End: 2022-09-21

## 2022-09-20 ASSESSMENT — ENCOUNTER SYMPTOMS
COUGH: 0
SHORTNESS OF BREATH: 0
FLANK PAIN: 0
MYALGIAS: 0
BRUISES/BLEEDS EASILY: 0
BLURRED VISION: 0
SEIZURES: 0
DIZZINESS: 0
NAUSEA: 0
ABDOMINAL PAIN: 0
HEARTBURN: 0
DEPRESSION: 0
DOUBLE VISION: 0
HEADACHES: 0
TINGLING: 1
CHILLS: 0
FOCAL WEAKNESS: 0
FEVER: 0
SPEECH CHANGE: 0
SENSORY CHANGE: 1
VOMITING: 0

## 2022-09-20 ASSESSMENT — FIBROSIS 4 INDEX: FIB4 SCORE: 0.83

## 2022-09-20 ASSESSMENT — LIFESTYLE VARIABLES: SUBSTANCE_ABUSE: 0

## 2022-09-21 ENCOUNTER — APPOINTMENT (OUTPATIENT)
Dept: CARDIOLOGY | Facility: MEDICAL CENTER | Age: 47
End: 2022-09-21
Attending: STUDENT IN AN ORGANIZED HEALTH CARE EDUCATION/TRAINING PROGRAM
Payer: MEDICAID

## 2022-09-21 ENCOUNTER — APPOINTMENT (OUTPATIENT)
Dept: RADIOLOGY | Facility: MEDICAL CENTER | Age: 47
End: 2022-09-21
Attending: INTERNAL MEDICINE
Payer: MEDICAID

## 2022-09-21 ENCOUNTER — APPOINTMENT (OUTPATIENT)
Dept: RADIOLOGY | Facility: MEDICAL CENTER | Age: 47
End: 2022-09-21
Attending: STUDENT IN AN ORGANIZED HEALTH CARE EDUCATION/TRAINING PROGRAM
Payer: MEDICAID

## 2022-09-21 LAB
ALBUMIN SERPL BCP-MCNC: 3.8 G/DL (ref 3.2–4.9)
AMPHET UR QL SCN: NEGATIVE
BARBITURATES UR QL SCN: NEGATIVE
BASOPHILS # BLD AUTO: 0.5 % (ref 0–1.8)
BASOPHILS # BLD: 0.04 K/UL (ref 0–0.12)
BENZODIAZ UR QL SCN: NEGATIVE
BUN SERPL-MCNC: 11 MG/DL (ref 8–22)
BZE UR QL SCN: NEGATIVE
CALCIUM SERPL-MCNC: 9.1 MG/DL (ref 8.5–10.5)
CANNABINOIDS UR QL SCN: NEGATIVE
CHLORIDE SERPL-SCNC: 102 MMOL/L (ref 96–112)
CHOLEST SERPL-MCNC: 120 MG/DL (ref 100–199)
CO2 SERPL-SCNC: 27 MMOL/L (ref 20–33)
CREAT SERPL-MCNC: 0.64 MG/DL (ref 0.5–1.4)
D DIMER PPP IA.FEU-MCNC: 0.29 UG/ML (FEU) (ref 0–0.5)
EKG IMPRESSION: NORMAL
EOSINOPHIL # BLD AUTO: 0.1 K/UL (ref 0–0.51)
EOSINOPHIL NFR BLD: 1.3 % (ref 0–6.9)
ERYTHROCYTE [DISTWIDTH] IN BLOOD BY AUTOMATED COUNT: 40.3 FL (ref 35.9–50)
EST. AVERAGE GLUCOSE BLD GHB EST-MCNC: 171 MG/DL
GFR SERPLBLD CREATININE-BSD FMLA CKD-EPI: 108 ML/MIN/1.73 M 2
GLUCOSE BLD STRIP.AUTO-MCNC: 109 MG/DL (ref 65–99)
GLUCOSE BLD STRIP.AUTO-MCNC: 119 MG/DL (ref 65–99)
GLUCOSE BLD STRIP.AUTO-MCNC: 177 MG/DL (ref 65–99)
GLUCOSE BLD STRIP.AUTO-MCNC: 218 MG/DL (ref 65–99)
GLUCOSE SERPL-MCNC: 133 MG/DL (ref 65–99)
HBA1C MFR BLD: 7.6 % (ref 4–5.6)
HCT VFR BLD AUTO: 39.9 % (ref 37–47)
HDLC SERPL-MCNC: 46 MG/DL
HGB BLD-MCNC: 12.9 G/DL (ref 12–16)
IMM GRANULOCYTES # BLD AUTO: 0.03 K/UL (ref 0–0.11)
IMM GRANULOCYTES NFR BLD AUTO: 0.4 % (ref 0–0.9)
LDLC SERPL CALC-MCNC: 47 MG/DL
LV EJECT FRACT  99904: 62
LV EJECT FRACT MOD 2C 99903: 63.57
LV EJECT FRACT MOD 4C 99902: 58.81
LV EJECT FRACT MOD BP 99901: 62.05
LYMPHOCYTES # BLD AUTO: 3.1 K/UL (ref 1–4.8)
LYMPHOCYTES NFR BLD: 41 % (ref 22–41)
MAGNESIUM SERPL-MCNC: 2.1 MG/DL (ref 1.5–2.5)
MCH RBC QN AUTO: 26.9 PG (ref 27–33)
MCHC RBC AUTO-ENTMCNC: 32.3 G/DL (ref 33.6–35)
MCV RBC AUTO: 83.1 FL (ref 81.4–97.8)
METHADONE UR QL SCN: NEGATIVE
MONOCYTES # BLD AUTO: 0.37 K/UL (ref 0–0.85)
MONOCYTES NFR BLD AUTO: 4.9 % (ref 0–13.4)
NEUTROPHILS # BLD AUTO: 3.92 K/UL (ref 2–7.15)
NEUTROPHILS NFR BLD: 51.9 % (ref 44–72)
NRBC # BLD AUTO: 0 K/UL
NRBC BLD-RTO: 0 /100 WBC
OPIATES UR QL SCN: NEGATIVE
OXYCODONE UR QL SCN: NEGATIVE
PCP UR QL SCN: NEGATIVE
PHOSPHATE SERPL-MCNC: 3.9 MG/DL (ref 2.5–4.5)
PLATELET # BLD AUTO: 234 K/UL (ref 164–446)
PMV BLD AUTO: 11.1 FL (ref 9–12.9)
POTASSIUM SERPL-SCNC: 3.5 MMOL/L (ref 3.6–5.5)
PROPOXYPH UR QL SCN: NEGATIVE
RBC # BLD AUTO: 4.8 M/UL (ref 4.2–5.4)
SODIUM SERPL-SCNC: 140 MMOL/L (ref 135–145)
TRIGL SERPL-MCNC: 134 MG/DL (ref 0–149)
TROPONIN T SERPL-MCNC: <6 NG/L (ref 6–19)
WBC # BLD AUTO: 7.6 K/UL (ref 4.8–10.8)

## 2022-09-21 PROCEDURE — 96372 THER/PROPH/DIAG INJ SC/IM: CPT

## 2022-09-21 PROCEDURE — A9270 NON-COVERED ITEM OR SERVICE: HCPCS | Performed by: INTERNAL MEDICINE

## 2022-09-21 PROCEDURE — 85379 FIBRIN DEGRADATION QUANT: CPT

## 2022-09-21 PROCEDURE — 36415 COLL VENOUS BLD VENIPUNCTURE: CPT

## 2022-09-21 PROCEDURE — 93010 ELECTROCARDIOGRAM REPORT: CPT | Performed by: INTERNAL MEDICINE

## 2022-09-21 PROCEDURE — 93005 ELECTROCARDIOGRAM TRACING: CPT | Mod: XE | Performed by: STUDENT IN AN ORGANIZED HEALTH CARE EDUCATION/TRAINING PROGRAM

## 2022-09-21 PROCEDURE — G0378 HOSPITAL OBSERVATION PER HR: HCPCS

## 2022-09-21 PROCEDURE — 84484 ASSAY OF TROPONIN QUANT: CPT

## 2022-09-21 PROCEDURE — 93306 TTE W/DOPPLER COMPLETE: CPT

## 2022-09-21 PROCEDURE — A9502 TC99M TETROFOSMIN: HCPCS

## 2022-09-21 PROCEDURE — 700102 HCHG RX REV CODE 250 W/ 637 OVERRIDE(OP): Performed by: INTERNAL MEDICINE

## 2022-09-21 PROCEDURE — 700111 HCHG RX REV CODE 636 W/ 250 OVERRIDE (IP)

## 2022-09-21 PROCEDURE — 85025 COMPLETE CBC W/AUTO DIFF WBC: CPT

## 2022-09-21 PROCEDURE — 700102 HCHG RX REV CODE 250 W/ 637 OVERRIDE(OP): Performed by: STUDENT IN AN ORGANIZED HEALTH CARE EDUCATION/TRAINING PROGRAM

## 2022-09-21 PROCEDURE — A9270 NON-COVERED ITEM OR SERVICE: HCPCS | Performed by: STUDENT IN AN ORGANIZED HEALTH CARE EDUCATION/TRAINING PROGRAM

## 2022-09-21 PROCEDURE — 93306 TTE W/DOPPLER COMPLETE: CPT | Mod: 26 | Performed by: INTERNAL MEDICINE

## 2022-09-21 PROCEDURE — 99226 PR SUBSEQUENT OBSERVATION CARE,LEVEL III: CPT | Performed by: INTERNAL MEDICINE

## 2022-09-21 PROCEDURE — 83735 ASSAY OF MAGNESIUM: CPT

## 2022-09-21 PROCEDURE — 80307 DRUG TEST PRSMV CHEM ANLYZR: CPT

## 2022-09-21 PROCEDURE — 82962 GLUCOSE BLOOD TEST: CPT | Mod: 91

## 2022-09-21 PROCEDURE — 80061 LIPID PANEL: CPT

## 2022-09-21 PROCEDURE — 80069 RENAL FUNCTION PANEL: CPT

## 2022-09-21 PROCEDURE — 70551 MRI BRAIN STEM W/O DYE: CPT

## 2022-09-21 RX ORDER — ATORVASTATIN CALCIUM 20 MG/1
20 TABLET, FILM COATED ORAL EVERY EVENING
Status: DISCONTINUED | OUTPATIENT
Start: 2022-09-21 | End: 2022-09-22 | Stop reason: HOSPADM

## 2022-09-21 RX ORDER — POTASSIUM CHLORIDE 20 MEQ/1
40 TABLET, EXTENDED RELEASE ORAL ONCE
Status: COMPLETED | OUTPATIENT
Start: 2022-09-21 | End: 2022-09-21

## 2022-09-21 RX ORDER — AMINOPHYLLINE 25 MG/ML
100 INJECTION, SOLUTION INTRAVENOUS
Status: DISCONTINUED | OUTPATIENT
Start: 2022-09-21 | End: 2022-09-22 | Stop reason: HOSPADM

## 2022-09-21 RX ORDER — REGADENOSON 0.08 MG/ML
INJECTION, SOLUTION INTRAVENOUS
Status: COMPLETED
Start: 2022-09-21 | End: 2022-09-21

## 2022-09-21 RX ORDER — REGADENOSON 0.08 MG/ML
0.4 INJECTION, SOLUTION INTRAVENOUS ONCE
Status: COMPLETED | OUTPATIENT
Start: 2022-09-21 | End: 2022-09-21

## 2022-09-21 RX ORDER — DEXTROSE MONOHYDRATE 25 G/50ML
25 INJECTION, SOLUTION INTRAVENOUS
Status: DISCONTINUED | OUTPATIENT
Start: 2022-09-21 | End: 2022-09-21

## 2022-09-21 RX ADMIN — REGADENOSON 0.4 MG: 0.08 INJECTION, SOLUTION INTRAVENOUS at 14:32

## 2022-09-21 RX ADMIN — INSULIN GLARGINE-YFGN 15 UNITS: 100 INJECTION, SOLUTION SUBCUTANEOUS at 17:32

## 2022-09-21 RX ADMIN — ASPIRIN 81 MG 81 MG: 81 TABLET ORAL at 05:11

## 2022-09-21 RX ADMIN — OMEPRAZOLE 20 MG: 20 CAPSULE, DELAYED RELEASE ORAL at 05:11

## 2022-09-21 RX ADMIN — ACETAMINOPHEN 650 MG: 325 TABLET, FILM COATED ORAL at 11:53

## 2022-09-21 RX ADMIN — ACETAMINOPHEN 650 MG: 325 TABLET, FILM COATED ORAL at 20:20

## 2022-09-21 RX ADMIN — NITROGLYCERIN 0.4 MG: 0.4 TABLET, ORALLY DISINTEGRATING SUBLINGUAL at 07:30

## 2022-09-21 RX ADMIN — ATORVASTATIN CALCIUM 20 MG: 20 TABLET, FILM COATED ORAL at 17:32

## 2022-09-21 RX ADMIN — ASPIRIN 325 MG: 325 TABLET ORAL at 01:07

## 2022-09-21 RX ADMIN — NITROGLYCERIN 0.4 MG: 0.4 TABLET, ORALLY DISINTEGRATING SUBLINGUAL at 07:21

## 2022-09-21 RX ADMIN — INSULIN HUMAN 3 UNITS: 100 INJECTION, SOLUTION PARENTERAL at 17:32

## 2022-09-21 RX ADMIN — POTASSIUM CHLORIDE 40 MEQ: 1500 TABLET, EXTENDED RELEASE ORAL at 11:53

## 2022-09-21 RX ADMIN — INSULIN HUMAN 2 UNITS: 100 INJECTION, SOLUTION PARENTERAL at 20:20

## 2022-09-21 ASSESSMENT — COGNITIVE AND FUNCTIONAL STATUS - GENERAL
SUGGESTED CMS G CODE MODIFIER MOBILITY: CH
SUGGESTED CMS G CODE MODIFIER DAILY ACTIVITY: CH
MOBILITY SCORE: 24
DAILY ACTIVITIY SCORE: 24

## 2022-09-21 ASSESSMENT — ENCOUNTER SYMPTOMS
NECK PAIN: 0
HEMOPTYSIS: 0
ORTHOPNEA: 0
CLAUDICATION: 0
COUGH: 0
DIZZINESS: 0
SPEECH CHANGE: 0
WEIGHT LOSS: 0
SHORTNESS OF BREATH: 1
ABDOMINAL PAIN: 0
FEVER: 0
WEAKNESS: 0
MYALGIAS: 0
FOCAL WEAKNESS: 0
CHILLS: 0
VOMITING: 0
NAUSEA: 0
BLURRED VISION: 0
DOUBLE VISION: 0
DIARRHEA: 0
PHOTOPHOBIA: 0
CONSTIPATION: 0
PALPITATIONS: 0
SENSORY CHANGE: 1

## 2022-09-21 ASSESSMENT — LIFESTYLE VARIABLES
TOTAL SCORE: 0
HAVE YOU EVER FELT YOU SHOULD CUT DOWN ON YOUR DRINKING: NO
TOTAL SCORE: 0
HAVE PEOPLE ANNOYED YOU BY CRITICIZING YOUR DRINKING: NO
AVERAGE NUMBER OF DAYS PER WEEK YOU HAVE A DRINK CONTAINING ALCOHOL: 0
CONSUMPTION TOTAL: NEGATIVE
TOTAL SCORE: 0
EVER HAD A DRINK FIRST THING IN THE MORNING TO STEADY YOUR NERVES TO GET RID OF A HANGOVER: NO
HOW MANY TIMES IN THE PAST YEAR HAVE YOU HAD 5 OR MORE DRINKS IN A DAY: 0
EVER FELT BAD OR GUILTY ABOUT YOUR DRINKING: NO
ALCOHOL_USE: NO
ON A TYPICAL DAY WHEN YOU DRINK ALCOHOL HOW MANY DRINKS DO YOU HAVE: 0

## 2022-09-21 ASSESSMENT — PAIN DESCRIPTION - PAIN TYPE
TYPE: ACUTE PAIN

## 2022-09-21 ASSESSMENT — FIBROSIS 4 INDEX: FIB4 SCORE: 0.86

## 2022-09-21 ASSESSMENT — PATIENT HEALTH QUESTIONNAIRE - PHQ9
SUM OF ALL RESPONSES TO PHQ9 QUESTIONS 1 AND 2: 0
1. LITTLE INTEREST OR PLEASURE IN DOING THINGS: NOT AT ALL
2. FEELING DOWN, DEPRESSED, IRRITABLE, OR HOPELESS: NOT AT ALL

## 2022-09-21 NOTE — CARE PLAN
The patient is Watcher - Medium risk of patient condition declining or worsening    Shift Goals  Clinical Goals: Monitor neuro status  Patient Goals: Get MRI  Family Goals: MARCIA    Progress made toward(s) clinical / shift goals:     Problem: Knowledge Deficit - Stroke Education  Goal: Patient's knowledge of stroke and risk factors will improve  Outcome: Progressing  Pt educated on stroke symptoms/causes.     Problem: Neuro Status  Goal: Neuro status will remain stable or improve  Outcome: Progressing  Pt still has decreased sensation and numbness/tingling to the left face, LUE/LLE.       Patient is not progressing towards the following goals: N/A

## 2022-09-21 NOTE — PROGRESS NOTES
4 Eyes Skin Assessment Completed by JAZMIN Bella and JAZMIN Wright.    Head WDL  Ears WDL  Nose WDL  Mouth WDL  Neck WDL  Breast/Chest WDL  Shoulder Blades WDL  Spine WDL  (R) Arm/Elbow/Hand WDL  (L) Arm/Elbow/Hand WDL  Abdomen WDL  Groin WDL  Scrotum/Coccyx/Buttocks WDL  (R) Leg WDL  (L) Leg WDL  (R) Heel/Foot/Toe WDL  (L) Heel/Foot/Toe WDL          Devices In Places Tele Box, Blood Pressure Cuff, and Pulse Ox      Interventions In Place Pillows and Pressure Redistribution Mattress    Possible Skin Injury No    Pictures Uploaded Into Epic N/A  Wound Consult Placed N/A  RN Wound Prevention Protocol Ordered No

## 2022-09-21 NOTE — PROGRESS NOTES
Checked with nurs early am and near lunch time with pt NPO for stress test. Swallow colton Vasquez as approp.

## 2022-09-21 NOTE — THERAPY
Missed Therapy     Patient Name: Tanya Barrow  Age:  47 y.o., Sex:  female  Medical Record #: [id number]  Today's Date: 9/21/2022    Discussed missed therapy with Kaitlin       09/21/22 0354   Interdisciplinary Plan of Care Collaboration   Collaboration Comments OT eval attempted, Nsg reports pt is currently having chest pain& will be going down for a stress test.  Nsg also stated pt does not have any deficits & OT eval may not be warranted, Nsg to clarify with MD to see if OT order should be canceled.

## 2022-09-21 NOTE — ASSESSMENT & PLAN NOTE
A1c 7.6  Encouraged the patient for healthy diet and losing weight  Continue home medication Lantus and holding metformin

## 2022-09-21 NOTE — PROGRESS NOTES
Assumed care of pt at 0700. Pt alert and oriented x4. NIH a 1 d/t mild sensation differences on the left face, LUE and LLE. Strength is equal bilaterally. PERRLA. Pt did c/o 8/10 chest pain with inspiration while doing morning assessment. Dr. Gross (listed hospitalist) is not logged in yet but PRN Nitro given and STAT EKG ordered per Nitro protocol. Vital signs are stable. Charge RN La Nena made aware. Bed low and locked, alarm set and call bell within reach. Hourly rounding continues.    0730: Chest pain decreased to a 6/10; VSS; second dose of nitro given. EKG tech at bedside.    0737: Chest pain has resolved. VSS. EKG showed sinus rhythm with borderline T abnormalities in the inferior leads. Dr. Gross is still not logged in (it appears he is the wrong assigned hospitalist - he admitted the pt yesterday). Charge RN figuring out who is the correct hospitalist and Rapid RN Eliot, notified in the mean time.    0749: Per La Nena, Dr. Mandel is the hospitalist. MD messaged via Voalte regarding the above.    0757: Dr. Mandel stated Dr. Doe is the hospitalist. Dr. Doe messaged via Voalte. MD stated he will see pt soon.    1524: Pt arrived back from [place] with elevated BP (171/92). She is no longer having chest pain or left sided deficits. Dr. Doe notified about BP since there are no PRNs ordered; no new orders received.

## 2022-09-21 NOTE — ED NOTES
Med rec updated and complete. Allergies reviewed. Confirmed name and date of birth . Pt denies antibiotic use in last 30 days.      [place] [facility] [phone]

## 2022-09-21 NOTE — THERAPY
Missed Therapy     Patient Name: Tanya Barrow  Age:  47 y.o., Sex:  female  Medical Record #: [id number]  Today's Date: 9/21/2022 09/21/22 1652   Interdisciplinary Plan of Care Collaboration   Collaboration Comments Attempted to initiate PT eval; however, pt initially off the floor for stress test (results negative). Per RN, pt with resolution of paresthesias and no complaints; however, awaiting MRI to r/o stroke and no plans to D/C today. Will defer eval until completion of MRI to determine TIA vs stroke,  and indication/requirement for formal evaluation.

## 2022-09-21 NOTE — ASSESSMENT & PLAN NOTE
Atypical chest pain  Normal EKG and troponin for 3 times  Less likely to be ischemia however since patient has risk of coronary artery disease and family history, will order stress test to rule out any ischemia.  Continue telemetry, aspirin and atorvastatin

## 2022-09-21 NOTE — ED PROVIDER NOTES
ED Provider Note    CHIEF COMPLAINT  Chief Complaint   Patient presents with    Numbness     L sided face and arm numbness since 5pm today  Dizziness occurring at the same time, feels like room is spinning, endorses nausea   Strengh equal bilat, decreased sensation L face and L arm, no obvious facial droop or slurring    Chest Pain     Central chest pain starting this morning, pressure like, subsided throughout the day   Currently denies chest pain       HPI  Tanya Barrow is a 47 y.o. female here for evaluation of left-sided chest pain.  The patient states that she has had intermittent left-sided chest pain since yesterday, but states that it stays in the left side, does not radiate to the arm, neck or back.  She is not taking anything prior to or for the same.  She has no fever chills or vomiting.  Patient states that she also has been having intermittent left face and left arm paresthesias.  She has no weakness, no fall, and no anticoagulant use.  She states that this is been ongoing since around 4:00 today, but may have been earlier.  She has no history of the same.  She denies any trouble walking.    ROS;  Please see HPI  O/W negative     Past medical history   has a past medical history of Anemia, Blood clotting disorder (HCC), Chest pain (02/2019), Diabetes, GERD (gastroesophageal reflux disease), Heart burn, Hemorrhagic disorder (HCC), Hyperlipidemia, Hypertension (2006), Obesity, Palpitations, Urinary bladder disorder, and Urinary incontinence.    SOCIAL HISTORY  Social History     Tobacco Use    Smoking status: Never    Smokeless tobacco: Never   Vaping Use    Vaping Use: Never used   Substance and Sexual Activity    Alcohol use: No    Drug use: No    Sexual activity: Yes     Partners: Male       SURGICAL HISTORY   has a past surgical history that includes other; bladder sling female (10/25/2016); bladder sling female (4/11/2017); bladder sling female (10/17/2017); cystoscopy (N/A,  "10/17/2017); vaginal hysterectomy scope total (10/25/2016); salpingectomy (Bilateral, 10/25/2016); anterior and posterior repair (10/25/2016); enterocele repair (10/25/2016); vaginal suspension (10/25/2016); anterior and posterior repair (4/11/2017); enterocele repair (4/11/2017); vaginal suspension (4/11/2017); anterior and posterior repair (10/17/2017); enterocele repair (10/17/2017); vaginal suspension (10/17/2017); anterior and posterior repair (2/22/2018); enterocele repair (2/22/2018); vaginal suspension (N/A, 2/22/2018); bladder sling female (N/A, 2/22/2018); and cholecystectomy robotic xi (12/9/2020).    CURRENT MEDICATIONS  Home Medications       Reviewed by David Calderon R.N. (Registered Nurse) on 09/20/22 at 2016  Med List Status: Not Addressed     Medication Last Dose Status   albuterol 108 (90 Base) MCG/ACT Aero Soln inhalation aerosol  Active   Ascorbic Acid (VITAMIN C ER PO)  Active   aspirin EC (ECOTRIN) 81 MG Tablet Delayed Response  Active   atorvastatin (LIPITOR) 40 MG Tab  Active   B-D ULTRAFINE III SHORT PEN  Active   chlorthalidone (HYGROTON) 25 MG Tab  Active   fluticasone (FLONASE) 50 MCG/ACT nasal spray  Active   Insulin Glargine (BASAGLAR KWIKPEN) 100 UNIT/ML Solution Pen-injector  Active   losartan (COZAAR) 100 MG Tab  Active   metformin (GLUCOPHAGE) 1000 MG tablet  Active   metoprolol SR (TOPROL XL) 50 MG TABLET SR 24 HR  Active   omeprazole (PRILOSEC) 20 MG delayed-release capsule  Active   ONE TOUCH ULTRA TEST strip  Active   ONETOUCH DELICA LANCETS 33G Misc  Active                    ALLERGIES  Allergies   Allergen Reactions    Latex Rash     Condons, rash  Latex gloves, rash       REVIEW OF SYSTEMS  See HPI for further details. Review of systems as above, otherwise all other systems are negative.     PHYSICAL EXAM  VITAL SIGNS: BP (!) 181/89   Pulse (!) 58   Temp 36.1 °C (97 °F) (Temporal)   Resp 16   Ht 1.651 m (5' 5\")   Wt 94.5 kg (208 lb 5.4 oz)   LMP 10/13/2016 (Exact Date)  "  SpO2 99%   BMI 34.67 kg/m²     Constitutional: Well developed, well nourished. No acute distress.  HEENT: Normocephalic, atraumatic. MMM  Neck: Supple, Full range of motion   Chest/Pulmonary:  No respiratory distress.  Equal expansion   Musculoskeletal: No deformity, no edema, neurovascular intact.   Neuro: Clear speech, appropriate, cooperative, cranial nerves II-XII grossly intact.  Equal , dorsi plantarflex extension 5 out of 5 lower extremities.  Good finger-nose.  Psych: Normal mood and affect      PROCEDURES     MEDICAL RECORD  I have reviewed patient's medical record and pertinent results are listed.    COURSE & MEDICAL DECISION MAKING  I have reviewed any medical record information, laboratory studies and radiographic results as noted above.    Results for orders placed or performed during the hospital encounter of 09/20/22   CBC w/ Differential   Result Value Ref Range    WBC 10.1 4.8 - 10.8 K/uL    RBC 4.63 4.20 - 5.40 M/uL    Hemoglobin 12.8 12.0 - 16.0 g/dL    Hematocrit 38.3 37.0 - 47.0 %    MCV 82.7 81.4 - 97.8 fL    MCH 27.6 27.0 - 33.0 pg    MCHC 33.4 (L) 33.6 - 35.0 g/dL    RDW 40.4 35.9 - 50.0 fL    Platelet Count 243 164 - 446 K/uL    MPV 11.5 9.0 - 12.9 fL    Neutrophils-Polys 52.50 44.00 - 72.00 %    Lymphocytes 40.10 22.00 - 41.00 %    Monocytes 5.20 0.00 - 13.40 %    Eosinophils 1.40 0.00 - 6.90 %    Basophils 0.60 0.00 - 1.80 %    Immature Granulocytes 0.20 0.00 - 0.90 %    Nucleated RBC 0.00 /100 WBC    Neutrophils (Absolute) 5.32 2.00 - 7.15 K/uL    Lymphs (Absolute) 4.06 1.00 - 4.80 K/uL    Monos (Absolute) 0.53 0.00 - 0.85 K/uL    Eos (Absolute) 0.14 0.00 - 0.51 K/uL    Baso (Absolute) 0.06 0.00 - 0.12 K/uL    Immature Granulocytes (abs) 0.02 0.00 - 0.11 K/uL    NRBC (Absolute) 0.00 K/uL   Complete Metabolic Panel (CMP)   Result Value Ref Range    Sodium 138 135 - 145 mmol/L    Potassium 3.4 (L) 3.6 - 5.5 mmol/L    Chloride 99 96 - 112 mmol/L    Co2 29 20 - 33 mmol/L    Anion Gap  10.0 7.0 - 16.0    Glucose 106 (H) 65 - 99 mg/dL    Bun 12 8 - 22 mg/dL    Creatinine 0.74 0.50 - 1.40 mg/dL    Calcium 8.9 8.5 - 10.5 mg/dL    AST(SGOT) 18 12 - 45 U/L    ALT(SGPT) 18 2 - 50 U/L    Alkaline Phosphatase 94 30 - 99 U/L    Total Bilirubin 0.2 0.1 - 1.5 mg/dL    Albumin 4.2 3.2 - 4.9 g/dL    Total Protein 7.3 6.0 - 8.2 g/dL    Globulin 3.1 1.9 - 3.5 g/dL    A-G Ratio 1.4 g/dL   Troponin STAT   Result Value Ref Range    Troponin T <6 6 - 19 ng/L   ESTIMATED GFR   Result Value Ref Range    GFR (CKD-EPI) 99 >60 mL/min/1.73 m 2   EKG   Result Value Ref Range    Report       [facility] Emergency Dept.    Test Date:  2022  Pt Name:    ALIX REYES SUMEET SLATER Department: ER  MRN:        [id number]                      Room:       [facility]  Gender:     Female                       Technician: [id number]  :        [id number]                   Requested By:MARCELO VICTORIA  Order #:    [id number]                    Reading MD:    Measurements  Intervals                                Axis  Rate:       56                           P:          48  IL:         157                          QRS:        8  QRSD:       105                          T:          8  QT:         455  QTc:        440    Interpretive Statements  Sinus bradycardia  Baseline wander in lead(s) V6  Compared to ECG 10/29/2021 08:04:26  Sinus rhythm no longer present       CT-HEAD W/O   Final Result      Head CT without contrast within normal limits. No evidence of acute cerebral infarction, hemorrhage or mass lesion.         DX-CHEST-PORTABLE (1 VIEW)   Final Result      No acute cardiopulmonary disease.        Ekg;  sinus olivia at 56.  No st elevation, no st depression, qtc 440.  Compared to ekg from 10/29/21.       The pt will be admitted to the hospitalist service.       FINAL IMPRESSION  1. Stroke-like symptom        2. Chest pain, unspecified type              Electronically signed by: Marcelo Victoria D.O., 2022  9:19 PM

## 2022-09-21 NOTE — CARE PLAN
The patient is Stable - Low risk of patient condition declining or worsening    Shift Goals  Clinical Goals: Monitor neuro status  Patient Goals: Get MRI  Family Goals: MARCIA    Progress made toward(s) clinical / shift goals: Assumed care of patient at 0050. Patient is A&Ox4, and Q4hr neuro checks are being completed. NIHSS = 1, only pertinent for sensory loss. L facial, LUE and LLE neuropathy present. Bed is low and locked, frame alarm is on, call light is within reach, hourly rounding continues.     Problem: Neuro Status  Goal: Neuro status will remain stable or improve  Outcome: Progressing     Problem: Knowledge Deficit - Stroke Education  Goal: Patient's knowledge of stroke and risk factors will improve  Outcome: Progressing    Patient is not progressing towards the following goals:N/A

## 2022-09-21 NOTE — H&P
Hospital Medicine History & Physical Note    Date of Service  9/20/2022    Primary Care Physician  SUSANNA Arzate.    Consultants  None    Code Status  Full Code    Chief Complaint  Chief Complaint   Patient presents with    Numbness     L sided face and arm numbness since 5pm today  Dizziness occurring at the same time, feels like room is spinning, endorses nausea   Strengh equal bilat, decreased sensation L face and L arm, no obvious facial droop or slurring    Chest Pain     Central chest pain starting this morning, pressure like, subsided throughout the day   Currently denies chest pain       History of Presenting Illness  Tanya Barrow is a 47 y.o. obese female with history of diabetes, hyperlipidemia who presented 9/20/2022 with complaint of chest pain, LUE numbness.  Patient reported having symptoms approximately  5 PM, upon waking up.  She described sharp chest discomfort associated with LUE paresthesia.  Symptom has resolved at time of ER arrival.  No acute etiology seen on CT head, troponin WNL, EKG nonspecific ST changes.  Admitted to medicine service for observation.    I discussed the plan of care with patient and bedside RN.    Review of Systems  Review of Systems   Constitutional:  Negative for chills and fever.   HENT:  Negative for hearing loss and tinnitus.    Eyes:  Negative for blurred vision and double vision.   Respiratory:  Negative for cough and shortness of breath.    Cardiovascular:  Positive for chest pain. Negative for leg swelling.   Gastrointestinal:  Negative for abdominal pain, heartburn, nausea and vomiting.   Genitourinary:  Negative for dysuria and flank pain.   Musculoskeletal:  Negative for joint pain and myalgias.   Skin:  Negative for itching and rash.   Neurological:  Positive for tingling and sensory change. Negative for dizziness, speech change, focal weakness, seizures and headaches.   Endo/Heme/Allergies:  Negative for environmental allergies. Does  not bruise/bleed easily.   Psychiatric/Behavioral:  Negative for depression and substance abuse.    All other systems reviewed and are negative.    Past Medical History   has a past medical history of Anemia, Blood clotting disorder (HCC), Chest pain (02/2019), Diabetes, GERD (gastroesophageal reflux disease), Heart burn, Hemorrhagic disorder (HCC), Hyperlipidemia, Hypertension (2006), Obesity, Palpitations, Urinary bladder disorder, and Urinary incontinence.    Surgical History   has a past surgical history that includes other; bladder sling female (10/25/2016); bladder sling female (4/11/2017); bladder sling female (10/17/2017); cystoscopy (N/A, 10/17/2017); vaginal hysterectomy scope total (10/25/2016); salpingectomy (Bilateral, 10/25/2016); anterior and posterior repair (10/25/2016); enterocele repair (10/25/2016); vaginal suspension (10/25/2016); anterior and posterior repair (4/11/2017); enterocele repair (4/11/2017); vaginal suspension (4/11/2017); anterior and posterior repair (10/17/2017); enterocele repair (10/17/2017); vaginal suspension (10/17/2017); anterior and posterior repair (2/22/2018); enterocele repair (2/22/2018); vaginal suspension (N/A, 2/22/2018); bladder sling female (N/A, 2/22/2018); and cholecystectomy robotic xi (12/9/2020).     Family History  family history includes Diabetes in her father, mother, paternal aunt, and paternal grandmother; Hypertension in her mother.   Family history reviewed with patient. There is family history that is pertinent to the chief complaint.   FH of CAD in father    Social History   reports that she has never smoked. She has never used smokeless tobacco. She reports that she does not drink alcohol and does not use drugs.    Allergies  Allergies   Allergen Reactions    Latex Rash     Condons, rash  Latex gloves, rash       Medications  Prior to Admission Medications   Prescriptions Last Dose Informant Patient Reported? Taking?   Insulin Glargine (BASAGLAR  KWIKPEN) 100 UNIT/ML Solution Pen-injector 9/19/2022 at 1930 Patient Yes No   Sig: Inject 35 Units under the skin every evening.   aspirin EC (ECOTRIN) 81 MG Tablet Delayed Response 9/20/2022 at 0800 Patient Yes No   Sig: Take 81 mg by mouth every morning.   atorvastatin (LIPITOR) 40 MG Tab 9/19/2022 at 1930 Patient Yes No   Sig: Take 40 mg by mouth every evening.   chlorthalidone (HYGROTON) 25 MG Tab 9/20/2022 at 0800 Patient Yes No   Sig: Take 25 mg by mouth every day.   losartan (COZAAR) 100 MG Tab 9/20/2022 at 0800 Patient No No   Sig: Take 1 tablet by mouth every day.   metformin (GLUCOPHAGE) 1000 MG tablet 9/20/2022 at 0800 Patient Yes No   Sig: Take 1,000 mg by mouth 2 times a day, with meals.   metoprolol SR (TOPROL XL) 50 MG TABLET SR 24 HR 9/20/2022 at 0800 Patient No No   Sig: Take 1 tablet by mouth every day.   omeprazole (PRILOSEC) 20 MG delayed-release capsule 9/20/2022 at 0800 Patient No No   Sig: Take 1 Capsule by mouth every day.      Facility-Administered Medications: None       Physical Exam  Temp:  [36.1 °C (97 °F)] 36.1 °C (97 °F)  Pulse:  [52-58] 52  Resp:  [13-18] 18  BP: (147-185)/(68-89) 147/70  SpO2:  [92 %-99 %] 92 %  Blood Pressure: (!) 147/70   Temperature: 36.1 °C (97 °F)   Pulse: (!) 52   Respiration: 18   Pulse Oximetry: 92 %       Physical Exam  Vitals and nursing note reviewed.   Constitutional:       General: She is not in acute distress.     Appearance: She is obese.   HENT:      Head: Normocephalic and atraumatic.      Nose: Nose normal.      Mouth/Throat:      Mouth: Mucous membranes are moist.      Pharynx: Oropharynx is clear.   Eyes:      General: No scleral icterus.     Extraocular Movements: Extraocular movements intact.   Cardiovascular:      Rate and Rhythm: Normal rate and regular rhythm.      Pulses: Normal pulses.      Heart sounds:     No friction rub.   Pulmonary:      Effort: No respiratory distress.      Breath sounds: No stridor. No wheezing or rales.   Abdominal:       General: Bowel sounds are normal. There is distension.      Palpations: Abdomen is soft.      Tenderness: There is no abdominal tenderness. There is no guarding or rebound.   Musculoskeletal:         General: No tenderness. Normal range of motion.      Cervical back: Neck supple. No tenderness.   Skin:     General: Skin is warm and dry.      Capillary Refill: Capillary refill takes less than 2 seconds.   Neurological:      General: No focal deficit present.      Mental Status: She is alert and oriented to person, place, and time.   Psychiatric:         Mood and Affect: Mood normal.       Laboratory:  Recent Labs     09/20/22 2050   WBC 10.1   RBC 4.63   HEMOGLOBIN 12.8   HEMATOCRIT 38.3   MCV 82.7   MCH 27.6   MCHC 33.4*   RDW 40.4   PLATELETCT 243   MPV 11.5     Recent Labs     09/20/22 2050   SODIUM 138   POTASSIUM 3.4*   CHLORIDE 99   CO2 29   GLUCOSE 106*   BUN 12   CREATININE 0.74   CALCIUM 8.9     Recent Labs     09/20/22 2050   ALTSGPT 18   ASTSGOT 18   ALKPHOSPHAT 94   TBILIRUBIN 0.2   GLUCOSE 106*         No results for input(s): NTPROBNP in the last 72 hours.      Recent Labs     09/20/22 2050   TROPONINT <6       Imaging:  CT-HEAD W/O   Final Result      Head CT without contrast within normal limits. No evidence of acute cerebral infarction, hemorrhage or mass lesion.         DX-CHEST-PORTABLE (1 VIEW)   Final Result      No acute cardiopulmonary disease.      MR-BRAIN-W/O    (Results Pending)   EC-ECHOCARDIOGRAM COMPLETE W/O CONT    (Results Pending)       X-Ray:  I have personally reviewed the images and compared with prior images.  EKG:  I have personally reviewed the images and compared with prior images.    Assessment/Plan:  Justification for Admission Status  I anticipate this patient is appropriate for observation status at this time.      * TIA (transient ischemic attack)- (present on admission)  Assessment & Plan  Suspected  No acute etiology seen CT head  Permissive HTN  24-72hrs  ASA/statin  Follow-up echo, MRI brain    Chest pain- (present on admission)  Assessment & Plan  Trend CE, EKG  ASA/statin  PRN nitro SL, morphine  F/u echo    HLD (hyperlipidemia)  Assessment & Plan  statin    Type 2 diabetes mellitus without complication, with long-term current use of insulin (HCC)- (present on admission)  Assessment & Plan  ISS    HTN (hypertension)  Assessment & Plan  Resume anti-HTN meds when appropriate    GERD (gastroesophageal reflux disease)  Assessment & Plan  PPI    Class 1 obesity in adult  Assessment & Plan  Diet and lifestyle modification  Body mass index is 34.67 kg/m².          VTE prophylaxis: SCDs/TEDs

## 2022-09-21 NOTE — PROGRESS NOTES
Hospital Medicine Daily Progress Note    Date of Service  9/21/2022    Chief Complaint  Tayna Barrow is a 47 y.o. female admitted 9/20/2022 with left-sided numbness.     Hospital Course    47-year-old female with history of obesity diabetes and dyslipidemia with hypertension who presented 9/20 with left side numbness.  Started on the day of admission with no significant weakness, no seizure, no palpitation and no syncope.  Also patient has had this chest pain, continuous, increasing with deep breath, pressure pain and not radiated.  On admission vital signs were stable, EKG did not show any ischemic changes and troponin was negative for 2 times, CT scan for head did not show any acute finding.  Patient was admitted for MRI for brain and chest pain rule out.    Interval Problem Update  -Evaluated and examined the patient at bedside patient still having chest pain with numbness on her left side, no significant weakness.  -We will order stress test to rule out ischemia since patient has risk factors.  -MRI pending to rule out any stroke  -Continue aspirin and atorvastatin      I have discussed this patient's plan of care and discharge plan at IDT rounds today with Case Management, Nursing, Nursing leadership, and other members of the IDT team.    Consultants/Specialty  None    Code Status  Full Code    Disposition  Patient is not medically cleared for discharge.   Anticipate discharge to to home with close outpatient follow-up.  I have placed the appropriate orders for post-discharge needs.    Review of Systems  Review of Systems   Constitutional:  Negative for chills, fever and weight loss.   HENT:  Negative for ear pain, hearing loss and tinnitus.    Eyes:  Negative for blurred vision, double vision and photophobia.   Respiratory:  Positive for shortness of breath. Negative for cough and hemoptysis.    Cardiovascular:  Negative for chest pain, palpitations, orthopnea and claudication.    Gastrointestinal:  Negative for abdominal pain, constipation, diarrhea, nausea and vomiting.   Genitourinary:  Negative for dysuria, frequency and urgency.   Musculoskeletal:  Negative for myalgias and neck pain.   Skin:  Negative for rash.   Neurological:  Positive for sensory change. Negative for dizziness, speech change, focal weakness and weakness.      Physical Exam  Temp:  [36.1 °C (97 °F)-36.6 °C (97.9 °F)] 36.6 °C (97.9 °F)  Pulse:  [52-84] 84  Resp:  [13-18] 18  BP: (113-185)/(68-89) 117/80  SpO2:  [92 %-99 %] 94 %    Physical Exam  Constitutional:       General: She is not in acute distress.     Appearance: She is obese. She is not ill-appearing.   HENT:      Head: Normocephalic and atraumatic.   Eyes:      General: No scleral icterus.  Cardiovascular:      Rate and Rhythm: Normal rate.      Heart sounds: No murmur heard.  Pulmonary:      Effort: No respiratory distress.      Breath sounds: No wheezing or rales.   Musculoskeletal:      Right lower leg: No edema.      Left lower leg: No edema.   Skin:     Coloration: Skin is not jaundiced.      Findings: No bruising, lesion or rash.   Neurological:      Mental Status: She is oriented to person, place, and time. Mental status is at baseline.      Cranial Nerves: No cranial nerve deficit.      Sensory: Sensory deficit present.      Motor: No weakness.      Coordination: Coordination normal.      Gait: Gait normal.       Fluids  No intake or output data in the 24 hours ending 09/21/22 1018    Laboratory  Recent Labs     09/20/22 2050 09/21/22  0436   WBC 10.1 7.6   RBC 4.63 4.80   HEMOGLOBIN 12.8 12.9   HEMATOCRIT 38.3 39.9   MCV 82.7 83.1   MCH 27.6 26.9*   MCHC 33.4* 32.3*   RDW 40.4 40.3   PLATELETCT 243 234   MPV 11.5 11.1     Recent Labs     09/20/22 2050 09/21/22  0436   SODIUM 138 140   POTASSIUM 3.4* 3.5*   CHLORIDE 99 102   CO2 29 27   GLUCOSE 106* 133*   BUN 12 11   CREATININE 0.74 0.64   CALCIUM 8.9 9.1             Recent Labs     09/21/22 0436    TRIGLYCERIDE 134   HDL 46   LDL 47       Imaging  CT-HEAD W/O   Final Result      Head CT without contrast within normal limits. No evidence of acute cerebral infarction, hemorrhage or mass lesion.         DX-CHEST-PORTABLE (1 VIEW)   Final Result      No acute cardiopulmonary disease.      MR-BRAIN-W/O    (Results Pending)   EC-ECHOCARDIOGRAM COMPLETE W/O CONT    (Results Pending)   NM-CARDIAC STRESS TEST    (Results Pending)        Assessment/Plan  * TIA (transient ischemic attack)- (present on admission)  Assessment & Plan  Patient came with left side numbness with no weakness or other symptoms  CT scan did not show any ischemic changes or abnormalities  MRI is pending to rule out stroke  No need for permission hypertension  Aspirin and atorvastatin, LDL on target, will decrease the dose of atorvastatin  Check A1c  No need for PT or OT eval      Class 1 obesity in adult  Assessment & Plan  Diet and lifestyle modification  Body mass index is 34.67 kg/m².      HLD (hyperlipidemia)  Assessment & Plan  LDL around 40s  Will decrease atorvastatin to 20 mg daily    Type 2 diabetes mellitus without complication, with long-term current use of insulin (HCC)- (present on admission)  Assessment & Plan  A1c 7.6  Encouraged the patient for healthy diet and losing weight  Continue home medication Lantus and holding metformin    HTN (hypertension)  Assessment & Plan  Resume anti-HTN meds when appropriate    Chest pain- (present on admission)  Assessment & Plan  Atypical chest pain  Normal EKG and troponin for 3 times  Less likely to be ischemia however since patient has risk of coronary artery disease and family history, will order stress test to rule out any ischemia.  Continue telemetry, aspirin and atorvastatin    GERD (gastroesophageal reflux disease)  Assessment & Plan  Continue PPI omeprazole.       VTE prophylaxis: SCDs/TEDs and enoxaparin ppx    I have performed a physical exam and reviewed and updated ROS and Plan  today (9/21/2022). In review of yesterday's note (9/20/2022), there are no changes except as documented above.

## 2022-09-21 NOTE — ASSESSMENT & PLAN NOTE
Patient came with left side numbness with no weakness or other symptoms  CT scan did not show any ischemic changes or abnormalities  MRI is pending to rule out stroke  No need for permission hypertension  Aspirin and atorvastatin, LDL on target, will decrease the dose of atorvastatin  Check A1c  No need for PT or OT eval

## 2022-09-21 NOTE — DISCHARGE PLANNING
[facility] Transitional Care Coordination    Referral from: Dr. Gross    Insurance Provider on Facesheet: [employer]    Potential Rehab Diagnosis: CVA?    Chart review indicates patient may have on going medical management and may have therapy needs to possibly meet inpatient rehab facility criteria with the goal of returning to community.    D/C support will need to be verified: Daughter    Physiatry consultation pended per protocol.  W/U & TX pending.      Thank you for the referral.

## 2022-09-22 VITALS
WEIGHT: 204.59 LBS | SYSTOLIC BLOOD PRESSURE: 164 MMHG | OXYGEN SATURATION: 97 % | HEART RATE: 68 BPM | BODY MASS INDEX: 34.09 KG/M2 | HEIGHT: 65 IN | RESPIRATION RATE: 17 BRPM | TEMPERATURE: 97.7 F | DIASTOLIC BLOOD PRESSURE: 93 MMHG

## 2022-09-22 PROBLEM — G45.9 TIA (TRANSIENT ISCHEMIC ATTACK): Status: RESOLVED | Noted: 2022-09-20 | Resolved: 2022-09-22

## 2022-09-22 LAB
ANION GAP SERPL CALC-SCNC: 12 MMOL/L (ref 7–16)
BASOPHILS # BLD AUTO: 0.5 % (ref 0–1.8)
BASOPHILS # BLD: 0.03 K/UL (ref 0–0.12)
BUN SERPL-MCNC: 9 MG/DL (ref 8–22)
CALCIUM SERPL-MCNC: 9.4 MG/DL (ref 8.5–10.5)
CHLORIDE SERPL-SCNC: 100 MMOL/L (ref 96–112)
CO2 SERPL-SCNC: 26 MMOL/L (ref 20–33)
CREAT SERPL-MCNC: 0.67 MG/DL (ref 0.5–1.4)
EOSINOPHIL # BLD AUTO: 0.04 K/UL (ref 0–0.51)
EOSINOPHIL NFR BLD: 0.7 % (ref 0–6.9)
ERYTHROCYTE [DISTWIDTH] IN BLOOD BY AUTOMATED COUNT: 40.3 FL (ref 35.9–50)
GFR SERPLBLD CREATININE-BSD FMLA CKD-EPI: 107 ML/MIN/1.73 M 2
GLUCOSE BLD STRIP.AUTO-MCNC: 147 MG/DL (ref 65–99)
GLUCOSE SERPL-MCNC: 156 MG/DL (ref 65–99)
HCT VFR BLD AUTO: 41.9 % (ref 37–47)
HGB BLD-MCNC: 13.8 G/DL (ref 12–16)
IMM GRANULOCYTES # BLD AUTO: 0.02 K/UL (ref 0–0.11)
IMM GRANULOCYTES NFR BLD AUTO: 0.3 % (ref 0–0.9)
LYMPHOCYTES # BLD AUTO: 2.33 K/UL (ref 1–4.8)
LYMPHOCYTES NFR BLD: 38 % (ref 22–41)
MAGNESIUM SERPL-MCNC: 2 MG/DL (ref 1.5–2.5)
MCH RBC QN AUTO: 27.1 PG (ref 27–33)
MCHC RBC AUTO-ENTMCNC: 32.9 G/DL (ref 33.6–35)
MCV RBC AUTO: 82.2 FL (ref 81.4–97.8)
MONOCYTES # BLD AUTO: 0.29 K/UL (ref 0–0.85)
MONOCYTES NFR BLD AUTO: 4.7 % (ref 0–13.4)
NEUTROPHILS # BLD AUTO: 3.42 K/UL (ref 2–7.15)
NEUTROPHILS NFR BLD: 55.8 % (ref 44–72)
NRBC # BLD AUTO: 0 K/UL
NRBC BLD-RTO: 0 /100 WBC
PLATELET # BLD AUTO: 248 K/UL (ref 164–446)
PMV BLD AUTO: 11.8 FL (ref 9–12.9)
POTASSIUM SERPL-SCNC: 3.7 MMOL/L (ref 3.6–5.5)
RBC # BLD AUTO: 5.1 M/UL (ref 4.2–5.4)
SODIUM SERPL-SCNC: 138 MMOL/L (ref 135–145)
WBC # BLD AUTO: 6.1 K/UL (ref 4.8–10.8)

## 2022-09-22 PROCEDURE — 83735 ASSAY OF MAGNESIUM: CPT

## 2022-09-22 PROCEDURE — 85025 COMPLETE CBC W/AUTO DIFF WBC: CPT

## 2022-09-22 PROCEDURE — 80048 BASIC METABOLIC PNL TOTAL CA: CPT

## 2022-09-22 PROCEDURE — A9270 NON-COVERED ITEM OR SERVICE: HCPCS | Performed by: INTERNAL MEDICINE

## 2022-09-22 PROCEDURE — 82962 GLUCOSE BLOOD TEST: CPT

## 2022-09-22 PROCEDURE — 700102 HCHG RX REV CODE 250 W/ 637 OVERRIDE(OP): Performed by: STUDENT IN AN ORGANIZED HEALTH CARE EDUCATION/TRAINING PROGRAM

## 2022-09-22 PROCEDURE — 700102 HCHG RX REV CODE 250 W/ 637 OVERRIDE(OP): Performed by: INTERNAL MEDICINE

## 2022-09-22 PROCEDURE — A9270 NON-COVERED ITEM OR SERVICE: HCPCS | Performed by: STUDENT IN AN ORGANIZED HEALTH CARE EDUCATION/TRAINING PROGRAM

## 2022-09-22 PROCEDURE — 99217 PR OBSERVATION CARE DISCHARGE: CPT | Performed by: INTERNAL MEDICINE

## 2022-09-22 PROCEDURE — 36415 COLL VENOUS BLD VENIPUNCTURE: CPT

## 2022-09-22 PROCEDURE — G0378 HOSPITAL OBSERVATION PER HR: HCPCS

## 2022-09-22 RX ORDER — LOSARTAN POTASSIUM 50 MG/1
50 TABLET ORAL
Status: DISCONTINUED | OUTPATIENT
Start: 2022-09-22 | End: 2022-09-22 | Stop reason: HOSPADM

## 2022-09-22 RX ORDER — METOPROLOL SUCCINATE 25 MG/1
50 TABLET, EXTENDED RELEASE ORAL
Status: DISCONTINUED | OUTPATIENT
Start: 2022-09-22 | End: 2022-09-22 | Stop reason: HOSPADM

## 2022-09-22 RX ADMIN — ASPIRIN 81 MG 81 MG: 81 TABLET ORAL at 04:14

## 2022-09-22 RX ADMIN — METOPROLOL SUCCINATE 50 MG: 25 TABLET, EXTENDED RELEASE ORAL at 09:18

## 2022-09-22 RX ADMIN — SENNOSIDES AND DOCUSATE SODIUM 2 TABLET: 50; 8.6 TABLET ORAL at 04:14

## 2022-09-22 RX ADMIN — LOSARTAN POTASSIUM 50 MG: 50 TABLET, FILM COATED ORAL at 09:18

## 2022-09-22 RX ADMIN — OMEPRAZOLE 20 MG: 20 CAPSULE, DELAYED RELEASE ORAL at 04:14

## 2022-09-22 ASSESSMENT — PAIN DESCRIPTION - PAIN TYPE: TYPE: ACUTE PAIN

## 2022-09-22 NOTE — DISCHARGE INSTRUCTIONS
Discharge Instructions    Discharged to home by car with relative. Discharged via wheelchair, hospital escort: Yes.  Special equipment needed: Not Applicable    Be sure to schedule a follow-up appointment with your primary care doctor or any specialists as instructed.     Discharge Plan:   Diet Plan: Discussed  Activity Level: Discussed  Confirmed Follow up Appointment: Appointment Scheduled  Confirmed Symptoms Management: Discussed  Medication Reconciliation Updated: Yes    I understand that a diet low in cholesterol, fat, and sodium is recommended for good health. Unless I have been given specific instructions below for another diet, I accept this instruction as my diet prescription.   Other diet: Diabetic/Cardiac    Special Instructions:     Stroke/CVA/TIA/Hemorrhagic Ischemia Discharge Instructions  You have had a stroke. Your risk factors have been identified as follows:  High blood pressure  Diabetes  Heart disease  Overweight  It is important that you reduce your risk factors to avoid another stroke in the future. Here are some general guidelines to follow:  Eat healthy - avoid food high in fat.  Get regular exercise.  Maintain a healthy weight.  Avoid smoking.  Avoid alcohol and illegal drug use.  Take your medications as directed.  For more information regarding risk factors, refer to pages 17-19 in your Stroke Patient Education Guide. Stroke Education Guide was given to patient and family member.    Warning signs of a stroke include (which can also be found on page 3 of your Stroke Patient Education Guide):  Sudden numbness of weakness of the face, arm or leg (especially on one side of the body).  Sudden confusion, trouble speaking or understanding.  Sudden trouble seeing in one or both eyes.  Sudden trouble walking, dizziness, loss of balance or coordination.  Sudden severe headache with no known cause.  It is very important to get treatment quickly when a stroke occurs. If you experience any of the above  warning signs, call [phone] immediately.     Some patients who have had a stroke will be going home on a blood thinner medication called Warfarin (Coumadin).  This medication requires very close monitoring and follow up.  This follow up can be provided by either your Primary Care Physician or by [facility] Outpatient Anticoagulation Service.  The Outpatient Anticoagulation Service is located at the [place] [facility] at [facility] ([facility]).  If you do not know when your follow up appointment is scheduled, call [phone] to verify your appointment time.    -Is this patient being discharged with medication to prevent blood clots?  Yes, Aspirin   Aspirin, ASA oral tablets  What is this medicine?  ASPIRIN (AS pir in) is a pain reliever. It is used to treat mild pain and fever. This medicine is also used as directed by a doctor to prevent and to treat heart attacks, to prevent strokes and blood clots, and to treat arthritis or inflammation.  This medicine may be used for other purposes; ask your health care provider or pharmacist if you have questions.  COMMON BRAND NAME(S): Aspir-Low, Aspir-Jesica, Aspirtab, ReClaims Advanced Aspirin, Osmel Aspirin, ReClaims Aspirin Extra Strength, Osmel Aspirin Plus, Osmel Extra Strength, Osmel Extra Strength Plus, Osmel Genuine Aspirin, Osmel Womens Aspirin, Bufferin, Bufferin Extra Strength, Bufferin Low Dose  What should I tell my health care provider before I take this medicine?  They need to know if you have any of these conditions:  anemia  asthma  bleeding problems  child with chickenpox, the flu, or other viral infection  diabetes  gout  if you frequently drink alcohol containing drinks  kidney disease  liver disease  low level of vitamin K  lupus  smoke tobacco  stomach ulcers or other problems  an unusual or allergic reaction to aspirin, tartrazine dye, other medicines, dyes, or preservatives  pregnant or trying to get  pregnant  breast-feeding  How should I use this medicine?  Take this medicine by mouth with a glass of water. Follow the directions on the package or prescription label. You can take this medicine with or without food. If it upsets your stomach, take it with food. Do not take your medicine more often than directed.  Talk to your pediatrician regarding the use of this medicine in children. While this drug may be prescribed for children as young as 12 years of age for selected conditions, precautions do apply. Children and teenagers should not use this medicine to treat chicken pox or flu symptoms unless directed by a doctor.  Patients over 65 years old may have a stronger reaction and need a smaller dose.  Overdosage: If you think you have taken too much of this medicine contact a poison control center or emergency room at once.  NOTE: This medicine is only for you. Do not share this medicine with others.  What if I miss a dose?  If you are taking this medicine on a regular schedule and miss a dose, take it as soon as you can. If it is almost time for your next dose, take only that dose. Do not take double or extra doses.  What may interact with this medicine?  Do not take this medicine with any of the following medications:  cidofovir  ketorolac  probenecid  This medicine may also interact with the following medications:  alcohol  alendronate  bismuth subsalicylate  flavocoxid  herbal supplements like feverfew, garlic, reji, ginkgo biloba, horse chestnut  medicines for diabetes or glaucoma like acetazolamide, methazolamide  medicines for gout  medicines that treat or prevent blood clots like enoxaparin, heparin, ticlopidine, warfarin  other aspirin and aspirin-like medicines  NSAIDs, medicines for pain and inflammation, like ibuprofen or naproxen  pemetrexed  sulfinpyrazone  varicella live vaccine  This list may not describe all possible interactions. Give your health care provider a list of all the medicines,  herbs, non-prescription drugs, or dietary supplements you use. Also tell them if you smoke, drink alcohol, or use illegal drugs. Some items may interact with your medicine.  What should I watch for while using this medicine?  If you are treating yourself for pain, tell your doctor or health care professional if the pain lasts more than 10 days, if it gets worse, or if there is a new or different kind of pain. Tell your doctor if you see redness or swelling. Also, check with your doctor if you have a fever that lasts for more than 3 days. Only take this medicine to prevent heart attacks or blood clotting if prescribed by your doctor or health care professional.  Do not take aspirin or aspirin-like medicines with this medicine. Too much aspirin can be dangerous. Always read the labels carefully.  This medicine can irritate your stomach or cause bleeding problems. Do not smoke cigarettes or drink alcohol while taking this medicine. Do not lie down for 30 minutes after taking this medicine to prevent irritation to your throat.  If you are scheduled for any medical or dental procedure, tell your healthcare provider that you are taking this medicine. You may need to stop taking this medicine before the procedure.  This medicine may be used to treat migraines. If you take migraine medicines for 10 or more days a month, your migraines may get worse. Keep a diary of headache days and medicine use. Contact your healthcare professional if your migraine attacks occur more frequently.  What side effects may I notice from receiving this medicine?  Side effects that you should report to your doctor or health care professional as soon as possible:  allergic reactions like skin rash, itching or hives, swelling of the face, lips, or tongue  breathing problems  changes in hearing, ringing in the ears  confusion  general ill feeling or flu-like symptoms  pain on swallowing  redness, blistering, peeling or loosening of the skin,  "including inside the mouth or nose  signs and symptoms of bleeding such as bloody or black, tarry stools; red or dark-brown urine; spitting up blood or brown material that looks like coffee grounds; red spots on the skin; unusual bruising or bleeding from the eye, gums, or nose  trouble passing urine or change in the amount of urine  unusually weak or tired  yellowing of the eyes or skin  Side effects that usually do not require medical attention (report to your doctor or health care professional if they continue or are bothersome):  diarrhea or constipation  headache  nausea, vomiting  stomach gas, heartburn  This list may not describe all possible side effects. Call your doctor for medical advice about side effects. You may report side effects to FDA at 2-626-FDA-0644.  Where should I keep my medicine?  Keep out of the reach of children.  Store at room temperature between 15 and 30 degrees C (59 and 86 degrees F). Protect from heat and moisture. Do not use this medicine if it has a strong vinegar smell. Throw away any unused medicine after the expiration date.  NOTE: This sheet is a summary. It may not cover all possible information. If you have questions about this medicine, talk to your doctor, pharmacist, or health care provider.  © 2020 Elsevier/Gold Standard (2018-01-30 10:42:13)    Is patient discharged on Warfarin / Coumadin?   No   Ataque isquémico transitorio  Transient Ischemic Attack  Un accidente isquémico transitorio (AIT) es un \"accidente cerebrovascular de advertencia\" que causa síntomas similares a los de un accidente cerebrovascular que luego desaparecen rápidamente. Los síntomas de un AIT aparecen de repente y menard menos de 24 horas. A diferencia de un accidente cerebrovascular, el AIT no causa daño permanente en el cerebro. Es importante conocer los síntomas de un AIT y qué hacer. Busque atención médica de inmediato, incluso si leyla síntomas desaparecen.  Tener un AIT es dot señal de que corre " un mayor riesgo de tener un accidente cerebrovascular, cuyo daño es permanente. Los cambios de estilo de jamal y los tratamientos médicos pueden ayudar a prevenir un accidente cerebrovascular.  ¿Cuáles son las causas?  La causa de esta afección es dot obstrucción temporal en dot arteria en la robin o el celena. La obstrucción no permite que llegue el flujo de alejandra que el cerebro necesita, y puede causar varios síntomas. La obstrucción puede estar causada por:  Acumulación de grasa en dot arteria en la robin o el celena (aterosclerosis).  Un coágulo de alejandra.  Desgarro de dot arteria (disección).  Inflamación de dot arteria (vasculitis).  Algunas veces, la causa no se conoce.  ¿Qué incrementa el riesgo?  Hay ciertos factores que pueden hacer que sea más propenso a sufrir esta afección. Algunos de estos factores los puede controlar, briseida por ejemplo:  Obesidad.  Usar productos que contengan nicotina o tabaco, briseida cigarrillos y cigarrillos electrónicos.  [place] métodos anticonceptivos por vía oral, en especial si consume tabaco.  Inactividad física.  Consumo excesivo de alcohol.  Consumo de drogas, especialmente cocaína y metanfetamina.  Otros factores de riesgo son los siguientes:  Presión arterial melissa (hipertensión arterial).  Colesterol alto.  Diabetes mellitus.  Enfermedad cardíaca (arteriopatía coronaria).  Fibrilación auricular.  Ser afroamericano o hispano.  Tener más de 60 años.  Ser hombre.  Antecedentes familiares de accidente cerebrovascular.  Historia previa de coágulos sanguíneos, accidente cerebrovascular, AIT o infarto de miocardio.  Anemia drepanocítica.  Ser tay con antecedentes de preeclampsia.  Cefalea migrañosa.  Apnea del sueño.  Enfermedades inflamatorias crónicas, briseida artritis reumatoide o lupus.  Trastornos de coagulación (estado hipercoagulable).  ¿Cuáles son los signos o los síntomas?  Los síntomas de esta afección son los mismos que los de un accidente cerebrovascular, henry son  temporales. Los síntomas aparecen repentinamente y desaparecen rápidamente, generalmente en algunos minutos u horas. Entre los síntomas repentinos se pueden incluir los siguientes:  Debilidad o adormecimiento de la bam, el brazo o la pierna, especialmente en un lado del cuerpo.  Dificultad para caminar o para  los brazos o las piernas.  Dificultad para hablar o comprender el lenguaje, o ambas (afasia).  Cambios en la visión en malka o ambos ojos. Estos incluyen visión doble, visión borrosa o pérdida de la visión.  Mareos.  Confusión.  Pérdida del equilibrio o de la coordinación.  Náuseas y vómitos.  Dolor de robin intenso sin causa aparente.  En lo posible, tome nota de la hora exacta en la que se sintió normal por última vez y de la hora en que comenzaron los síntomas. Infórmele a sutton médico.  ¿Cómo se diagnostica?  Esta afección se puede diagnosticar en función de lo siguiente:  Los síntomas y los antecedentes médicos.  Un examen físico.  Estudios de diagnóstico por imágenes, generalmente dot exploración por tomografía computarizada (TC) o dot resonancia magnética (RM) del cerebro.  Análisis de alejandra.  También pueden hacerle otras pruebas, incluidas las siguientes:  Electrocardiograma (ECG).  Un ecocardiograma.  Ecografía de la carótida.  Un estudio de la circulación del cerebro (angiograma por TC o angiograma por RM).  Monitorización electrocardiográfica continua.  ¿Cómo se trata?  El objetivo del tratamiento es reducir el riesgo de un accidente cerebrovascular posterior. El tratamiento puede incluir terapias de prevención de accidente cerebrovascular tales briseida:  Cambios en la dieta o estilo de jamal para disminuir sutton riesgo. Los cambios de estilo de jamal pueden incluir hacer ejercicio y dejar de fumar.  Medicamentos para diluir la alejandra (antiplaquetarios o anticoagulantes).  Medicamentos para la presión arterial.  Medicamentos para reducir el colesterol.  Tratar otras afecciones de riaz, briseida diabetes o  fibrilación auricular.  Si las pruebas muestran que tiene un estrechamiento en las arterias del cerebro, suttno médico puede recomendarle malka de los siguientes procedimientos:  Endarterectomía carotídea. Esta es dot cirugía que se realiza para eliminar la obstrucción de la arteria.  Angioplastia carotídea y colocación de stent. [place] es un procedimiento realizado para abrir o ensanchar dot arteria en el celena utilizando un tubo de nehemiah metálica (stent). El stent ayuda a mantener abierta la arteria al sostener las urbina arteriales.  Siga estas indicaciones en sutton casa:  Medicamentos    [place] los medicamentos de venta nik y los recetados solamente briseida se lo haya indicado el médico.  Si le indicaron que tomara medicamentos para diluir la alejandra, briseida aspirinas o anticoagulantes, tómelos exactamente briseida se lo haya indicado el médico.  El exceso de anticoagulantes puede causar hemorragias.  Si no marina la cantidad suficiente de anticoagulantes, no tendrá la protección necesaria contra un accidente cerebrovascular u otros problemas.  Comida y bebida    Consuma 5 o más porciones de frutas y verduras por día.  Siga las indicaciones del médico acerca de la dieta. Es posible que deba seguir un determinado plan de nutrición para ayudar a controlar los factores de riesgo de un accidente cerebrovascular, briseida presión arterial melissa, colesterol alto, diabetes u obesidad. Estas pueden incluir:  Lleve dot dieta baja en grasas y sodio.  Incluya mucha fibra en sutton dieta.  Limite la cantidad de carbohidratos y azúcar en sutton dieta.  Limite el consumo de alcohol a no más de 1 medida por día si es tay y no está embarazada, y 2 medidas por día si es hombre. Dot medida equivale a 12 oz de cerveza, 5 oz de vino o 1½ oz de bebidas alcohólicas de melissa graduación.  Indicaciones generales  Mantenga un peso saludable.  Manténgase físicamente activo. Realice al menos 30 minutos de ejercicio todos o karissa todos los días.  [place] si tiene apnea del  sueño y busque tratamiento si es necesario.  No consuma ningún producto que contenga nicotina o tabaco, briseida cigarrillos y cigarrillos electrónicos. Si necesita ayuda para dejar de fumar, consulte al médico.  No consuma drogas.  Concurra a todas las visitas de seguimiento briseida se lo haya indicado el médico. [place] es importante.  Dónde buscar más información  American Stroke Association (Asociación Americana de Accidente Cerebrovascular): www.strokeassociation.org  National Stroke Association (Asociación Nacional de Accidente Cerebrovascular):  www.stroke.org  Solicite ayuda inmediatamente si:  Siente dolor en el pecho o latidos cardíacos irregulares.  Tiene síntomas de un accidente cerebrovascular. El acrónimo BEFAST es dot manera fácil de recordar las principales señales de advertencia de un accidente cerebrovascular.  B (Balance) = problemas de equilibrio. Los signos incluyen dificultad repentina para caminar o pérdida del equilibrio.  E (Eyes) = problemas en los ojos. [place] incluye problemas para guanaco o un cambio repentino en la visión.  F (Face) = cambios en el luis e. [place] incluye debilidad repentina o entumecimiento del luise , o luis e o párpado que se caen hacia un lado.  A (Arms) = debilidad o adormecimiento en un brazo. [place] sucede de repente y generalmente en un lado del cuerpo.  S (Speech) = problemas en el habla. [place] incluye dificultad para hablar o dificultad para comprender el lenguaje.  T - Time (tiempo). Es tiempo de llamar al 911 o buscar atención de emergencia. No espere hasta que los síntomas desaparezcan. [place] nota de la hora en que comenzaron leyla síntomas.  Otros signos de un accidente cerebrovascular pueden incluir:  Dolor de robin súbito e intenso que no tiene causa aparente.  Náuseas o vómitos.  Convulsiones.  Estos síntomas pueden representar un problema grave que constituye dot emergencia. No espere a guanaco si los síntomas desaparecen. Solicite atención médica de inmediato. Comuníquese con el  servicio de emergencias de sutton localidad (911 en los [place]). No conduzca por leyla propios medios hasta el hospital.  Resumen  Un AIT ocurre cuando dot arteria en la robin o el celena se obstruye, lo que causa síntomas similares a los de un accidente cerebrovascular que luego desaparecen rápidamente. La obstrucción desaparece antes de que se produzca algún daño en el cerebro. Un AIT es dot urgencia médica y requiere asistencia médica inmediata.  Los síntomas de esta afección son los mismos que los de un accidente cerebrovascular, henry son temporales. Los síntomas habitualmente aparecen repentinamente y desaparecen rápidamente, generalmente en algunos minutos u horas.  Tener un AIT es dot señal de que corre un mayor riesgo de tener un accidente cerebrovascular en un futuro cercano.  El tratamiento puede incluir medicamentos para diluir la alejandra, así briseida otros tipos de medicamentos, cambios en la dieta y cambios en el estilo de jamal para controlar las afecciones que aumentan el riesgo de tener otro AIT o un accidente cerebrovascular.  Esta información no tiene briseida fin reemplazar el consejo del médico. Asegúrese de hacerle al médico cualquier pregunta que tenga.  Document Released: 09/27/2006 Document Revised: 08/01/2019 Document Reviewed: 08/01/2019  Elsevier Patient Education © 2020 Elsevier Inc.

## 2022-09-22 NOTE — PROGRESS NOTES
Discharge instructions and education provided to Pt. All IV's and lines removed. Pt has all of her belongings including her cell phone. Pt given blood pressure medication 10 minutes prior to discharge. Pt in a hurry to leave due to job interview. BP was not accurately checked due to frequently mobility. Pt escorted down to  parking via wheelchair by nursing staff.

## 2022-09-22 NOTE — PROGRESS NOTES
Monitor summary: SR 63-75, NM .14, QRS .08, QT .42 and rre pvc, rare couup per strip from monitor room.

## 2022-09-22 NOTE — DISCHARGE PLANNING
MRI is negative for an acute event.  Current documentation does not exhibit the need for 2 of 3 disciplines.  TCC will no longer follow.  Please reach out to myself with any interval changes/questions.

## 2022-09-22 NOTE — DISCHARGE SUMMARY
Discharge Summary    CHIEF COMPLAINT ON ADMISSION  Chief Complaint   Patient presents with    Numbness     L sided face and arm numbness since 5pm today  Dizziness occurring at the same time, feels like room is spinning, endorses nausea   Strengh equal bilat, decreased sensation L face and L arm, no obvious facial droop or slurring    Chest Pain     Central chest pain starting this morning, pressure like, subsided throughout the day   Currently denies chest pain       Reason for Admission  Numbness in the left side possible TIA  Atypica noncardiac l chest pain    Admission Date  9/20/2022    CODE STATUS  Full Code    HPI & HOSPITAL COURSE    47-year-old female with history of obesity diabetes and dyslipidemia with hypertension who presented 9/20 with left side numbness.  Started on the day of admission with no significant weakness, no seizure, no palpitation and no syncope.  Also patient has had this chest pain, continuous, increasing with deep breath, pressure pain and not radiated.  On admission vital signs were stable, EKG did not show any ischemic changes and troponin was negative for 2 times, D-dimer was negative.  CT scan for head did not show any acute finding.  Chest was done and did not show any ischemic area, MRI for brain showed area of encephalomalacia possibly related to old hemorrhagic infarct or old hypertensive hemorrhage.  Next day patient was improved and denied any numbness or weakness on her arms or legs, patient feels okay for discharge.    On the day of discharge the patient denied any symptoms, neuro exam did not show any neurofocal deficit, had long discussion with the patient about importance of controlling her blood pressure and blood sugar at home, discussed the plan of care, she understood and agreed    Therefore, she is discharged in good and stable condition to home with close outpatient follow-up.    The patient recovered much more quickly than anticipated on admission.    Discharge  Date  09/22/22      FOLLOW UP ITEMS POST DISCHARGE  Follow-up with the primary care doctor for diabetes and hypertension    DISCHARGE DIAGNOSES  Principal Problem (Resolved):    TIA (transient ischemic attack) POA: Yes  Active Problems:    HTN (hypertension) POA: Unknown    Type 2 diabetes mellitus without complication, with long-term current use of insulin (HCC) POA: Yes    HLD (hyperlipidemia) POA: Unknown    Class 1 obesity in adult POA: Unknown    GERD (gastroesophageal reflux disease) POA: Unknown  Resolved Problems:    Chest pain POA: Yes      Overview: February 2019: MPI with no ischemia or infarct, LVEF 72%.      March 2015: MPI with no ischemia, LVEF 70%.      November 2017: Echocardiogram with normal LV size, LVEF 65%. No valvular       abnormalities.      FOLLOW UP  Mia Arnett, P.AAngel  [address]  [facility] [id number] [phone]    Follow up in 1 week(s)        MEDICATIONS ON DISCHARGE     Medication List        CONTINUE taking these medications        Instructions   aspirin EC 81 MG Tbec  Commonly known as: ECOTRIN   Take 81 mg by mouth every morning.  Dose: 81 mg     atorvastatin 40 MG Tabs  Commonly known as: LIPITOR   Take 40 mg by mouth every evening.  Dose: 40 mg     chlorthalidone 25 MG Tabs  Commonly known as: HYGROTON   Take 25 mg by mouth every day.  Dose: 25 mg     insulin glargine 100 UNIT/ML Sopn injection  Generic drug: insulin glargine   Inject 35 Units under the skin every evening.  Dose: 35 Units     losartan 100 MG Tabs  Commonly known as: COZAAR   Take 1 tablet by mouth every day.  Dose: 100 mg     metformin 1000 MG tablet  Commonly known as: GLUCOPHAGE   Take 1,000 mg by mouth 2 times a day, with meals.  Dose: 1,000 mg     metoprolol SR 50 MG Tb24  Commonly known as: TOPROL XL   Take 1 tablet by mouth every day.  Dose: 50 mg     omeprazole 20 MG delayed-release capsule  Commonly known as: PRILOSEC   Take 1 Capsule by mouth every day.  Dose: 20 mg               Allergies  Allergies   Allergen Reactions    Latex Rash     Condons, rash  Latex gloves, rash       DIET  Orders Placed This Encounter   Procedures    Diet Order Diet: Consistent CHO (Diabetic); Second Modifier: (optional): Cardiac     Standing Status:   Standing     Number of Occurrences:   1     Order Specific Question:   Diet:     Answer:   Consistent CHO (Diabetic) [4]     Order Specific Question:   Second Modifier: (optional)     Answer:   Cardiac [6]       ACTIVITY  As tolerated.  Weight bearing as tolerated    CONSULTATIONS  None     PROCEDURES  None     LABORATORY  Lab Results   Component Value Date    SODIUM 138 09/22/2022    POTASSIUM 3.7 09/22/2022    CHLORIDE 100 09/22/2022    CO2 26 09/22/2022    GLUCOSE 156 (H) 09/22/2022    BUN 9 09/22/2022    CREATININE 0.67 09/22/2022    CREATININE 0.6 05/27/2007        Lab Results   Component Value Date    WBC 6.1 09/22/2022    HEMOGLOBIN 13.8 09/22/2022    HEMATOCRIT 41.9 09/22/2022    PLATELETCT 248 09/22/2022        Total time of the discharge process exceeds 34 minutes.

## 2022-09-22 NOTE — PROGRESS NOTES
Monitor Summary: SB 49-57, MS 0.16, QRS 0.09, QT 0.53, with rare PVCs per strip from monitor room.

## 2022-09-22 NOTE — CARE PLAN
The patient is Stable - Low risk of patient condition declining or worsening    Shift Goals  Clinical Goals: Stable neuro status, pain management, safety  Patient Goals: MRI Results and DC  Family Goals: DC    Progress made toward(s) clinical / shift goals:  Assumed care of pt at 1845. POC discussed with pt. Pt A/Ox 4 and verbalized understanding. Pt on fall precautions. Pt on Q4hr neuro checks. Pt educated to use the call light for assistance. Call light within reach. Pt rounded on frequently throughout the shift.      Problem: Optimal Care of the Stroke Patient  Goal: Optimal acute care for the stroke patient  Outcome: Progressing  Note: Pt under CVA protocol. Last NIHSS 0.      Problem: Neuro Status  Goal: Neuro status will remain stable or improve  Outcome: Progressing  Note: Pt's neuro status assessed throughout the shift with frequent neuro checks. Pt has maintained a stable neuro status.       Problem: Pain - Standard  Goal: Alleviation of pain or a reduction in pain to the patient’s comfort goal  Outcome: Progressing  Note: Pt's pain assessed throughout shift. Patient offered pharmacological and non-pharmacological interventions, see MAR. Pt has not complained of chest pain, but did complain of a mild headache. Headache was relieved with Tylenol and rest.       Patient is not progressing towards the following goals:

## 2022-09-29 ENCOUNTER — APPOINTMENT (OUTPATIENT)
Dept: RADIOLOGY | Facility: MEDICAL CENTER | Age: 47
End: 2022-09-29
Attending: EMERGENCY MEDICINE
Payer: MEDICAID

## 2022-09-29 ENCOUNTER — HOSPITAL ENCOUNTER (EMERGENCY)
Facility: MEDICAL CENTER | Age: 47
End: 2022-09-29
Attending: EMERGENCY MEDICINE
Payer: MEDICAID

## 2022-09-29 VITALS
OXYGEN SATURATION: 96 % | DIASTOLIC BLOOD PRESSURE: 82 MMHG | HEIGHT: 65 IN | RESPIRATION RATE: 16 BRPM | SYSTOLIC BLOOD PRESSURE: 131 MMHG | TEMPERATURE: 98 F | BODY MASS INDEX: 34.09 KG/M2 | WEIGHT: 204.59 LBS | HEART RATE: 66 BPM

## 2022-09-29 DIAGNOSIS — G44.89 OTHER HEADACHE SYNDROME: ICD-10-CM

## 2022-09-29 DIAGNOSIS — E87.6 HYPOKALEMIA: ICD-10-CM

## 2022-09-29 LAB
ALBUMIN SERPL BCP-MCNC: 4.2 G/DL (ref 3.2–4.9)
ALBUMIN/GLOB SERPL: 1.3 G/DL
ALP SERPL-CCNC: 89 U/L (ref 30–99)
ALT SERPL-CCNC: 19 U/L (ref 2–50)
ANION GAP SERPL CALC-SCNC: 15 MMOL/L (ref 7–16)
APTT PPP: 24 SEC (ref 24.7–36)
AST SERPL-CCNC: 19 U/L (ref 12–45)
BASOPHILS # BLD AUTO: 0.5 % (ref 0–1.8)
BASOPHILS # BLD: 0.03 K/UL (ref 0–0.12)
BILIRUB SERPL-MCNC: 0.4 MG/DL (ref 0.1–1.5)
BUN SERPL-MCNC: 9 MG/DL (ref 8–22)
CALCIUM SERPL-MCNC: 9.3 MG/DL (ref 8.5–10.5)
CHLORIDE SERPL-SCNC: 95 MMOL/L (ref 96–112)
CO2 SERPL-SCNC: 24 MMOL/L (ref 20–33)
CREAT SERPL-MCNC: 0.6 MG/DL (ref 0.5–1.4)
EKG IMPRESSION: NORMAL
EOSINOPHIL # BLD AUTO: 0.07 K/UL (ref 0–0.51)
EOSINOPHIL NFR BLD: 1.2 % (ref 0–6.9)
ERYTHROCYTE [DISTWIDTH] IN BLOOD BY AUTOMATED COUNT: 37.4 FL (ref 35.9–50)
GFR SERPLBLD CREATININE-BSD FMLA CKD-EPI: 110 ML/MIN/1.73 M 2
GLOBULIN SER CALC-MCNC: 3.2 G/DL (ref 1.9–3.5)
GLUCOSE SERPL-MCNC: 163 MG/DL (ref 65–99)
HCT VFR BLD AUTO: 40.6 % (ref 37–47)
HGB BLD-MCNC: 13.9 G/DL (ref 12–16)
IMM GRANULOCYTES # BLD AUTO: 0.02 K/UL (ref 0–0.11)
IMM GRANULOCYTES NFR BLD AUTO: 0.3 % (ref 0–0.9)
INR PPP: 0.98 (ref 0.87–1.13)
LYMPHOCYTES # BLD AUTO: 2.35 K/UL (ref 1–4.8)
LYMPHOCYTES NFR BLD: 40.3 % (ref 22–41)
MCH RBC QN AUTO: 27.3 PG (ref 27–33)
MCHC RBC AUTO-ENTMCNC: 34.2 G/DL (ref 33.6–35)
MCV RBC AUTO: 79.8 FL (ref 81.4–97.8)
MONOCYTES # BLD AUTO: 0.3 K/UL (ref 0–0.85)
MONOCYTES NFR BLD AUTO: 5.1 % (ref 0–13.4)
NEUTROPHILS # BLD AUTO: 3.06 K/UL (ref 2–7.15)
NEUTROPHILS NFR BLD: 52.6 % (ref 44–72)
NRBC # BLD AUTO: 0 K/UL
NRBC BLD-RTO: 0 /100 WBC
PLATELET # BLD AUTO: 221 K/UL (ref 164–446)
PMV BLD AUTO: 11.8 FL (ref 9–12.9)
POTASSIUM SERPL-SCNC: 3 MMOL/L (ref 3.6–5.5)
PROT SERPL-MCNC: 7.4 G/DL (ref 6–8.2)
PROTHROMBIN TIME: 12.9 SEC (ref 12–14.6)
RBC # BLD AUTO: 5.09 M/UL (ref 4.2–5.4)
SODIUM SERPL-SCNC: 134 MMOL/L (ref 135–145)
WBC # BLD AUTO: 5.8 K/UL (ref 4.8–10.8)

## 2022-09-29 PROCEDURE — 700111 HCHG RX REV CODE 636 W/ 250 OVERRIDE (IP): Performed by: EMERGENCY MEDICINE

## 2022-09-29 PROCEDURE — 99285 EMERGENCY DEPT VISIT HI MDM: CPT

## 2022-09-29 PROCEDURE — A9270 NON-COVERED ITEM OR SERVICE: HCPCS | Performed by: EMERGENCY MEDICINE

## 2022-09-29 PROCEDURE — 85025 COMPLETE CBC W/AUTO DIFF WBC: CPT

## 2022-09-29 PROCEDURE — 93005 ELECTROCARDIOGRAM TRACING: CPT | Performed by: EMERGENCY MEDICINE

## 2022-09-29 PROCEDURE — 70498 CT ANGIOGRAPHY NECK: CPT

## 2022-09-29 PROCEDURE — 80053 COMPREHEN METABOLIC PANEL: CPT

## 2022-09-29 PROCEDURE — 700117 HCHG RX CONTRAST REV CODE 255: Performed by: EMERGENCY MEDICINE

## 2022-09-29 PROCEDURE — 36415 COLL VENOUS BLD VENIPUNCTURE: CPT

## 2022-09-29 PROCEDURE — 85730 THROMBOPLASTIN TIME PARTIAL: CPT

## 2022-09-29 PROCEDURE — 700102 HCHG RX REV CODE 250 W/ 637 OVERRIDE(OP): Performed by: EMERGENCY MEDICINE

## 2022-09-29 PROCEDURE — 93005 ELECTROCARDIOGRAM TRACING: CPT

## 2022-09-29 PROCEDURE — 70496 CT ANGIOGRAPHY HEAD: CPT

## 2022-09-29 PROCEDURE — 96375 TX/PRO/DX INJ NEW DRUG ADDON: CPT

## 2022-09-29 PROCEDURE — 96365 THER/PROPH/DIAG IV INF INIT: CPT

## 2022-09-29 PROCEDURE — 85610 PROTHROMBIN TIME: CPT

## 2022-09-29 RX ORDER — POTASSIUM CHLORIDE 20 MEQ/1
20 TABLET, EXTENDED RELEASE ORAL 2 TIMES DAILY
Qty: 20 TABLET | Refills: 0 | Status: SHIPPED | OUTPATIENT
Start: 2022-09-29 | End: 2022-10-09

## 2022-09-29 RX ORDER — POTASSIUM CHLORIDE 7.45 MG/ML
10 INJECTION INTRAVENOUS ONCE
Status: COMPLETED | OUTPATIENT
Start: 2022-09-29 | End: 2022-09-29

## 2022-09-29 RX ORDER — PROCHLORPERAZINE EDISYLATE 5 MG/ML
INJECTION INTRAMUSCULAR; INTRAVENOUS
Status: DISCONTINUED
Start: 2022-09-29 | End: 2022-09-29 | Stop reason: HOSPADM

## 2022-09-29 RX ORDER — DIPHENHYDRAMINE HYDROCHLORIDE 50 MG/ML
25 INJECTION INTRAMUSCULAR; INTRAVENOUS ONCE
Status: COMPLETED | OUTPATIENT
Start: 2022-09-29 | End: 2022-09-29

## 2022-09-29 RX ORDER — POTASSIUM CHLORIDE 20 MEQ/1
40 TABLET, EXTENDED RELEASE ORAL DAILY
Status: DISCONTINUED | OUTPATIENT
Start: 2022-09-29 | End: 2022-09-29 | Stop reason: HOSPADM

## 2022-09-29 RX ORDER — DIPHENHYDRAMINE HYDROCHLORIDE 50 MG/ML
INJECTION INTRAMUSCULAR; INTRAVENOUS
Status: DISCONTINUED
Start: 2022-09-29 | End: 2022-09-29 | Stop reason: HOSPADM

## 2022-09-29 RX ORDER — POTASSIUM CHLORIDE 7.45 MG/ML
INJECTION INTRAVENOUS
Status: DISCONTINUED
Start: 2022-09-29 | End: 2022-09-29 | Stop reason: HOSPADM

## 2022-09-29 RX ORDER — PROCHLORPERAZINE EDISYLATE 5 MG/ML
10 INJECTION INTRAMUSCULAR; INTRAVENOUS ONCE
Status: COMPLETED | OUTPATIENT
Start: 2022-09-29 | End: 2022-09-29

## 2022-09-29 RX ADMIN — IOHEXOL 75 ML: 350 INJECTION, SOLUTION INTRAVENOUS at 09:58

## 2022-09-29 RX ADMIN — FENTANYL CITRATE 50 MCG: 50 INJECTION, SOLUTION INTRAMUSCULAR; INTRAVENOUS at 09:34

## 2022-09-29 RX ADMIN — POTASSIUM CHLORIDE 10 MEQ: 7.46 INJECTION, SOLUTION INTRAVENOUS at 11:32

## 2022-09-29 RX ADMIN — PROCHLORPERAZINE EDISYLATE 10 MG: 5 INJECTION INTRAMUSCULAR; INTRAVENOUS at 09:34

## 2022-09-29 RX ADMIN — POTASSIUM CHLORIDE 40 MEQ: 1500 TABLET, EXTENDED RELEASE ORAL at 11:15

## 2022-09-29 RX ADMIN — DIPHENHYDRAMINE HYDROCHLORIDE 25 MG: 50 INJECTION INTRAMUSCULAR; INTRAVENOUS at 09:34

## 2022-09-29 ASSESSMENT — FIBROSIS 4 INDEX: FIB4 SCORE: 0.84

## 2022-09-29 ASSESSMENT — LIFESTYLE VARIABLES: DO YOU DRINK ALCOHOL: NO

## 2022-09-29 NOTE — DISCHARGE INSTRUCTIONS
Return here if you develop new or worsening symptoms.  Make sure you are getting plenty of sleep and rest over the next couple of days and stay well-hydrated.  Call the neurology clinic and your primary care doctor today and arrange office follow-up visits in both clinics as soon as possible.

## 2022-09-29 NOTE — ED NOTES
[employer] [employer] [facility] is in the process of having legal documents drawn up. She will bring her completed AMD for scanning when it is ready. She says she has named 2 healthcare agents: 
 
Gilda Morales, son, [phone] Marielena Huynh, friend, [phone] Anthony Ashton, FREDERIC, LMSW, [facility]  Social Work Supervisee for [facility] Palliative Medicine 
[phone] Report given to nurse Acosta

## 2022-09-29 NOTE — Clinical Note
Tanya Augustin Gaston was seen and treated in our emergency department on 9/29/2022.  She may return to work on 10/03/2022.       If you have any questions or concerns, please don't hesitate to call.      Naun Shelton M.D.

## 2022-09-29 NOTE — ED PROVIDER NOTES
ED Provider Note    CHIEF COMPLAINT  Chief Complaint   Patient presents with    Headache     Reporting 10/10 pain in the occipital region.        HPI  Tanya Barrow is a 47 y.o. female who presents to the emergency department complaining of headache, dizziness and numbness and tingling on the left side of her body.  The patient states that she was here for the same thing last week and was evaluated but is having a recurrent episode this morning.  The patient speaks some English but primarily Maori and a family member is interpreting for her.  Apparently the patient just started working night shifts and she has been having a lot of difficulty adjusting to this feeling poorly and not sleeping and she did not sleep at all yesterday.  She awoke with the symptoms, this was not a thunderclap headache, there is been no trauma.  All other systems negative    REVIEW OF SYSTEMS no fever or chills no nausea or vomiting no chest pain palpitations cough or difficulty breathing.  All other systems negative    PAST MEDICAL HISTORY  Past Medical History:   Diagnosis Date    Anemia     Blood clotting disorder (HCC)     Chest pain 02/2019    MPI with no ischemia or infarct, LVEF 72%.    Diabetes     Oral meds    GERD (gastroesophageal reflux disease)     Heart burn     Hemorrhagic disorder (HCC)     Hyperlipidemia     Hypertension 2006    Medication    Obesity     Palpitations     Urinary bladder disorder     Urinary incontinence        FAMILY HISTORY  Family History   Problem Relation Age of Onset    Diabetes Mother         pills    Hypertension Mother     Diabetes Father         insulin    Diabetes Paternal Grandmother     Diabetes Paternal Aunt        SOCIAL HISTORY  Social History     Socioeconomic History    Marital status: Single   Tobacco Use    Smoking status: Never    Smokeless tobacco: Never   Vaping Use    Vaping Use: Never used   Substance and Sexual Activity    Alcohol use: No    Drug use: No    Sexual  activity: Yes     Partners: Male       SURGICAL HISTORY  Past Surgical History:   Procedure Laterality Date    CHOLECYSTECTOMY ROBOTIC XI  12/9/2020    Procedure: CHOLECYSTECTOMY, ROBOT-ASSISTED, USING DA ROSA XI;  Surgeon: Nicho Copeland M.D.;  Location: SURGERY [facility];  Service: Gen Robotic    ANTERIOR AND POSTERIOR REPAIR  2/22/2018    Procedure: ANTERIOR AND POSTERIOR REPAIR;  Surgeon: Angel Hernadez M.D.;  Location: SURGERY [facility];  Service: Gynecology    ENTEROCELE REPAIR  2/22/2018    Procedure: ENTEROCELE REPAIR;  Surgeon: Angel Hernadez M.D.;  Location: SURGERY SAME DAY [facility];  Service: Gynecology    VAGINAL SUSPENSION N/A 2/22/2018    Procedure: VAGINAL SUSPENSION- SACROSPINOUS VAULT;  Surgeon: Angel Hernadez M.D.;  Location: SURGERY SAME DAY [facility];  Service: Gynecology    BLADDER SLING FEMALE N/A 2/22/2018    Procedure: BLADDER SLING FEMALE- TENSION FREE VAGINAL TAPE;  Surgeon: Angel Hernadez M.D.;  Location: SURGERY SAME DAY [facility];  Service: Gynecology    BLADDER SLING FEMALE  10/17/2017    Procedure: BLADDER SLING FEMALE - TENSION FREE TAPE PROCEDURE;  Surgeon: Angel Hernadez M.D.;  Location: SURGERY SAME [facility];  Service: Gynecology    CYSTOSCOPY N/A 10/17/2017    Procedure: CYSTOSCOPY;  Surgeon: Angel Hernadez M.D.;  Location: SURGERY [facility];  Service: Gynecology    ANTERIOR AND POSTERIOR REPAIR  10/17/2017    Procedure: ANTERIOR AND POSTERIOR REPAIR W/UPHOLD MESH;  Surgeon: Angel Hernadez M.D.;  Location: SURGERY SAME [facility];  Service: Gynecology    ENTEROCELE REPAIR  10/17/2017    Procedure: ENTEROCELE REPAIR - PERINEOPLASTY;  Surgeon: Angel Hernadez M.D.;  Location: SURGERY SAME [facility];  Service: Gynecology    VAGINAL SUSPENSION  10/17/2017    Procedure: VAGINAL SUSPENSION - SACROSPINOUS VAULT;  Surgeon: Angel Hernadez M.D.;  Location: SURGERY SAME [facility];  Service: Gynecology     BLADDER SLING FEMALE  4/11/2017    Procedure: BLADDER SLING FEMALE-TOT;  Surgeon: Angel Hernadez M.D.;  Location: SURGERY [facility] DAY [facility];  Service:     ANTERIOR AND POSTERIOR REPAIR  4/11/2017    Procedure: ANTERIOR AND POSTERIOR REPAIR;  Surgeon: Angel Hernadez M.D.;  Location: SURGERY [facility] DAY [facility];  Service:     ENTEROCELE REPAIR  4/11/2017    Procedure: ENTEROCELE REPAIR- PERINEOPLASTY;  Surgeon: Angel Hernadez M.D.;  Location: SURGERY SAME DAY [facility];  Service:     VAGINAL SUSPENSION  4/11/2017    Procedure: VAGINAL SUSPENSION-SACROSPINOUS VAULT;  Surgeon: Angel Hernadez M.D.;  Location: SURGERY SAME DAY [facility];  Service:     BLADDER SLING FEMALE  10/25/2016    Procedure: BLADDER SLING FEMALE TOT;  Surgeon: Angel Hernadez M.D.;  Location: SURGERY SAME DAY [facility];  Service:     VAGINAL HYSTERECTOMY SCOPE TOTAL  10/25/2016    Procedure: VAGINAL HYSTERECTOMY SCOPE TOTAL;  Surgeon: Angel Hernadez M.D.;  Location: SURGERY SAME DAY [facility];  Service:     SALPINGECTOMY Bilateral 10/25/2016    Procedure: SALPINGECTOMY;  Surgeon: Angel Hernadez M.D.;  Location: SURGERY SAME DAY [facility];  Service:     ANTERIOR AND POSTERIOR REPAIR  10/25/2016    Procedure: ANTERIOR AND POSTERIOR REPAIR;  Surgeon: Angel Hernadez M.D.;  Location: SURGERY SAME DAY [facility];  Service:     ENTEROCELE REPAIR  10/25/2016    Procedure: ENTEROCELE REPAIR, PERINEOPLASTY;  Surgeon: Angel Hernadez M.D.;  Location: SURGERY SAME [facility];  Service:     VAGINAL SUSPENSION  10/25/2016    Procedure: VAGINAL SUSPENSION SACROSPINOUS VAULT ;  Surgeon: Angel Hernadez M.D.;  Location: SURGERY SAME DAY [facility];  Service:     OTHER      Tubal ligation       CURRENT MEDICATIONS  Home Medications    **Home medications have not yet been reviewed for this encounter**         ALLERGIES  Allergies   Allergen Reactions    Latex Rash     Condons, rash  Latex gloves, rash       PHYSICAL  "EXAM  VITAL SIGNS: /82   Pulse 66   Temp 36.7 °C (98 °F) (Temporal)   Resp 16   Ht 1.651 m (5' 5\")   Wt 92.8 kg (204 lb 9.4 oz)   LMP 10/13/2016 (Exact Date)   SpO2 96%   BMI 34.05 kg/m²    Oxygen saturation is interpreted as adequate  Constitutional: The patient is awake she is able to verbalize but quite quiet at the time of arrival although this did improve when she was feeling better.  She does not appear toxic.   HENT: No sign of trauma to the head  Eyes: No erythema discharge or jaundice pupils are round extraocular motion is present and normal  Neck: Trachea midline no JVD no meningeal findings  Cardiovascular: Regular rate and rhythm  Lungs: Clear and equal bilaterally with no apparent difficulty breathing  Abdomen/Back: Soft nontender nondistended no rebound guarding or peritoneal findings  Skin: Warm and dry  Musculoskeletal: No leg edema or calf tenderness  Neurologic: Patient is awake she is verbal she is moving all extremities, there is no facial droop or weakness.  The patient feels there is less intensity of sensation on the left side of her body compared to the right.  There are no cerebellar findings.    CHART REVIEW  As noted above the patient was just recently here with similar symptoms I reviewed the discharge summary from September 22 of this year the patient presented with left-sided facial numbness dizziness chest pain and was evaluated she had CT scanning of the head which was nondiagnostic and an MRI of the brain which according to the report showed an area of possible old hemorrhage or infarction but no acute findings.  The patient also had mildly depressed potassium ranging from 3.4-3.7 during her last hospitalization.    Laboratory  Today CBC shows white blood cell count of 5.8 hemoglobin is adequate at 13.9 basic metabolic panel shows a slightly low potassium of 3.0 blood glucose is elevated at 163 LFTs are unremarkable INR is normal    EKG interpretation  A twelve-lead EKG " shows sinus rhythm 49 bpm there is a Q-wave in lead III no pathologic ST elevation [place] interval is 171 ms QTc interval is 408 ms.  There is trace, nonspecific ST depression in V4 V5 and V6 but this may be influenced by generalized downsloping of the baseline in these leads    Radiology  CT-CTA HEAD WITH & W/O-POST PROCESS   Final Result      CT angiogram of the [place] within normal limits.      CT-CTA NECK WITH & W/O-POST PROCESSING   Final Result      CT angiogram of the neck within normal limits.        MEDICAL DECISION MAKING and DISPOSITION  In the emergency department an IV was established the patient was given intravenous Compazine and Benadryl and 50 mcg of intravenous fentanyl for pain and these measures led to resolution of her symptoms.  I have reexamined the patient and she has not unremarkable neurologic exam at the time of discharge.  In the ER I ordered a dose of intravenous potassium as well as oral supplemental potassium for the patient.  I reviewed the case with Dr. Valdez from neurology and at this point in time Dr. Valdez would like the patient to follow-up in the neurology headache clinic.  I think it is safe for the patient to go home I have reviewed everything in detail with her and her family and I sent a prescription for supplemental potassium to her pharmacy.  I have advised the patient to return here at once if she feels she is having new or worsening symptoms, she is to call her primary care doctor and arrange office recheck as soon as possible during the week and will need her potassium rechecked in the near future.  I have also advised her to call the neurology headache clinic and confirm follow-up appointment and I have placed a referral to neurology in the computer system.    IMPRESSION  1.  Headache syndrome  2.  Left-sided numbness  3.  Hypokalemia    Electronically signed by: Naun Shelton M.D., 9/29/2022 3:07 PM

## 2022-09-29 NOTE — ED NOTES
PIV removed. Discharge instructions discussed with pt, verbalizes understanding. All belongings with pt when leaving unit. Pt amb to lobby with steady gait.

## 2023-06-15 ENCOUNTER — HOSPITAL ENCOUNTER (OUTPATIENT)
Dept: CARDIOLOGY | Facility: MEDICAL CENTER | Age: 48
End: 2023-06-15
Attending: NURSE PRACTITIONER
Payer: MEDICAID

## 2023-06-29 ENCOUNTER — HOSPITAL ENCOUNTER (OUTPATIENT)
Dept: CARDIOLOGY | Facility: MEDICAL CENTER | Age: 48
End: 2023-06-29
Attending: NURSE PRACTITIONER
Payer: MEDICAID

## 2023-06-29 DIAGNOSIS — R94.31 ABNORMAL EKG: ICD-10-CM

## 2023-06-29 LAB — LV EJECT FRACT  99904: 60

## 2023-06-29 PROCEDURE — 93017 CV STRESS TEST TRACING ONLY: CPT

## 2023-06-29 PROCEDURE — 93350 STRESS TTE ONLY: CPT | Mod: 26 | Performed by: INTERNAL MEDICINE

## 2023-06-29 PROCEDURE — 93018 CV STRESS TEST I&R ONLY: CPT | Performed by: INTERNAL MEDICINE

## 2023-12-04 ENCOUNTER — APPOINTMENT (OUTPATIENT)
Dept: GYNECOLOGY | Facility: CLINIC | Age: 48
End: 2023-12-04
Payer: MEDICAID

## 2024-01-15 ENCOUNTER — GYNECOLOGY VISIT (OUTPATIENT)
Dept: OBGYN | Facility: CLINIC | Age: 49
End: 2024-01-15
Payer: MEDICAID

## 2024-01-15 VITALS — DIASTOLIC BLOOD PRESSURE: 84 MMHG | WEIGHT: 205 LBS | SYSTOLIC BLOOD PRESSURE: 126 MMHG | BODY MASS INDEX: 34.11 KG/M2

## 2024-01-15 DIAGNOSIS — N39.46 URINARY INCONTINENCE, MIXED: Primary | ICD-10-CM

## 2024-01-15 DIAGNOSIS — N39.42 INCONTINENCE WITHOUT SENSORY AWARENESS: ICD-10-CM

## 2024-01-15 DIAGNOSIS — N81.11 CYSTOCELE, MIDLINE: ICD-10-CM

## 2024-01-15 DIAGNOSIS — N94.10 DYSPAREUNIA IN FEMALE: ICD-10-CM

## 2024-01-15 DIAGNOSIS — R35.1 NOCTURIA: ICD-10-CM

## 2024-01-15 DIAGNOSIS — Z98.890 HISTORY OF SUBURETHRAL SLING PROCEDURE: ICD-10-CM

## 2024-01-15 DIAGNOSIS — N32.81 OAB (OVERACTIVE BLADDER): ICD-10-CM

## 2024-01-15 DIAGNOSIS — N95.2 VAGINAL ATROPHY: ICD-10-CM

## 2024-01-15 LAB
APPEARANCE UR: CLEAR
BILIRUB UR STRIP-MCNC: NEGATIVE MG/DL
COLOR UR AUTO: YELLOW
GLUCOSE UR STRIP.AUTO-MCNC: NORMAL MG/DL
KETONES UR STRIP.AUTO-MCNC: NEGATIVE MG/DL
LEUKOCYTE ESTERASE UR QL STRIP.AUTO: NEGATIVE
NITRITE UR QL STRIP.AUTO: NEGATIVE
PH UR STRIP.AUTO: 7 [PH] (ref 5–8)
PROT UR QL STRIP: NEGATIVE MG/DL
RBC UR QL AUTO: NORMAL
SP GR UR STRIP.AUTO: 1.01
UROBILINOGEN UR STRIP-MCNC: NORMAL MG/DL

## 2024-01-15 PROCEDURE — 99204 OFFICE O/P NEW MOD 45 MIN: CPT | Performed by: STUDENT IN AN ORGANIZED HEALTH CARE EDUCATION/TRAINING PROGRAM

## 2024-01-15 PROCEDURE — 81002 URINALYSIS NONAUTO W/O SCOPE: CPT | Performed by: STUDENT IN AN ORGANIZED HEALTH CARE EDUCATION/TRAINING PROGRAM

## 2024-01-15 PROCEDURE — 3074F SYST BP LT 130 MM HG: CPT | Performed by: STUDENT IN AN ORGANIZED HEALTH CARE EDUCATION/TRAINING PROGRAM

## 2024-01-15 PROCEDURE — 3079F DIAST BP 80-89 MM HG: CPT | Performed by: STUDENT IN AN ORGANIZED HEALTH CARE EDUCATION/TRAINING PROGRAM

## 2024-01-15 RX ORDER — EMPAGLIFLOZIN 25 MG/1
25 TABLET, FILM COATED ORAL DAILY
COMMUNITY

## 2024-01-15 ASSESSMENT — FIBROSIS 4 INDEX: FIB4 SCORE: 0.99

## 2024-01-15 NOTE — PROGRESS NOTES
"Urogynecology & Reconstructive Pelvic Surgery - Consultation Visit    Tanya Barrow MRN:[id number] :1975    Referred by: Cassie Bates DO    Reason for Visit:   Chief Complaint   Patient presents with    New Patient     Consult        Alexander [id number]    Subjective     History of Presenting Illness:    Tanya Barrow is a 48 y.o. year old P5 who presents for the evaluation and management of prolapse, severe incontinence, pelvic pain after multiple prior contracted surgeries.    She is currently most bothered by urinary incontinence with nearly all activities including exercise, urgency, and without sensation.  She has undergone 4 prior mid urethral sling procedures with Dr. Hernadez, none of which improved her symptoms and the last of which worsened her symptoms.    She also has a subjective vaginal bulge, which feels worse than prior surgeries.  She has had 4 native tissue prolapse repairs.  After one of the surgeries, she had difficulty emptying her rectum and had to have mesh removed from the anal area during a revision, per her report.    She also has significant dyspareunia      Prior Pelvic surgery:   10/25/2016: BISMARK LUO, anterior/posterior repair, enterocele repair, sacrospinous suspension, transobturator sling  2017: Anterior/posterior pair, sacrospinous suspension, transobturator sling  10/17/2017: Anterior/posterior repair, vault suspension, uphold mesh, as obturator sling ([facility])  2018: Anterior/posterior repair, \"tension-free tape\" (sounds like suprapubic sling from op note) ([facility])  2020: Robotic cholecystectomy     Prior treatment:   Pessary - reaction to material      Pelvic floor symptom review:     Bladder:   Voids per day: 15-20 Voids per night: 6-8      Urinary incontinence episodes per day: throughout the day    Urge leakage:  On Movement to Bathroom and Full Bladder   Stress leakage: With Cough, With Laugh, With Exercise, With " [place], With Bending/Squatting, Upon Standing, and Full Bladder   Continuous / insensible urine loss: Yes   Nocturnal enuresis: Yes   Leakage volume: Moderate   Number of pads/day: 7-10    Bladder emptying: Complete   Voiding symptoms: Post-Void Dribble and Double or Triple Voiding   UTI in last 12 months: no   Other urologic history: no      Prolapse:     Prolapse symptoms: Bulge and Pelvic Pressure   Degree of prolapse: To Introitus     Bowel:    Constipation: Yes   Bowel movements per day: 1    Straining to empty bowels: Yes   Splinting to evacuate: Yes   Painful evacuation: Yes   Difficulty emptying rectum: Yes   Incontinence to stool: no   Blood in stool: No    Hemorrhoids: No         Sexual function:    Sexually active: No    Gender of partners: Male   Pain with intercourse: yes,deep       Pelvic Pain: Yes, with walking/lifting      Past medical and surgical history    Past obstetric history   Number of vaginal deliveries: 4   Number of  deliveries: 0     Past gynecological history:    Last menstrual period: Patient's last menstrual period was 2010.   S/p hysterectomy      Past medical history:  Past Medical History:   Diagnosis Date    Chest pain 2019    MPI with no ischemia or infarct, LVEF 72%.    Hypertension 2006    Medication    Anemia     Blood clotting disorder (HCC)     Diabetes     Oral meds    GERD (gastroesophageal reflux disease)     Heart burn     Hemorrhagic disorder (HCC)     Hyperlipidemia     Obesity     Palpitations     Urinary bladder disorder     Urinary incontinence      Past surgical history:  Past Surgical History:   Procedure Laterality Date    CHOLECYSTECTOMY ROBOTIC XI  2020    Procedure: CHOLECYSTECTOMY, ROBOT-ASSISTED, USING DA ROSA XI;  Surgeon: Nicho Copeland M.D.;  Location: SURGERY [facility];  Service: Gen Robotic    ANTERIOR AND POSTERIOR REPAIR  2018    Procedure: ANTERIOR AND POSTERIOR REPAIR;  Surgeon: Angel Hernadez M.D.;  Location:  SURGERY SAME DAY [facility];  Service: Gynecology    ENTEROCELE REPAIR  2/22/2018    Procedure: ENTEROCELE REPAIR;  Surgeon: Angel Hernadez M.D.;  Location: SURGERY SAME DAY [facility];  Service: Gynecology    VAGINAL SUSPENSION N/A 2/22/2018    Procedure: VAGINAL SUSPENSION- SACROSPINOUS VAULT;  Surgeon: Angel Hernadez M.D.;  Location: SURGERY SAME DAY [facility];  Service: Gynecology    BLADDER SLING FEMALE N/A 2/22/2018    Procedure: BLADDER SLING FEMALE- TENSION FREE VAGINAL TAPE;  Surgeon: Angel Hernadez M.D.;  Location: SURGERY SAME DAY [facility];  Service: Gynecology    BLADDER SLING FEMALE  10/17/2017    Procedure: BLADDER SLING FEMALE - TENSION FREE TAPE PROCEDURE;  Surgeon: Angel Hernadez M.D.;  Location: SURGERY SAME DAY [facility];  Service: Gynecology    CYSTOSCOPY N/A 10/17/2017    Procedure: CYSTOSCOPY;  Surgeon: Angel Hernadez M.D.;  Location: SURGERY SAME DAY [facility];  Service: Gynecology    ANTERIOR AND POSTERIOR REPAIR  10/17/2017    Procedure: ANTERIOR AND POSTERIOR REPAIR W/UPHOLD MESH;  Surgeon: Angel Hernadez M.D.;  Location: SURGERY SAME DAY [facility];  Service: Gynecology    ENTEROCELE REPAIR  10/17/2017    Procedure: ENTEROCELE REPAIR - PERINEOPLASTY;  Surgeon: Angel Hernadez M.D.;  Location: SURGERY SAME DAY [facility];  Service: Gynecology    VAGINAL SUSPENSION  10/17/2017    Procedure: VAGINAL SUSPENSION - SACROSPINOUS VAULT;  Surgeon: Angel Hernadez M.D.;  Location: SURGERY SAME DAY [facility];  Service: Gynecology    BLADDER SLING FEMALE  4/11/2017    Procedure: BLADDER SLING FEMALE-TOT;  Surgeon: Angel Hernadez M.D.;  Location: SURGERY SAME DAY [facility];  Service:     ANTERIOR AND POSTERIOR REPAIR  4/11/2017    Procedure: ANTERIOR AND POSTERIOR REPAIR;  Surgeon: Angel Hernadez M.D.;  Location: SURGERY SAME DAY [facility];  Service:     ENTEROCELE REPAIR  4/11/2017    Procedure: ENTEROCELE REPAIR- PERINEOPLASTY;  Surgeon: Angel REBOLLEDO  CHARI Hernadez;  Location: SURGERY SAME DAY [facility];  Service:     VAGINAL SUSPENSION  4/11/2017    Procedure: VAGINAL SUSPENSION-SACROSPINOUS VAULT;  Surgeon: Angel Hernadez M.D.;  Location: SURGERY SAME DAY [facility];  Service:     BLADDER SLING FEMALE  10/25/2016    Procedure: BLADDER SLING FEMALE TOT;  Surgeon: Angel Hernadez M.D.;  Location: SURGERY SAME DAY [facility];  Service:     VAGINAL HYSTERECTOMY SCOPE TOTAL  10/25/2016    Procedure: VAGINAL HYSTERECTOMY SCOPE TOTAL;  Surgeon: Angel Hernadez M.D.;  Location: SURGERY SAME DAY [facility];  Service:     SALPINGECTOMY Bilateral 10/25/2016    Procedure: SALPINGECTOMY;  Surgeon: Angel Hernadez M.D.;  Location: SURGERY SAME DAY [facility];  Service:     ANTERIOR AND POSTERIOR REPAIR  10/25/2016    Procedure: ANTERIOR AND POSTERIOR REPAIR;  Surgeon: Angel Hernadez M.D.;  Location: SURGERY SAME DAY [facility];  Service:     ENTEROCELE REPAIR  10/25/2016    Procedure: ENTEROCELE REPAIR, PERINEOPLASTY;  Surgeon: Angel Hernadez M.D.;  Location: SURGERY SAME DAY [facility];  Service:     VAGINAL SUSPENSION  10/25/2016    Procedure: VAGINAL SUSPENSION SACROSPINOUS VAULT ;  Surgeon: Angel Hernadez M.D.;  Location: SURGERY SAME DAY [facility];  Service:     OTHER      Tubal ligation     Medications:has a current medication list which includes the following prescription(s): jardiance, omeprazole, chlorthalidone, metoprolol sr, losartan, atorvastatin, insulin glargine, aspirin ec, and metformin.  Allergies:Latex  Family history:  Family History   Problem Relation Age of Onset    Diabetes Mother         pills    Hypertension Mother     Diabetes Father         insulin    Diabetes Paternal Grandmother     Diabetes Paternal Aunt      Social history: reports that she has never smoked. She has never used smokeless tobacco. She reports that she does not drink alcohol and does not use drugs.    Review of systems: A full review of systems was  performed, and negative with the exception of want is noted above in the HPI.        Objective        /84 (BP Location: Right arm, Patient Position: Sitting, BP Cuff Size: Large adult)   Wt 205 lb   LMP 01/12/2010   BMI 34.11 kg/m²     Physical Exam  Vitals reviewed. Exam conducted with a chaperone present (MA - see notes.).   Constitutional:       Appearance: Normal appearance.   HENT:      Head: Normocephalic.      Mouth/Throat:      Mouth: Mucous membranes are moist.   Cardiovascular:      Rate and Rhythm: Normal rate.   Pulmonary:      Effort: Pulmonary effort is normal.   Abdominal:      Palpations: Abdomen is soft. There is no mass.      Tenderness: There is no abdominal tenderness.   Skin:     General: Skin is warm and dry.   Neurological:      Mental Status: She is alert.   Psychiatric:         Mood and Affect: Mood normal.         Genitourinary:    Vulva: WNL   Bulbocavernosus reflex: Intact   Anal wink reflex: Intact   Perineal sensation: WNL   Urethra: Atrophic - no mesh visible. Pain reproduced on palpation of sling and obturator internus fascia   Vagina: Atrophic   Atrophy: Moderate   Cough stress test: Positive    Pelvic floor:    POP-Q: Aa -0.5 / Ba -0.5 / TVL 7 / C -4 / D x / Ap -1 / Bp -1 / GH 4 / PB 2   Prolapse stage: 2   Paravaginal defect: mild   Cervical elongation: No    Urethral tenderness: No    Bladder/ suprapubic tenderness: Yes   Levator tenderness: Bilateral   Levator muscle tone: Hypertonic   Pelvic floor contraction strength (modified Oxford scale): 2=Weak   Pelvic floor contraction duration: Brief    Bimanual exam: no masses    Procedure Performed: No    Diagnostic test and records review:    Labs: na/    Radiology: n/a    Procedural: n/a    Documentation reviewed: Prior EMR Records, op reports (see HPI)           Assessment & Plan     Tanya Barrow is a 48 y.o. year old P5 with severe mixed urinary incontinence/OAB after 4 prior sling procedures, chronic  pelvic pain/dyspareunia, mild cystocele. We discussed my recommendations for further diagnosis and treatment at length today.     She was given overall counseling that trying to undo the symptoms that she is currently feeling from her multiple prior surgeries often takes multiple steps.  Given the mild prolapse on exam and severe urinary symptoms, I think it is best to address her urinary function first, as well as her pain.    1. Urinary incontinence, mixed, OAB, nocturia  2. Incontinence without sensory awareness  3. History of suburethral sling procedure x4 (toMUSx3, rMUSx1)  She has severe urinary incontinence with both stress components, urge components, and incontinence without sensory awareness, significant urinary frequency and nocturia.  All the symptoms were exacerbated by sling procedures in the past, symptoms never improved.  Due to her multiple mesh slings as well as refractory symptoms, I would not recommend any treatment aside from physical therapy without performing both urodynamics and cystoscopy.  Urodynamics to look at her function and rule out obstruction, evaluate for detrusor overactivity, and urethral strength.  Cystoscopy will rule out mesh exposure into the bladder or any organic causes for her urinary leakage.  I strongly suspect, that given her urinary symptoms as well has her pain, that mesh excision and detensioning of the levator complex are can be the most important first step, however I am not comfortable offering this until I understand her urodynamic parameters.  I counseled today that after sling excision, she may have a persistent or worsening urinary incontinence, but I would prefer to allow healing and then potentially readdressing this, if necessary.  Multistep treatment course is very likely  - Urodynamics/cystourethroscopy, next available  - Referral to Physical Therapy      4. Cystocele, midline  Despite significant Valsalva effort, I was only able to bring out a stage II  cystocele with the Aa point at -0.5.  She also has significant atrophy and scarring from her prior surgeries.  I do not think correction of prolapse will significantly affect her urinary function, and the only option for prolapse repair at this point would be a sacrocolpopexy given the multiple prior failed native tissue repairs.  I counseled that I would recommend only observing her prolapse at this time, and continuing to use vaginal estrogen to improve tissue quality.  If prolapse is worse on future examinations or worse after addressing her urinary incontinence function, sacrocolpopexy may be our best next step.    5. Dyspareunia in female  Significant high tone pelvic floor and exacerbation of pain on palpation of the obturator internus fascia and sling insertion sites.  This leads me to believe that tensioning from her prior sling procedures is contributing to global pelvic floor dysfunction and likely her dyspareunia and chronic pelvic pain.  As noted above, I recommend likely excision of her sling after evaluation of her bladder function  - Referral to Physical Therapy    6. Vaginal atrophy  Continue vaginal estrogen, increase to daily for 2 weeks then back to twice per week to have quicker improvement in tissue quality    7. Chronic constipation  This is a longstanding issue.  Due to her constipation I do not recommend trial of anticholinergics before her bladder testing, as they may worsen her bowel function             Torie Maurice MD, FACOG  Urogynecology and Reconstructive Pelvic Surgery  Department of Obstetrics and Gynecology  [facility]        This medical record contains text that has been entered with the assistance of computer voice recognition and dictation software.  Therefore, it may contain unintended errors in text, spelling, punctuation, or grammar

## 2024-01-15 NOTE — PROGRESS NOTES
PT here today for consult   Ref for Prolase; HX of Hysterectomy and Sling with Hernadez   Hysterectomy? 2016  Good #: [phone] (home)   PVR : N/A  Pharmacy Verified

## 2024-01-23 NOTE — PROCEDURES
Procedure note: Complex urodynamic testing    UA positive for nitrites.  Will send urine for culture.  Tentatively will treat with Macrobid twice daily x 5 days.  Cystoscopy and UDS were therefore canceled and rescheduled.  Reviewed with Dr. Triana.    BEN Cortes.

## 2024-01-30 ENCOUNTER — HOSPITAL ENCOUNTER (OUTPATIENT)
Facility: MEDICAL CENTER | Age: 49
End: 2024-01-30
Attending: NURSE PRACTITIONER
Payer: MEDICAID

## 2024-01-30 ENCOUNTER — PROCEDURE VISIT (OUTPATIENT)
Dept: GYNECOLOGY | Facility: CLINIC | Age: 49
End: 2024-01-30
Payer: MEDICAID

## 2024-01-30 VITALS
DIASTOLIC BLOOD PRESSURE: 80 MMHG | BODY MASS INDEX: 34.11 KG/M2 | HEART RATE: 70 BPM | SYSTOLIC BLOOD PRESSURE: 159 MMHG | WEIGHT: 205 LBS

## 2024-01-30 DIAGNOSIS — N39.42 INCONTINENCE WITHOUT SENSORY AWARENESS: ICD-10-CM

## 2024-01-30 DIAGNOSIS — N39.0 ACUTE UTI: ICD-10-CM

## 2024-01-30 DIAGNOSIS — Z98.890 HISTORY OF SUBURETHRAL SLING PROCEDURE: ICD-10-CM

## 2024-01-30 DIAGNOSIS — N39.46 URINARY INCONTINENCE, MIXED: ICD-10-CM

## 2024-01-30 LAB
FORWARD REASON: SPWHY: NORMAL
FORWARDED TO LAB: SPWHR: NORMAL
SPECIMEN SENT (2ND): SPWT2: NORMAL
SPECIMEN SENT (3RD): SPWT3: NORMAL
SPECIMEN SENT (4TH): SPWT4: NORMAL
SPECIMEN SENT: SPWT1: NORMAL

## 2024-01-30 PROCEDURE — 99999 PR NO CHARGE: CPT | Performed by: NURSE PRACTITIONER

## 2024-01-30 RX ORDER — NITROFURANTOIN 25; 75 MG/1; MG/1
100 CAPSULE ORAL 2 TIMES DAILY
Qty: 10 CAPSULE | Refills: 0 | Status: SHIPPED | OUTPATIENT
Start: 2024-01-30 | End: 2024-02-04

## 2024-01-30 ASSESSMENT — FIBROSIS 4 INDEX: FIB4 SCORE: 0.99

## 2024-01-30 NOTE — PROGRESS NOTES
Pt here for UDS + Cysto   Pt states burning and itching, high urinary frequency   Hysterectomy: 2018, 2020 (4 surgeries)  Pharmacy confirmed.   Good # [phone] (home)

## 2024-02-05 PROCEDURE — 52000 CYSTOURETHROSCOPY: CPT | Performed by: STUDENT IN AN ORGANIZED HEALTH CARE EDUCATION/TRAINING PROGRAM

## 2024-02-06 ENCOUNTER — PROCEDURE VISIT (OUTPATIENT)
Dept: GYNECOLOGY | Facility: CLINIC | Age: 49
End: 2024-02-06
Attending: STUDENT IN AN ORGANIZED HEALTH CARE EDUCATION/TRAINING PROGRAM
Payer: MEDICAID

## 2024-02-06 VITALS — WEIGHT: 205 LBS | SYSTOLIC BLOOD PRESSURE: 150 MMHG | DIASTOLIC BLOOD PRESSURE: 83 MMHG | BODY MASS INDEX: 34.11 KG/M2

## 2024-02-06 DIAGNOSIS — N39.46 URINARY INCONTINENCE, MIXED: ICD-10-CM

## 2024-02-06 DIAGNOSIS — R35.1 NOCTURIA: ICD-10-CM

## 2024-02-06 DIAGNOSIS — N94.10 DYSPAREUNIA IN FEMALE: ICD-10-CM

## 2024-02-06 DIAGNOSIS — N39.42 INCONTINENCE WITHOUT SENSORY AWARENESS: ICD-10-CM

## 2024-02-06 DIAGNOSIS — Z98.890 HISTORY OF SUBURETHRAL SLING PROCEDURE: ICD-10-CM

## 2024-02-06 LAB
APPEARANCE UR: CLEAR
BILIRUB UR STRIP-MCNC: NEGATIVE MG/DL
COLOR UR AUTO: YELLOW
GLUCOSE UR STRIP.AUTO-MCNC: 1000 MG/DL
KETONES UR STRIP.AUTO-MCNC: NEGATIVE MG/DL
LEUKOCYTE ESTERASE UR QL STRIP.AUTO: NEGATIVE
NITRITE UR QL STRIP.AUTO: NEGATIVE
PH UR STRIP.AUTO: 6.5 [PH] (ref 5–8)
PROT UR QL STRIP: NEGATIVE MG/DL
RBC UR QL AUTO: NEGATIVE
SP GR UR STRIP.AUTO: 1.01
UROBILINOGEN UR STRIP-MCNC: 0.2 MG/DL

## 2024-02-06 PROCEDURE — 51729 CYSTOMETROGRAM W/VP&UP: CPT | Performed by: NURSE PRACTITIONER

## 2024-02-06 PROCEDURE — 51784 ANAL/URINARY MUSCLE STUDY: CPT | Performed by: NURSE PRACTITIONER

## 2024-02-06 PROCEDURE — 51797 INTRAABDOMINAL PRESSURE TEST: CPT | Performed by: NURSE PRACTITIONER

## 2024-02-06 PROCEDURE — 51741 ELECTRO-UROFLOWMETRY FIRST: CPT | Performed by: NURSE PRACTITIONER

## 2024-02-06 PROCEDURE — 81002 URINALYSIS NONAUTO W/O SCOPE: CPT | Performed by: NURSE PRACTITIONER

## 2024-02-06 ASSESSMENT — FIBROSIS 4 INDEX: FIB4 SCORE: 0.99

## 2024-02-06 NOTE — LETTER
February 6, 2024         Patient: Tanya Barrow   YOB: 1975   Date of Visit: 2/6/2024           To Whom it May Concern:    Tanya Barrow was seen in my clinic on 2/6/2024. She may return to work.    If you have any questions or concerns, please don't hesitate to call.        Sincerely,           BEN Cortes.  Electronically Signed

## 2024-02-06 NOTE — PROGRESS NOTES
Pt here for UDS + Cysto  Pt states no UTI symptoms   Pharmacy confirmed.   Good # [phone] (home)

## 2024-02-06 NOTE — PATIENT INSTRUCTIONS
Pre-op: What you should know before your surgery  Excision of vaginal mesh      The purpose of the surgery is to remove the exposed vaginal mesh with a goal of improving your symptoms, which may include bleeding, pain, painful sex, or infection. Mesh cannot always be excised completely. Resection of mesh does not always result in resolution of pelvic pain and could result in recurrent prolapse and/or urinary incontinence. Due to natural scarring around the mesh, there is an increased risk of damage to the surrounding structures during surgical dissection.        General risks of pelvic reconstructive surgery: Specific risks for your procedure are discussed separately    Anesthesia: With modern anesthetics and monitoring equipment, complications due to anesthesia are very rare. Your anesthesiologist will discuss what will be most suitable for you on the day of surgery  Bleeding: all surgery leads to bleeding, however this surgery usually amounts to blood loss between a tablespoon and a small cup.  Large amount of blood loss that require a blood transfusion are not common in sling surgery (<1%).  Blood transfusion, if needed, is safe. Minor reactions like fever or allergy occur in less than 1% of transfusions. Risks of an infection or mismatched blood is very rare (less then 1 out of every 100,000 transfusions)  Infection: The most common infection with prolapse surgery is a urinary tract infection (UTI) which is easily treated. Infection of the incisions is very uncommon. Risk factors for infections and wound complications include diabetes, smoking, obesity and other chronic illnesses.  Blood Clots: Clots in the blood vessels of the legs/lungs are potentially dangerous and can occur in patients undergoing this type of surgery. This is prevented with leg compression during surgery, and sometimes an injected blood thinner. Staying mobile after surgery is strongly encouraged in all patients, and is  important in  preventing clots.  Damage to surrounding organs. The pelvic organs are all very close together. The risk of damage to other organs increases if you have had prior pelvic surgeries, or a prior history of endometriosis, or pelvic infection. These all lead to formation of scar tissue in the pelvis which can cause organs to stick together, making the surgery more difficult.    Ureter and bladder damage: The ureters are tubes that carry urine from the kidneys to the bladder, and they travel very close to the uterus and vagina. The bladder is attached to both your uterus and the front of the vagina. Damage can happen during a hysterectomy (1-2% chance), or during prolapse repair procedures. A cystoscopy (camera in the bladder) is performed during your surgery to ensure there is no bladder or ureter damage. If injury occurs, it  may require a stent (drainage tube) to be placed temporarily, or in rare cases require a larger abdominal incision to repair. A bladder catheter may need to stay in place temporarily after surgery.  Bowel damage: This is uncommon during your surgery.   Vascular damage: major damage to blood vessels is uncommon. If this occurs it may require a larger surgery and/or blood transfusion.  Fistula: A fistula is an abnormal connection between the vagina, and either the bladder or rectum, leading to leakage of urine or stool. These are rare complications that arise during healing from pelvic surgery, and sometimes require additional surgeries to correct. Great care is made during your surgery to prevent these fistulas. If they do occur, your Urogynecologist is the leading expert in repairing fistulas.      Post-op: What to expect after your surgery       Recovery room  You can expect to stay in the recovery room for a few hours until you are alert. There may be a gauze packing in your vagina, which will be removed before you go home. Pain is normal after surgery but should be tolerable. Your pain will  become less over the first 2 weeks. If your pain is difficult to control or you need more nursing care, you will stay in the hospital overnight. Most patients do very well going home on the day of surgery.     Bladder function  You will wake up with a small tube (catheter) in your bladder. A bladder test, called a “void trial”, will be performed by the nurse before you go home. The test is done to make sure you can empty your bladder normally. To do the bladder test, the nurse will fill your bladder with sterile water, remove the catheter, and give you 30 minutes to try and urinate (pee) on your own. If you cannot urinate, or if you only urinate a small amount, you will need to:   Go home with a catheter that stays in your bladder OR  Learn to put a catheter into your bladder a few times a day to empty it    Use of a catheter is temporary and usually needed for 2-4 days. It is very common for the bladder to work slowly, or sluggishly, after this type of surgery. One in every 3-4 patients will require some type of catheterization. An antibiotic may be prescribed while the catheter is in place to decrease the risk of infection.    Call the surgeon for treatment, if you have signs and symptoms of a urinary tract infection, including:  Burning with urination  Bladder pain  Worsening need to urinate right away,  Urine with a bad smell    Vaginal care  Do not go swimming, take sitting baths, and have sexual intercourse for 6 weeks after surgery Do not place anything in your vagina except vaginal estrogen cream, if instructed to do so by your surgeon.     Vaginal discharge and bleeding/spotting is also normal through the entire 6-week recovery. Sometimes small sutures will fall out of the vagina as they dissolve. This is normal. Contact the office with any heavy bleeding, foul discharge or worsening pain.     Pain management  Surgical pain is controlled in most patients with only acetaminophen (Tylenol) and non-steroidal  anti-inflammatory drugs (NSAIDs), such as ibuprofen (Advil). These drugs can be taken together because they do not interact with each other. Check your prescription for specific dosing instructions. A cold compress can help with pain in the vaginal area.  Sometimes, a short course of narcotics, such as oxycodone or hydromorphone is required. We do not recommend using narcotics regularly as it can lead to constipation or dizziness and falls.     Do not drive or operate heavy machinery while using narcotics. You are unlikely to become addicted if you need to take a narcotic medication a few times within the first week of your surgery.  After the first few days to one week, your pain should decrease and you should not have pain severe enough to need narcotics. If you continue having severe pain, contact your surgeon for re-evaluation.     Bowel function  Constipation is common after surgery. This means it may take several days before having a normal stool, or bowel movement. It is important to take extra steps to keep your stools soft to avoid straining with bowel movements. Straining could damage the prolapse repair before it has healed. Most patients will be given a prescription for a stool softener (docusate) as well as a gentle laxative (Miralax or lactulose). These drugs add water to the stool to make it easier to pass. Take them daily throughout your recovery. Hold for a day if you develop diarrhea.     Call us if you experience any repeated episodes of vomiting, worsening abdominal pain/bloating, or are unable to have a bowel movement for more than 3 days.        Return to sex  When your surgeon clears you to resume sex (if desired), begin slowly and use enough lubrication to help ease the discomfort.  Because your vagina and pelvis have been fixed, or re-structured, it can take time to get used to sex after surgery. As scar tissue softens over time, you will feel less discomfort. If the discomfort does not go  away, contact the office to schedule an exam and discuss other options. In some cases, your surgeon may prescribe a low dose of vaginal estrogen to help with vaginal dryness and pain that may happen with sex.

## 2024-02-06 NOTE — PROCEDURES
Procedure note: Complex urodynamic testing    Procedure performed:    -     85074 Complex Uroflowmetry  24935 Complex CMG w/ voiding pressure study AND urethral pressure  12724 EMG studies anal or urethral sphincter   97530 Intraabdominal catheter       Indication: Tanya Barrow is a 48 y.o. year old with Urinary incontinence, mixed, OAB, nocturia. Incontinence without sensory awareness. History of suburethral sling procedure x4 (toMUSx3, rMUSx1).     Bladder symptoms:               Voids per day: 15-20   Voids per night: 6-8                 Urinary incontinence episodes per day: throughout the day               Urge leakage:  On Movement to Bathroom and Full Bladder              Stress leakage: With Cough, With Laugh, With Exercise, With Ewen, With Bending/Squatting, Upon Standing, and Full Bladder              Continuous / insensible urine loss: Yes              Nocturnal enuresis: Yes              Leakage volume: Moderate              Number of pads/day: 7-10               Bladder emptying: Complete              Voiding symptoms: Post-Void Dribble and Double or Triple Voiding              UTI in last 12 months: no              Other urologic history: no    She presents for complex urodynamic testing today to fully elucidate her bladder function and symptom pathophysiology, in order to guide further treatment.    Verbal consent was obtained after review of risk and benefit.     Chaperone:  Thao Riggs MA    Procedure: The patient was taken to the urodynamic suite and placed in the urodynamic chair. She underwent sterile prep with betadine prior to catheterization. There was a negative urinalysis. Air-charged catheters were placed in the urethra/bladder and vagina. Urodynamics were performed using routine techniques. Prolapse was reduced with a cotton scopette for stress testing. There were no complications.     Antibiotic given: None    Urodynamic findings:     Preliminary  Uroflometry     Flow pattern: Continuous  Maximum flow: 16 mL/sec  Average flow: 9.5 mL/sec  Voided volume: 160 mL  Post-void residual: 6 mL  Flow time: 17 sec    Filling cystometrogram    First sensation: 12 mL  First desire: 108 mL  Strong Desire: 150 mL  Urodynamic capacity: 299 mL   Stress leakage: Yes  Uninhibited detrusor contractions present: Yes  Leakage with DO: No  Leak point pressures  At 150mL volume: 39 cm H2O, large leak with cough and valsalva  At 300mL volume: 45 cm H2O, medium leak with cough and valsalva  Compliance: Normal    Urethral pressure profile    Maximum urethral closing pressure (MUCP): 42 cm H2O  Morphology: Weaker    Pressure voiding study    The patient's voiding mechanism was accomplished by detrusor contraction   Max flow: 23 mL/sec  Average flow: 10 mL/sec  Post-void residual: 11 mL  Pdet at peak flow: 12 cm H2O  Flow time: 28 sec  Pelvic floor EMG silenced during voiding: Yes    Pelvic floor EMG: Normal       UDS procedure performed by BLADIMIR Cortes RNFA      Assessment:     She has completed urodynamic testing, which was uncomplicated.     Filling phase: Just reached normal capacity with some difficulty,  some uninhibited detrusor contractions without leak, borderline MUCP. Stress leak at both half and full capacity  Voiding phase: Complete emptying via detrusor contraction    Plan:      (Georgian) [id number]    She was counseled on urodynamic findings  Her UDS did demonstrate ERIK and some lower MUCP, with a difficulty filling to full capacity. She was counseled that if she did not have her prior surgical history, I would recommend a primary ERIK procedure such as sling or bulking. On exam (and cysto) she has a tight tension band at the site of 4 prior slings, and this would limit the utility of any further intervention. I recommend sling and scar removal with likely steven plication, allow healing, and if ERIK continued consider new retropubic mesh or fascial sling. She  understands that her complex history requires a stepwise approach to treatment.   Schedule for: Excision of midurethral sling mesh, steven plication, trigger point injections, and all indicated procedures.   She was given detailed pre-op counseling to review, including risks of surgery (bleeding, pain, damage to surrounding structures, continued incontinence, need for further intervention or surgeries)  Counseled on normal post-UDS symptoms including burning and possible hematuria. If this persists after 2 days she should call or send [employer] message.         Torie Maurice MD, FACOG    Female Pelvic Medicine and Reconstructive Surgery  Department of Obstetrics and Gynecology  [facility]

## 2024-02-06 NOTE — PROCEDURES
Procedure note: Cystourethroscopy    Procedure performed: Cystourethroscopy (98110)      Indication: Tanya Barrow is a 48 y.o.  with h/o prior mesh sling x4 and LUTS. She presents for office diagnostic cystourethroscopy testing today to r/o mesh exposures into bladder/urethra, and to fully elucidate her bladder structure or underlying cause for symptoms. Written consent was obtained after review of risk and benefit.        PROCEDURE CYSTOSCOPY    Date/Time: 2/5/2024 8:07 PM    Performed by: Torie Maurice M.D.  Authorized by: Torie Maurice M.D.    Chaperone present: yes    Timeout: timeout called immediately prior to procedure    Prep: patient was prepped and draped in usual sterile fashion    Prep type: Betadine    Anesthesia: local anesthesia      Procedure Details     Cystoscope type: flexible    Cystoscopy route: transurethral      Irrigation used: saline      Position: dorsal lithotomy    Urethra     Urethra: normal      Vagina     Vagina: normal      Bladder     Bladder comment: Cystitis cystica, no mesh exposures into bladder/ urethra, no sutures, diverticula, stones, lesions.     Post-Procedure Details     Catheter placed: no      Appearance of urine after procedure: clear    Outcome: patient tolerated procedure well with no complications      Post-procedure interventions: post-procedure instructions given      Disposition: discharged home in satisfactory condition        Torie Maurice MD

## 2024-04-23 ENCOUNTER — APPOINTMENT (OUTPATIENT)
Dept: ADMISSIONS | Facility: MEDICAL CENTER | Age: 49
End: 2024-04-23
Attending: STUDENT IN AN ORGANIZED HEALTH CARE EDUCATION/TRAINING PROGRAM
Payer: MEDICAID

## 2024-05-14 NOTE — PROGRESS NOTES
Urogynecology & Reconstructive Pelvic Surgery - Follow Up    Tanya Barrow MRN:[id number] :1975    Referred by: Cassie Bates DO    Reason for Visit:   Chief Complaint   Patient presents with    Other     Pre Op        Sheree pardo    Subjective     History of Presenting Illness:    Tanya Barrow is a 48 y.o. year old P5 with refractory incontinence who presents for follow up    She understands her upcoming procedure and brings questions.  Since her last discussion I have been thinking about her case and think she would benefit from a urethral bulking at the time of her sling removal in order to give her some semblance of continence while awaiting possible retreatment for her stress incontinence.  This option was presented to her and she is enthusiastic about having this performed at the same time.  She reports good blood sugar control recently with most levels less than 180 mg/dL      Initial HPI: She presents for the evaluation and management of prolapse, severe incontinence, pelvic pain after multiple prior surgeries. She is currently most bothered by urinary incontinence with nearly all activities including exercise, urgency, and without sensation.  She has undergone 4 prior mid urethral sling procedures with Dr. Hernadez, none of which improved her symptoms and the last of which worsened her symptoms. She also has a subjective vaginal bulge, which feels worse than prior surgeries.  She has had 4 native tissue prolapse repairs.  After one of the surgeries, she had difficulty emptying her rectum and had to have mesh removed from the anal area during a revision, per her report.  She also has significant dyspareunia      Prior Pelvic surgery:   10/25/2016: BISMARK LUO, anterior/posterior repair, enterocele repair, sacrospinous suspension, transobturator sling  2017: Anterior/posterior pair, sacrospinous suspension, transobturator sling  10/17/2017:  "Anterior/posterior repair, vault suspension, uphold mesh, as obturator sling (Hernadez)  2018: Anterior/posterior repair, \"tension-free tape\" (sounds like suprapubic sling from op note) (Hernadez)  2020: Robotic cholecystectomy     Prior treatment:   Pessary - reaction to material      Pelvic floor symptom review:     Bladder:   Voids per day: 15-20 Voids per night: 6-8      Urinary incontinence episodes per day: throughout the day    Urge leakage:  On Movement to Bathroom and Full Bladder   Stress leakage: With Cough, With Laugh, With Exercise, With Millard, With Bending/Squatting, Upon Standing, and Full Bladder   Continuous / insensible urine loss: Yes   Nocturnal enuresis: Yes   Leakage volume: Moderate   Number of pads/day: 7-10    Bladder emptying: Complete   Voiding symptoms: Post-Void Dribble and Double or Triple Voiding   UTI in last 12 months: no   Other urologic history: no      Prolapse:     Prolapse symptoms: Bulge and Pelvic Pressure   Degree of prolapse: To Introitus     Bowel:    Constipation: Yes   Bowel movements per day: 1    Straining to empty bowels: Yes   Splinting to evacuate: Yes   Painful evacuation: Yes   Difficulty emptying rectum: Yes   Incontinence to stool: no   Blood in stool: No    Hemorrhoids: No         Sexual function:    Sexually active: No    Gender of partners: Male   Pain with intercourse: yes,deep       Pelvic Pain: Yes, with walking/lifting      Past medical and surgical history    Past obstetric history   Number of vaginal deliveries: 4   Number of  deliveries: 0     Past gynecological history:    Last menstrual period: Patient's last menstrual period was 2010.   S/p hysterectomy      Past medical history:  Past Medical History:   Diagnosis Date    Chest pain 2019    MPI with no ischemia or infarct, LVEF 72%.    Hypertension 2006    Medication    Anemia     Blood clotting disorder (HCC)     Diabetes     Oral meds    GERD (gastroesophageal reflux " disease)     Heart burn     Hemorrhagic disorder (HCC)     Hyperlipidemia     Obesity     Palpitations     Urinary bladder disorder     Urinary incontinence      Past surgical history:  Past Surgical History:   Procedure Laterality Date    CHOLECYSTECTOMY ROBOTIC XI  12/9/2020    Procedure: CHOLECYSTECTOMY, ROBOT-ASSISTED, USING DA ROSA XI;  Surgeon: Nicho Copeland M.D.;  Location: SURGERY [facility];  Service: Gen Robotic    ANTERIOR AND POSTERIOR REPAIR  2/22/2018    Procedure: ANTERIOR AND POSTERIOR REPAIR;  Surgeon: Angel Hernadez M.D.;  Location: SURGERY SAME [facility];  Service: Gynecology    ENTEROCELE REPAIR  2/22/2018    Procedure: ENTEROCELE REPAIR;  Surgeon: Angel Hernadez M.D.;  Location: SURGERY SAME DAY [facility];  Service: Gynecology    VAGINAL SUSPENSION N/A 2/22/2018    Procedure: VAGINAL SUSPENSION- SACROSPINOUS VAULT;  Surgeon: Angel Hernadez M.D.;  Location: SURGERY SAME DAY [facility];  Service: Gynecology    BLADDER SLING FEMALE N/A 2/22/2018    Procedure: BLADDER SLING FEMALE- TENSION FREE VAGINAL TAPE;  Surgeon: Angel Hernadez M.D.;  Location: SURGERY [facility];  Service: Gynecology    BLADDER SLING FEMALE  10/17/2017    Procedure: BLADDER SLING FEMALE - TENSION FREE TAPE PROCEDURE;  Surgeon: Angel Hernadez M.D.;  Location: SURGERY [facility];  Service: Gynecology    CYSTOSCOPY N/A 10/17/2017    Procedure: CYSTOSCOPY;  Surgeon: Angel Hernadez M.D.;  Location: SURGERY [facility];  Service: Gynecology    ANTERIOR AND POSTERIOR REPAIR  10/17/2017    Procedure: ANTERIOR AND POSTERIOR REPAIR W/UPHOLD MESH;  Surgeon: Angel Hernadez M.D.;  Location: SURGERY SAME [facility];  Service: Gynecology    ENTEROCELE REPAIR  10/17/2017    Procedure: ENTEROCELE REPAIR - PERINEOPLASTY;  Surgeon: Angel Hernadez M.D.;  Location: SURGERY SAME [facility];  Service: Gynecology    VAGINAL SUSPENSION  10/17/2017    Procedure: VAGINAL  SUSPENSION - SACROSPINOUS VAULT;  Surgeon: Angel Hernadez M.D.;  Location: SURGERY SAME DAY [facility];  Service: Gynecology    BLADDER SLING FEMALE  4/11/2017    Procedure: BLADDER SLING FEMALE-TOT;  Surgeon: Angel Hernadez M.D.;  Location: SURGERY SAME DAY [facility];  Service:     ANTERIOR AND POSTERIOR REPAIR  4/11/2017    Procedure: ANTERIOR AND POSTERIOR REPAIR;  Surgeon: Angel Hernadez M.D.;  Location: SURGERY SAME DAY [facility];  Service:     ENTEROCELE REPAIR  4/11/2017    Procedure: ENTEROCELE REPAIR- PERINEOPLASTY;  Surgeon: Angel Hernadez M.D.;  Location: SURGERY SAME DAY [facility];  Service:     VAGINAL SUSPENSION  4/11/2017    Procedure: VAGINAL SUSPENSION-SACROSPINOUS VAULT;  Surgeon: Angel Hernadez M.D.;  Location: SURGERY SAME DAY [facility];  Service:     BLADDER SLING FEMALE  10/25/2016    Procedure: BLADDER SLING FEMALE TOT;  Surgeon: Angel Hernadez M.D.;  Location: SURGERY SAME DAY [facility];  Service:     VAGINAL HYSTERECTOMY SCOPE TOTAL  10/25/2016    Procedure: VAGINAL HYSTERECTOMY SCOPE TOTAL;  Surgeon: Angel Hernadez M.D.;  Location: SURGERY SAME DAY [facility];  Service:     SALPINGECTOMY Bilateral 10/25/2016    Procedure: SALPINGECTOMY;  Surgeon: Angel Hernadez M.D.;  Location: SURGERY SAME DAY [facility];  Service:     ANTERIOR AND POSTERIOR REPAIR  10/25/2016    Procedure: ANTERIOR AND POSTERIOR REPAIR;  Surgeon: Angel Hernadez M.D.;  Location: SURGERY SAME DAY [facility];  Service:     ENTEROCELE REPAIR  10/25/2016    Procedure: ENTEROCELE REPAIR, PERINEOPLASTY;  Surgeon: Angel Hernadez M.D.;  Location: SURGERY SAME DAY [facility];  Service:     VAGINAL SUSPENSION  10/25/2016    Procedure: VAGINAL SUSPENSION SACROSPINOUS VAULT ;  Surgeon: Angel Hernadez M.D.;  Location: SURGERY SAME DAY [facility];  Service:     OTHER      Tubal ligation     Medications:has a current medication list which includes the following prescription(s): trulicity,  multiple vitamins-minerals, jardiance, omeprazole, chlorthalidone, metoprolol sr, losartan, atorvastatin, metformin, insulin glargine, and aspirin ec.  Allergies:Latex  Family history:  Family History   Problem Relation Age of Onset    Diabetes Mother         pills    Hypertension Mother     Diabetes Father         insulin    Diabetes Paternal Grandmother     Diabetes Paternal Aunt      Social history: reports that she has never smoked. She has never used smokeless tobacco. She reports that she does not drink alcohol and does not use drugs.    Review of systems: A full review of systems was performed, and negative with the exception of want is noted above in the HPI.        Objective        BP (!) 157/88 (BP Location: Right arm, Patient Position: Sitting, BP Cuff Size: Large adult)   Pulse 72   Wt 204 lb   LMP 01/12/2010   BMI 33.95 kg/m²     Physical Exam  Vitals reviewed. Exam conducted with a chaperone present (MA - see notes.).   Constitutional:       Appearance: Normal appearance.   HENT:      Head: Normocephalic.      Mouth/Throat:      Mouth: Mucous membranes are moist.   Cardiovascular:      Rate and Rhythm: Normal rate.   Pulmonary:      Effort: Pulmonary effort is normal.   Abdominal:      Palpations: Abdomen is soft. There is no mass.      Tenderness: There is no abdominal tenderness.   Skin:     General: Skin is warm and dry.   Neurological:      Mental Status: She is alert.   Psychiatric:         Mood and Affect: Mood normal.         Genitourinary:    Vulva: WNL   Bulbocavernosus reflex: Intact   Anal wink reflex: Intact   Perineal sensation: WNL   Urethra: Atrophic - no mesh visible. Pain reproduced on palpation of sling and obturator internus fascia   Vagina: Atrophic   Atrophy: Moderate   Cough stress test: Positive    Pelvic floor:    POP-Q: Aa -0.5 / Ba -0.5 / TVL 7 / C -4 / D x / Ap -1 / Bp -1 / GH 4 / PB 2   Prolapse stage: 2   Paravaginal defect: mild   Cervical elongation: No    Urethral  tenderness: No    Bladder/ suprapubic tenderness: Yes   Levator tenderness: Bilateral   Levator muscle tone: Hypertonic   Pelvic floor contraction strength (modified Oxford scale): 2=Weak   Pelvic floor contraction duration: Brief    Bimanual exam: no masses    Procedure Performed: No    Diagnostic test and records review:    Labs: n/a    Radiology: n/a    Procedural:     Urodynamics 2/2024  Filling phase: Just reached normal capacity with some difficulty,  some uninhibited detrusor contractions without leak, borderline MUCP. Stress leak at both half and full capacity  Voiding phase: Complete emptying via detrusor contraction       Documentation reviewed: Prior EMR Records, op reports (see HPI)           Assessment & Plan     Tanya Barrow is a 48 y.o. year old P5 with severe mixed urinary incontinence/OAB after 4 prior sling procedures, chronic pelvic pain/dyspareunia, mild cystocele. We discussed my recommendations for further diagnosis and treatment at length today.     She was previously given overall counseling that trying to undo the symptoms that she is currently feeling from her multiple prior surgeries often takes multiple steps.  Given the mild prolapse on exam and severe urinary symptoms, I think it is best to address her urinary function first, as well as her pain.    1. Urinary incontinence, mixed, OAB, nocturia  2. Incontinence without sensory awareness  3. History of suburethral sling procedure x4 (toMUSx3, rMUSx1)  - s/p Urodynamics with stress incontinence demonstrated with lower bladder capacity. . She was counseled that if she did not have her prior surgical history, I would recommend a primary ERIK procedure such as sling or bulking. On exam (and cysto) she has a tight tension band at the site of 4 prior slings, and this would limit the utility of any further intervention. I recommend sling and scar removal with likely steven plication, allow healing, and if ERIK continued consider  new retropubic mesh or fascial sling. She understands that her complex history requires a stepwise approach to treatment.   - Cystoscopy  ruled out mesh exposure into the bladder or any organic causes for her urinary leakage.   -  I strongly suspect, that given her urinary symptoms as well has her pain, that mesh excision and detensioning of the levator complex are can be the most important first step, however I am not comfortable offering this until I understand her urodynamic parameters.  I counseled today that after sling excision, she may have a persistent or worsening urinary incontinence, but I would prefer to allow healing and then potentially readdressing this, if necessary.  Multistep treatment course is very likely  - Discussion today I offered her urethral bulking at the time of the procedure in order to give her some continence mechanism during the recovery.  She understands that this may help her incontinence but she may ultimately require other treatments after healing from her mesh explantation.  - she opts to proceed with Excision of midurethral sling mesh, steven plication, urethral bulking, trigger point injections, and all indicated procedures.     Benefits of surgery were reviewed, including functional outcomes (bladder/bowel/sexual). Risks of surgery were  also discussed including anesthesia, bleeding, infection, damage to surrounding organs (bladder, ureter, urethra, bowel, blood vessel, nerves), possible blood transfusion, recurrent prolapse,  transient voiding dysfunction requiring catheterization, new/worsening urinary incontinence, pain with sex.     Specifically she was counselled as to what to expect on the day of surgery in the holding area, counseled that medical students may be involved in her care. She will likely go home on the same day as the surgery. She was counseled on what to expect in the recovery room including voiding trial and possible vaginal packing removal, as well as what  to expect if voiding trial is unsuccessful, which would be transient catheterization.   Discussed trajectory of recovery, including maximizing NSAIDs and Tylenol, and that narcotics are not routinely given.  Discussed restrictions including heavy lifting that requires straining, driving while on narcotics, nothing in the vagina and no bathing/swimming for at least 6 weeks until evaluated in the office.  Return to work discussed.  *Please see the corresponding after visit summary counseling packet for detailed counseling provided for the patient.     Pre-op meds: acetaminophen 1000mg PO, phenazopyridine 200mg PO, Cefazolin 2gm IV   Post-op medication plan: ibuprofen, tylenol, miralax   Home medication review: She should additionally hold all NSAIDs and supplements for 1 week prior to surgery.  Stop aspirin 1 week prior to surgery.        4. Cystocele, midline  Prior counseling: Despite significant Valsalva effort, I was only able to bring out a stage II cystocele with the Aa point at -0.5.  She also has significant atrophy and scarring from her prior surgeries.  I do not think correction of prolapse will significantly affect her urinary function, and the only option for prolapse repair at this point would be a sacrocolpopexy given the multiple prior failed native tissue repairs.  I counseled that I would recommend only observing her prolapse at this time, and continuing to use vaginal estrogen to improve tissue quality.  If prolapse is worse on future examinations or worse after addressing her urinary incontinence function, sacrocolpopexy may be our best next step.    5. Dyspareunia in female  Significant high tone pelvic floor and exacerbation of pain on palpation of the obturator internus fascia and sling insertion sites.  This leads me to believe that tensioning from her prior sling procedures is contributing to global pelvic floor dysfunction and likely her dyspareunia and chronic pelvic pain.  As noted above, I  recommend likely excision of her sling after evaluation of her bladder function  Previously referred to physical therapy, she will likely need this after surgery as well.    6. Vaginal atrophy  Continue vaginal estrogen, increase to daily for 2 weeks then back to twice per week to have quicker improvement in tissue quality    7. Chronic constipation  This is a longstanding issue.  Due to her constipation I do not recommend trial of anticholinergics before her bladder testing, as they may worsen her bowel function             Torie Maurice MD, FACOG  Urogynecology and Reconstructive Pelvic Surgery  Department of Obstetrics and Gynecology  [facility]        This medical record contains text that has been entered with the assistance of computer voice recognition and dictation software.  Therefore, it may contain unintended errors in text, spelling, punctuation, or grammar

## 2024-05-16 ENCOUNTER — TELEPHONE (OUTPATIENT)
Dept: OBGYN | Facility: CLINIC | Age: 49
End: 2024-05-16

## 2024-05-16 ENCOUNTER — APPOINTMENT (OUTPATIENT)
Dept: GYNECOLOGY | Facility: CLINIC | Age: 49
End: 2024-05-16
Payer: MEDICAID

## 2024-05-16 VITALS
DIASTOLIC BLOOD PRESSURE: 88 MMHG | WEIGHT: 204 LBS | SYSTOLIC BLOOD PRESSURE: 157 MMHG | HEART RATE: 72 BPM | BODY MASS INDEX: 33.95 KG/M2

## 2024-05-16 DIAGNOSIS — N94.10 DYSPAREUNIA IN FEMALE: ICD-10-CM

## 2024-05-16 DIAGNOSIS — Z98.890 HISTORY OF SUBURETHRAL SLING PROCEDURE: ICD-10-CM

## 2024-05-16 DIAGNOSIS — N39.42 INCONTINENCE WITHOUT SENSORY AWARENESS: ICD-10-CM

## 2024-05-16 DIAGNOSIS — N39.46 URINARY INCONTINENCE, MIXED: ICD-10-CM

## 2024-05-16 PROCEDURE — 99213 OFFICE O/P EST LOW 20 MIN: CPT | Performed by: STUDENT IN AN ORGANIZED HEALTH CARE EDUCATION/TRAINING PROGRAM

## 2024-05-16 PROCEDURE — 3077F SYST BP >= 140 MM HG: CPT | Performed by: STUDENT IN AN ORGANIZED HEALTH CARE EDUCATION/TRAINING PROGRAM

## 2024-05-16 PROCEDURE — 3079F DIAST BP 80-89 MM HG: CPT | Performed by: STUDENT IN AN ORGANIZED HEALTH CARE EDUCATION/TRAINING PROGRAM

## 2024-05-16 RX ORDER — DULAGLUTIDE 0.75 MG/.5ML
0.5 INJECTION, SOLUTION SUBCUTANEOUS ONCE
COMMUNITY
Start: 2024-03-11

## 2024-05-16 RX ORDER — ESTRADIOL 0.1 MG/G
CREAM VAGINAL
Qty: 1 EACH | Refills: 6 | Status: SHIPPED | OUTPATIENT
Start: 2024-05-16

## 2024-05-16 ASSESSMENT — FIBROSIS 4 INDEX: FIB4 SCORE: 0.99

## 2024-05-16 NOTE — TELEPHONE ENCOUNTER
----- Message from Torie Maurice M.D. sent at 5/16/2024  3:27 PM PDT -----  Regarding: call pt  Would you mind calling this patient very quickly and letting her know that she should hold her aspirin 1 week prior to the procedure?  Forgot to mention this at her visit.  Romanian-speaking.  Thank you

## 2024-05-16 NOTE — PROGRESS NOTES
PT here for Pre Op  Surgery with  on 6/7/2024 @ 12:45 pm  PT aware to arrive 2 hours prior  Good # [phone]   Pharmacy Verified

## 2024-05-17 ENCOUNTER — PRE-ADMISSION TESTING (OUTPATIENT)
Dept: ADMISSIONS | Facility: MEDICAL CENTER | Age: 49
End: 2024-05-17
Attending: STUDENT IN AN ORGANIZED HEALTH CARE EDUCATION/TRAINING PROGRAM
Payer: MEDICAID

## 2024-05-17 RX ORDER — HYDROCHLOROTHIAZIDE 12.5 MG/1
12.5 TABLET ORAL EVERY MORNING
COMMUNITY
Start: 2024-02-19

## 2024-05-30 ENCOUNTER — TELEPHONE (OUTPATIENT)
Dept: OBGYN | Facility: CLINIC | Age: 49
End: 2024-05-30
Payer: MEDICAID

## 2024-05-30 NOTE — TELEPHONE ENCOUNTER
"Patient informed of US results.   Per Dr. Maurice, \"Please let her know that this is likely benign.  Recommend repeat ultrasound in 3 to 6 months\"    Patient verbalized  understanding.   "

## 2024-06-02 ENCOUNTER — APPOINTMENT (OUTPATIENT)
Dept: RADIOLOGY | Facility: MEDICAL CENTER | Age: 49
End: 2024-06-02
Attending: EMERGENCY MEDICINE
Payer: MEDICAID

## 2024-06-02 ENCOUNTER — HOSPITAL ENCOUNTER (EMERGENCY)
Facility: MEDICAL CENTER | Age: 49
End: 2024-06-02
Attending: EMERGENCY MEDICINE
Payer: MEDICAID

## 2024-06-02 VITALS
SYSTOLIC BLOOD PRESSURE: 161 MMHG | TEMPERATURE: 98.5 F | RESPIRATION RATE: 20 BRPM | BODY MASS INDEX: 32.84 KG/M2 | WEIGHT: 197.09 LBS | HEIGHT: 65 IN | OXYGEN SATURATION: 96 % | HEART RATE: 85 BPM | DIASTOLIC BLOOD PRESSURE: 80 MMHG

## 2024-06-02 DIAGNOSIS — D72.829 LEUKOCYTOSIS, UNSPECIFIED TYPE: ICD-10-CM

## 2024-06-02 DIAGNOSIS — R07.89 CHEST PRESSURE: ICD-10-CM

## 2024-06-02 DIAGNOSIS — J02.0 STREP PHARYNGITIS: ICD-10-CM

## 2024-06-02 LAB
ALBUMIN SERPL BCP-MCNC: 4.2 G/DL (ref 3.2–4.9)
ALBUMIN/GLOB SERPL: 1.2 G/DL
ALP SERPL-CCNC: 103 U/L (ref 30–99)
ALT SERPL-CCNC: 15 U/L (ref 2–50)
ANION GAP SERPL CALC-SCNC: 13 MMOL/L (ref 7–16)
AST SERPL-CCNC: 13 U/L (ref 12–45)
BASOPHILS # BLD AUTO: 0.2 % (ref 0–1.8)
BASOPHILS # BLD: 0.03 K/UL (ref 0–0.12)
BILIRUB SERPL-MCNC: 0.4 MG/DL (ref 0.1–1.5)
BUN SERPL-MCNC: 11 MG/DL (ref 8–22)
CALCIUM ALBUM COR SERPL-MCNC: 8.7 MG/DL (ref 8.5–10.5)
CALCIUM SERPL-MCNC: 8.9 MG/DL (ref 8.5–10.5)
CHLORIDE SERPL-SCNC: 100 MMOL/L (ref 96–112)
CO2 SERPL-SCNC: 25 MMOL/L (ref 20–33)
CREAT SERPL-MCNC: 0.64 MG/DL (ref 0.5–1.4)
EKG IMPRESSION: NORMAL
EOSINOPHIL # BLD AUTO: 0.04 K/UL (ref 0–0.51)
EOSINOPHIL NFR BLD: 0.2 % (ref 0–6.9)
ERYTHROCYTE [DISTWIDTH] IN BLOOD BY AUTOMATED COUNT: 40 FL (ref 35.9–50)
GFR SERPLBLD CREATININE-BSD FMLA CKD-EPI: 107 ML/MIN/1.73 M 2
GLOBULIN SER CALC-MCNC: 3.4 G/DL (ref 1.9–3.5)
GLUCOSE SERPL-MCNC: 187 MG/DL (ref 65–99)
HCT VFR BLD AUTO: 43.4 % (ref 37–47)
HGB BLD-MCNC: 13.9 G/DL (ref 12–16)
IMM GRANULOCYTES # BLD AUTO: 0.09 K/UL (ref 0–0.11)
IMM GRANULOCYTES NFR BLD AUTO: 0.5 % (ref 0–0.9)
LIPASE SERPL-CCNC: 33 U/L (ref 11–82)
LYMPHOCYTES # BLD AUTO: 1.79 K/UL (ref 1–4.8)
LYMPHOCYTES NFR BLD: 9.4 % (ref 22–41)
MCH RBC QN AUTO: 27.2 PG (ref 27–33)
MCHC RBC AUTO-ENTMCNC: 32 G/DL (ref 32.2–35.5)
MCV RBC AUTO: 84.9 FL (ref 81.4–97.8)
MONOCYTES # BLD AUTO: 0.46 K/UL (ref 0–0.85)
MONOCYTES NFR BLD AUTO: 2.4 % (ref 0–13.4)
NEUTROPHILS # BLD AUTO: 16.61 K/UL (ref 1.82–7.42)
NEUTROPHILS NFR BLD: 87.3 % (ref 44–72)
NRBC # BLD AUTO: 0 K/UL
NRBC BLD-RTO: 0 /100 WBC (ref 0–0.2)
PLATELET # BLD AUTO: 227 K/UL (ref 164–446)
PMV BLD AUTO: 10.5 FL (ref 9–12.9)
POTASSIUM SERPL-SCNC: 3.8 MMOL/L (ref 3.6–5.5)
PROT SERPL-MCNC: 7.6 G/DL (ref 6–8.2)
RBC # BLD AUTO: 5.11 M/UL (ref 4.2–5.4)
S PYO DNA SPEC NAA+PROBE: DETECTED
SODIUM SERPL-SCNC: 138 MMOL/L (ref 135–145)
TROPONIN T SERPL-MCNC: <6 NG/L (ref 6–19)
WBC # BLD AUTO: 19 K/UL (ref 4.8–10.8)

## 2024-06-02 PROCEDURE — 93005 ELECTROCARDIOGRAM TRACING: CPT

## 2024-06-02 PROCEDURE — 85025 COMPLETE CBC W/AUTO DIFF WBC: CPT

## 2024-06-02 PROCEDURE — 0241U HCHG SARS-COV-2 COVID-19 NFCT DS RESP RNA 4 TRGT ED POC: CPT

## 2024-06-02 PROCEDURE — 80053 COMPREHEN METABOLIC PANEL: CPT

## 2024-06-02 PROCEDURE — 94760 N-INVAS EAR/PLS OXIMETRY 1: CPT

## 2024-06-02 PROCEDURE — 96365 THER/PROPH/DIAG IV INF INIT: CPT

## 2024-06-02 PROCEDURE — 96375 TX/PRO/DX INJ NEW DRUG ADDON: CPT

## 2024-06-02 PROCEDURE — 36415 COLL VENOUS BLD VENIPUNCTURE: CPT

## 2024-06-02 PROCEDURE — 93005 ELECTROCARDIOGRAM TRACING: CPT | Performed by: EMERGENCY MEDICINE

## 2024-06-02 PROCEDURE — 700111 HCHG RX REV CODE 636 W/ 250 OVERRIDE (IP): Mod: JZ,UD | Performed by: EMERGENCY MEDICINE

## 2024-06-02 PROCEDURE — 84484 ASSAY OF TROPONIN QUANT: CPT

## 2024-06-02 PROCEDURE — 87651 STREP A DNA AMP PROBE: CPT

## 2024-06-02 PROCEDURE — 83690 ASSAY OF LIPASE: CPT

## 2024-06-02 PROCEDURE — 99284 EMERGENCY DEPT VISIT MOD MDM: CPT

## 2024-06-02 PROCEDURE — 71045 X-RAY EXAM CHEST 1 VIEW: CPT

## 2024-06-02 PROCEDURE — 700105 HCHG RX REV CODE 258: Mod: UD | Performed by: EMERGENCY MEDICINE

## 2024-06-02 RX ORDER — AMOXICILLIN AND CLAVULANATE POTASSIUM 875; 125 MG/1; MG/1
1 TABLET, FILM COATED ORAL 2 TIMES DAILY
Qty: 20 TABLET | Refills: 0 | Status: ACTIVE | OUTPATIENT
Start: 2024-06-02 | End: 2024-06-12

## 2024-06-02 RX ORDER — DEXAMETHASONE SODIUM PHOSPHATE 4 MG/ML
10 INJECTION, SOLUTION INTRA-ARTICULAR; INTRALESIONAL; INTRAMUSCULAR; INTRAVENOUS; SOFT TISSUE ONCE
Status: COMPLETED | OUTPATIENT
Start: 2024-06-02 | End: 2024-06-02

## 2024-06-02 RX ORDER — KETOROLAC TROMETHAMINE 15 MG/ML
15 INJECTION, SOLUTION INTRAMUSCULAR; INTRAVENOUS ONCE
Status: COMPLETED | OUTPATIENT
Start: 2024-06-02 | End: 2024-06-02

## 2024-06-02 RX ADMIN — KETOROLAC TROMETHAMINE 15 MG: 15 INJECTION, SOLUTION INTRAMUSCULAR; INTRAVENOUS at 12:11

## 2024-06-02 RX ADMIN — AMPICILLIN AND SULBACTAM 3 G: 1; 2 INJECTION, POWDER, FOR SOLUTION INTRAMUSCULAR; INTRAVENOUS at 14:47

## 2024-06-02 RX ADMIN — DEXAMETHASONE SODIUM PHOSPHATE 10 MG: 4 INJECTION INTRA-ARTICULAR; INTRALESIONAL; INTRAMUSCULAR; INTRAVENOUS; SOFT TISSUE at 14:45

## 2024-06-02 ASSESSMENT — HEART SCORE
TROPONIN: LESS THAN OR EQUAL TO NORMAL LIMIT
ECG: NORMAL
HISTORY: SLIGHTLY SUSPICIOUS
AGE: 45-64
HEART SCORE: 3
RISK FACTORS: >2 RISK FACTORS OR HX OF ATHEROSCLEROTIC DISEASE

## 2024-06-02 ASSESSMENT — FIBROSIS 4 INDEX: FIB4 SCORE: 0.99

## 2024-06-02 ASSESSMENT — PAIN DESCRIPTION - PAIN TYPE: TYPE: ACUTE PAIN

## 2024-06-02 NOTE — ED TRIAGE NOTES
"Chief Complaint   Patient presents with    Flu Like Symptoms     Pt reports chills, chest pain and generalized pain for the last couple days. Pt reports a sore throat with ear pain on R side.          Pt ambulated  to triage for above complaint.  Pt is AO x 4, follows commands, and responds appropriately to questions. Patient's breathing is unlabored and pain is currently 4/10 on the 0-10 pain scale.  Pt placed in lobby. Patient educated on triage process and encouraged to alert staff for any changes.      BP (!) 181/97   Pulse 95   Temp 37.6 °C (99.7 °F) (Temporal)   Resp 18   Ht 1.651 m (5' 5\")   Wt 89.4 kg (197 lb 1.5 oz)   SpO2 97%     "

## 2024-06-02 NOTE — ED PROVIDER NOTES
ED Provider Note    CHIEF COMPLAINT  Chief Complaint   Patient presents with    Flu Like Symptoms     Pt reports chills, chest pain and generalized pain for the last couple days. Pt reports a sore throat with ear pain on R side.        EXTERNAL RECORDS REVIEWED  PDMP no active narcotic or sedative prescriptions, no prescriptions over the last 2 years    HPI/ROS  LIMITATION TO HISTORY   Select: : None  OUTSIDE HISTORIAN(S):  Significant other at bedside for discussion history and physical, discussion of plans    Tanya Barrow is a 48 y.o. female who presents with 9 days of sore throat.  She developed chest pressure yesterday.  She developed pain rating to her right ear yesterday.  She has had bodyaches throughout.  Intermittent nausea however currently states he feels well.  She tolerated oral intake today although with some difficulty secondary to pain with swallowing.  No shortness of breath, no cough.  No rhinorrhea.  No hearing difficulty in the affected right ear.  She denies headache or stiff neck.  No rash.  The increase of pain to her right ear and chest pressure, and difficulty swallowing are what prompted her to come to the hospital 9 days after onset of sore throat.    PAST MEDICAL HISTORY   has a past medical history of Anemia, Bowel habit changes (05/17/2024), Chest pain (02/2019), Diabetes, GERD (gastroesophageal reflux disease), Heart burn, High cholesterol (05/17/2024), Hyperlipidemia, Hypertension (2006), Obesity, Palpitations, Sleep apnea (05/17/2024), Urinary bladder disorder (05/17/2024), and Urinary incontinence.    SURGICAL HISTORY   has a past surgical history that includes other; bladder sling female (10/25/2016); bladder sling female (4/11/2017); bladder sling female (10/17/2017); cystoscopy (N/A, 10/17/2017); vaginal hysterectomy scope total (10/25/2016); salpingectomy (Bilateral, 10/25/2016); anterior and posterior repair (10/25/2016); enterocele repair (10/25/2016); vaginal  suspension (10/25/2016); anterior and posterior repair (4/11/2017); enterocele repair (4/11/2017); vaginal suspension (4/11/2017); anterior and posterior repair (10/17/2017); enterocele repair (10/17/2017); vaginal suspension (10/17/2017); anterior and posterior repair (2/22/2018); enterocele repair (2/22/2018); vaginal suspension (N/A, 2/22/2018); bladder sling female (N/A, 2/22/2018); and cholecystectomy robotic xi (12/9/2020).    FAMILY HISTORY  Family History   Problem Relation Age of Onset    Diabetes Mother         pills    Hypertension Mother     Diabetes Father         insulin    Diabetes Paternal Grandmother     Diabetes Paternal Aunt        SOCIAL HISTORY  Social History     Tobacco Use    Smoking status: Never     Passive exposure: Never    Smokeless tobacco: Never   Vaping Use    Vaping status: Never Used   Substance and Sexual Activity    Alcohol use: Not Currently     Comment: only socially    Drug use: Never    Sexual activity: Not Currently     Partners: Male       CURRENT MEDICATIONS  Home Medications       Reviewed by Ariadna Calabrese R.N. (Registered Nurse) on 06/02/24 at 1040  Med List Status: Partial     Medication Last Dose Status   aspirin EC (ECOTRIN) 81 MG Tablet Delayed Response  Active   atorvastatin (LIPITOR) 40 MG Tab  Active   Empagliflozin (JARDIANCE) 25 MG Tab  Active   estradiol (ESTRACE VAGINAL) 0.1 MG/GM vaginal cream  Active   hydroCHLOROthiazide 12.5 MG tablet  Active   Insulin Glargine (BASAGLAR KWIKPEN) 100 UNIT/ML Solution Pen-injector  Active   losartan (COZAAR) 100 MG Tab  Active   metoprolol SR (TOPROL XL) 50 MG TABLET SR 24 HR  Active   Multiple Vitamins-Minerals (WOMENS 50+ MULTI VITAMIN PO)  Active   omeprazole (PRILOSEC) 20 MG delayed-release capsule  Active   TRULICITY 0.75 MG/0.5ML Solution Pen-injector  Active                    ALLERGIES  Allergies   Allergen Reactions    Latex Rash     Condons, rash  Latex gloves, rash       PHYSICAL EXAM  VITAL SIGNS: BP (!)  "181/97   Pulse 95   Temp 37.6 °C (99.7 °F) (Temporal)   Resp 18   Ht 1.651 m (5' 5\")   Wt 89.4 kg (197 lb 1.5 oz)   LMP 01/12/2010   SpO2 97%   BMI 32.80 kg/m²    ENT: Right tympanic membrane normal.  Posterior pharynx erythematous without swelling or exudate.  No facial swelling  Cardiac: Regular rate, regular rhythm  Respiratory: Clear lung sounds, no stridor  GI: Abdomen is soft and nontender  Skin warm dry, no shingles, no rash, no cellulitis  Psychiatric: Normal mood, cooperative   neurologic: Sensation and strength normal, speech clear    EKG/LABS  Results for orders placed or performed during the hospital encounter of 06/02/24   CBC WITH DIFFERENTIAL   Result Value Ref Range    WBC 19.0 (H) 4.8 - 10.8 K/uL    RBC 5.11 4.20 - 5.40 M/uL    Hemoglobin 13.9 12.0 - 16.0 g/dL    Hematocrit 43.4 37.0 - 47.0 %    MCV 84.9 81.4 - 97.8 fL    MCH 27.2 27.0 - 33.0 pg    MCHC 32.0 (L) 32.2 - 35.5 g/dL    RDW 40.0 35.9 - 50.0 fL    Platelet Count 227 164 - 446 K/uL    MPV 10.5 9.0 - 12.9 fL    Neutrophils-Polys 87.30 (H) 44.00 - 72.00 %    Lymphocytes 9.40 (L) 22.00 - 41.00 %    Monocytes 2.40 0.00 - 13.40 %    Eosinophils 0.20 0.00 - 6.90 %    Basophils 0.20 0.00 - 1.80 %    Immature Granulocytes 0.50 0.00 - 0.90 %    Nucleated RBC 0.00 0.00 - 0.20 /100 WBC    Neutrophils (Absolute) 16.61 (H) 1.82 - 7.42 K/uL    Lymphs (Absolute) 1.79 1.00 - 4.80 K/uL    Monos (Absolute) 0.46 0.00 - 0.85 K/uL    Eos (Absolute) 0.04 0.00 - 0.51 K/uL    Baso (Absolute) 0.03 0.00 - 0.12 K/uL    Immature Granulocytes (abs) 0.09 0.00 - 0.11 K/uL    NRBC (Absolute) 0.00 K/uL   COMP METABOLIC PANEL   Result Value Ref Range    Sodium 138 135 - 145 mmol/L    Potassium 3.8 3.6 - 5.5 mmol/L    Chloride 100 96 - 112 mmol/L    Co2 25 20 - 33 mmol/L    Anion Gap 13.0 7.0 - 16.0    Glucose 187 (H) 65 - 99 mg/dL    Bun 11 8 - 22 mg/dL    Creatinine 0.64 0.50 - 1.40 mg/dL    Calcium 8.9 8.5 - 10.5 mg/dL    Correct Calcium 8.7 8.5 - 10.5 mg/dL    " AST(SGOT) 13 12 - 45 U/L    ALT(SGPT) 15 2 - 50 U/L    Alkaline Phosphatase 103 (H) 30 - 99 U/L    Total Bilirubin 0.4 0.1 - 1.5 mg/dL    Albumin 4.2 3.2 - 4.9 g/dL    Total Protein 7.6 6.0 - 8.2 g/dL    Globulin 3.4 1.9 - 3.5 g/dL    A-G Ratio 1.2 g/dL   LIPASE   Result Value Ref Range    Lipase 33 11 - 82 U/L   TROPONIN   Result Value Ref Range    Troponin T <6 6 - 19 ng/L   Group A Strep by PCR    Specimen: Throat   Result Value Ref Range    Group A Strep by PCR DETECTED (A) Not Detected   ESTIMATED GFR   Result Value Ref Range    GFR (CKD-EPI) 107 >60 mL/min/1.73 m 2   EKG   Result Value Ref Range    Report       [facility] of [facility] Emergency Dept.    Test Date:  2024  Pt Name:    ALIX CARMEN Department: ER  MRN:        [id number]                      Room:  Gender:     Female                       Technician: [id number]  :        [id number]                   Requested By:ER TRIAGE PROTOCOL  Order #:    [id number]                    Reading MD: LUIS MIGUEL LORENZ MD    Measurements  Intervals                                Axis  Rate:       90                           P:          77  IA:         146                          QRS:        53  QRSD:       91                           T:          43  QT:         369  QTc:        452    Interpretive Statements  Sinus rhythm  Baseline wander in lead(s) I,II,aVR,aVL  Compared to ECG 2022 08:27:58  Sinus bradycardia no longer present  Electronically Signed On 2024 14:36:44 PDT by LUIS MIGUEL LORENZ MD         I have independently interpreted this EKG    RADIOLOGY/PROCEDURES   I have independently interpreted the diagnostic imaging associated with this visit and am waiting the final reading from the radiologist.   My preliminary interpretation is as follows: Chest x-ray is negative for pneumonia    Radiologist interpretation:  DX-CHEST-PORTABLE (1 VIEW)   Final Result      No acute cardiac or pulmonary abnormalities are identified.           COURSE & MEDICAL DECISION MAKING    ASSESSMENT, COURSE AND PLAN  Care Narrative: Patient presents with multiple complaints, sore throat for the last 9 days with turns out to be strep pharyngitis.  Other differential includes viral etiologies.  There is no evidence on exam of abscess, her voice is normal, she is able to swallow although with pain.  No asymmetric swelling in the posterior pharynx.  Patient treated with IV Decadron for help with pain and swelling, started on IV Unasyn for antibiotic coverage.  She will continue on Augmentin for an additional 10 days.  Second complaint was of chest pressure over the last 24 hours, negative troponin, negative EKG.  Her chest x-ray is negative for pneumonia or chest mass.  Pain is partially reproducible on palpation, suspect this to be sternal pain, possibly costochondritis.  Patient is given return precautions.  She is advised see a doctor later this week if not better.  Patient is tolerating oral intake, breathing comfortably at time of discharge.  Chest Pain: HEART Score: 3          ADDITIONAL PROBLEMS MANAGED  Leukocytosis: Likely secondary to strep pharyngitis, IV antibiotics followed by oral Augmentin.    Chest pressure: Unknown etiology, myocardial infarction is ruled out with negative troponin after greater than 6 hours of pain.  No ischemic change on EKG, chest x-ray also negative.    DISPOSITION AND DISCUSSIONS    Escalation of care considered, and ultimately not performed:acute inpatient care management, however at this time, the patient is most appropriate for outpatient management    Barriers to care at this time, including but not limited to:  None .     Decision tools and prescription drugs considered including, but not limited to: Pain Medications patient advised over-the-counter NSAIDs or Tylenol for pain control if needed, prescription pain medication was not required .    FINAL DIAGNOSIS  1. Strep pharyngitis    2. Leukocytosis, unspecified  type    3. Chest pressure           Electronically signed by: Clifford Tatum M.D., 6/2/2024 11:53 AM

## 2024-06-02 NOTE — DISCHARGE INSTRUCTIONS
See a doctor for recheck in 1 week if not better, return sooner  for difficulty breathing, inability to swallow, worsening of condition.

## 2024-06-02 NOTE — ED NOTES
Pt observed resting on gurney at this time, respirations even and unlabored.  No S/S of discomfort or distress at this time.

## 2024-06-03 LAB
FLUAV RNA SPEC QL NAA+PROBE: NEGATIVE
FLUBV RNA SPEC QL NAA+PROBE: NEGATIVE
RSV RNA SPEC QL NAA+PROBE: NEGATIVE
SARS-COV-2 RNA RESP QL NAA+PROBE: NOTDETECTED

## 2024-06-10 ENCOUNTER — PRE-ADMISSION TESTING (OUTPATIENT)
Dept: ADMISSIONS | Facility: MEDICAL CENTER | Age: 49
End: 2024-06-10
Attending: STUDENT IN AN ORGANIZED HEALTH CARE EDUCATION/TRAINING PROGRAM
Payer: MEDICAID

## 2024-06-10 DIAGNOSIS — Z01.810 PRE-OPERATIVE CARDIOVASCULAR EXAMINATION: ICD-10-CM

## 2024-06-10 DIAGNOSIS — Z01.812 PRE-OPERATIVE LABORATORY EXAMINATION: ICD-10-CM

## 2024-06-10 LAB
ANION GAP SERPL CALC-SCNC: 12 MMOL/L (ref 7–16)
BUN SERPL-MCNC: 12 MG/DL (ref 8–22)
CALCIUM SERPL-MCNC: 9.1 MG/DL (ref 8.5–10.5)
CHLORIDE SERPL-SCNC: 101 MMOL/L (ref 96–112)
CO2 SERPL-SCNC: 26 MMOL/L (ref 20–33)
CREAT SERPL-MCNC: 0.6 MG/DL (ref 0.5–1.4)
EKG IMPRESSION: NORMAL
ERYTHROCYTE [DISTWIDTH] IN BLOOD BY AUTOMATED COUNT: 41.4 FL (ref 35.9–50)
EST. AVERAGE GLUCOSE BLD GHB EST-MCNC: 186 MG/DL
GFR SERPLBLD CREATININE-BSD FMLA CKD-EPI: 109 ML/MIN/1.73 M 2
GLUCOSE SERPL-MCNC: 207 MG/DL (ref 65–99)
HBA1C MFR BLD: 8.1 % (ref 4–5.6)
HCT VFR BLD AUTO: 43.1 % (ref 37–47)
HGB BLD-MCNC: 14 G/DL (ref 12–16)
MCH RBC QN AUTO: 27.6 PG (ref 27–33)
MCHC RBC AUTO-ENTMCNC: 32.5 G/DL (ref 32.2–35.5)
MCV RBC AUTO: 84.8 FL (ref 81.4–97.8)
PLATELET # BLD AUTO: 264 K/UL (ref 164–446)
PMV BLD AUTO: 11.3 FL (ref 9–12.9)
POTASSIUM SERPL-SCNC: 4 MMOL/L (ref 3.6–5.5)
RBC # BLD AUTO: 5.08 M/UL (ref 4.2–5.4)
SODIUM SERPL-SCNC: 139 MMOL/L (ref 135–145)
WBC # BLD AUTO: 6.8 K/UL (ref 4.8–10.8)

## 2024-06-10 PROCEDURE — 36415 COLL VENOUS BLD VENIPUNCTURE: CPT

## 2024-06-10 PROCEDURE — 93005 ELECTROCARDIOGRAM TRACING: CPT

## 2024-06-10 PROCEDURE — 83036 HEMOGLOBIN GLYCOSYLATED A1C: CPT

## 2024-06-10 PROCEDURE — 85027 COMPLETE CBC AUTOMATED: CPT

## 2024-06-10 PROCEDURE — 80048 BASIC METABOLIC PNL TOTAL CA: CPT

## 2024-06-10 PROCEDURE — 93010 ELECTROCARDIOGRAM REPORT: CPT | Performed by: INTERNAL MEDICINE

## 2024-06-14 ASSESSMENT — FIBROSIS 4 INDEX: FIB4 SCORE: 0.64

## 2024-06-15 NOTE — H&P
Urogynecology & Reconstructive Pelvic Surgery - Pre-op H&P    Tanya Barrow [id number] :[id number]      Sheree pardo    Subjective     History of Presenting Illness:    Tanya Barrow is a 48 y.o. year old P5 with refractory incontinence who presents for sling excision    She understands her upcoming procedure and brings questions.  Since her last discussion I have been thinking about her case and think she would benefit from a urethral bulking at the time of her sling removal in order to give her some semblance of continence while awaiting possible retreatment for her stress incontinence.  This option was presented to her and she is enthusiastic about having this performed at the same time.  She reports good blood sugar control recently with most levels less than 180 mg/dL      Initial HPI: She presents for the evaluation and management of prolapse, severe incontinence, pelvic pain after multiple prior surgeries. She is currently most bothered by urinary incontinence with nearly all activities including exercise, urgency, and without sensation.  She has undergone 4 prior mid urethral sling procedures with Dr. Hernadez, none of which improved her symptoms and the last of which worsened her symptoms. She also has a subjective vaginal bulge, which feels worse than prior surgeries.  She has had 4 native tissue prolapse repairs.  After one of the surgeries, she had difficulty emptying her rectum and had to have mesh removed from the anal area during a revision, per her report.  She also has significant dyspareunia      Prior Pelvic surgery:   10/25/2016: BISMARK LUO, anterior/posterior repair, enterocele repair, sacrospinous suspension, transobturator sling  2017: Anterior/posterior pair, sacrospinous suspension, transobturator sling  10/17/2017: Anterior/posterior repair, vault suspension, uphold mesh, as obturator sling (Maricarmen)  2018: Anterior/posterior  "repair, \"tension-free tape\" (sounds like suprapubic sling from op note) (Hernadez)  2020: Robotic cholecystectomy     Prior treatment:   Pessary - reaction to material      Pelvic floor symptom review:     Bladder:   Voids per day: 15-20 Voids per night: 6-8      Urinary incontinence episodes per day: throughout the day    Urge leakage:  On Movement to Bathroom and Full Bladder   Stress leakage: With Cough, With Laugh, With Exercise, With [employer], With Bending/Squatting, Upon Standing, and Full Bladder   Continuous / insensible urine loss: Yes   Nocturnal enuresis: Yes   Leakage volume: Moderate   Number of pads/day: 7-10    Bladder emptying: Complete   Voiding symptoms: Post-Void Dribble and Double or Triple Voiding   UTI in last 12 months: no   Other urologic history: no      Prolapse:     Prolapse symptoms: Bulge and Pelvic Pressure   Degree of prolapse: To Introitus     Bowel:    Constipation: Yes   Bowel movements per day: 1    Straining to empty bowels: Yes   Splinting to evacuate: Yes   Painful evacuation: Yes   Difficulty emptying rectum: Yes   Incontinence to stool: no   Blood in stool: No    Hemorrhoids: No         Sexual function:    Sexually active: No    Gender of partners: Male   Pain with intercourse: yes,deep       Pelvic Pain: Yes, with walking/lifting      Past medical and surgical history    Past obstetric history   Number of vaginal deliveries: 4   Number of  deliveries: 0     Past gynecological history:    Last menstrual period: Patient's last menstrual period was 2010.   S/p hysterectomy      Past medical history:  Past Medical History:   Diagnosis Date    Urinary bladder disorder 2024    incontinence    High cholesterol 2024    on medication    Bowel habit changes 2024    constipation    Sleep apnea 2024    uses CPAP    Chest pain 2019    MPI with no ischemia or infarct, LVEF 72%.    Hypertension 2006    Medication    Anemia     Diabetes     Type 2 DM "    GERD (gastroesophageal reflux disease)     Heart burn     Hyperlipidemia     Obesity     Palpitations     Urinary incontinence      Past surgical history:  Past Surgical History:   Procedure Laterality Date    CHOLECYSTECTOMY ROBOTIC XI  12/9/2020    Procedure: CHOLECYSTECTOMY, ROBOT-ASSISTED, USING DA ROSA XI;  Surgeon: Nicho Copeland M.D.;  Location: SURGERY [facility];  Service: Gen Robotic    ANTERIOR AND POSTERIOR REPAIR  2/22/2018    Procedure: ANTERIOR AND POSTERIOR REPAIR;  Surgeon: Angel Hernadez M.D.;  Location: SURGERY SAME DAY [facility];  Service: Gynecology    ENTEROCELE REPAIR  2/22/2018    Procedure: ENTEROCELE REPAIR;  Surgeon: Angel Hernadez M.D.;  Location: SURGERY SAME DAY [facility];  Service: Gynecology    VAGINAL SUSPENSION N/A 2/22/2018    Procedure: VAGINAL SUSPENSION- SACROSPINOUS VAULT;  Surgeon: Angel Hernadez M.D.;  Location: SURGERY SAME DAY [facility];  Service: Gynecology    BLADDER SLING FEMALE N/A 2/22/2018    Procedure: BLADDER SLING FEMALE- TENSION FREE VAGINAL TAPE;  Surgeon: Angel Hernadez M.D.;  Location: SURGERY SAME DAY [facility];  Service: Gynecology    BLADDER SLING FEMALE  10/17/2017    Procedure: BLADDER SLING FEMALE - TENSION FREE TAPE PROCEDURE;  Surgeon: Angel Hernadez M.D.;  Location: SURGERY SAME DAY [facility];  Service: Gynecology    CYSTOSCOPY N/A 10/17/2017    Procedure: CYSTOSCOPY;  Surgeon: Angel Hernadez M.D.;  Location: SURGERY SAME DAY [facility];  Service: Gynecology    ANTERIOR AND POSTERIOR REPAIR  10/17/2017    Procedure: ANTERIOR AND POSTERIOR REPAIR W/UPHOLD MESH;  Surgeon: Angel Hernadez M.D.;  Location: SURGERY SAME DAY [facility];  Service: Gynecology    ENTEROCELE REPAIR  10/17/2017    Procedure: ENTEROCELE REPAIR - PERINEOPLASTY;  Surgeon: Angel Hernadez M.D.;  Location: SURGERY SAME DAY [facility];  Service: Gynecology    VAGINAL SUSPENSION  10/17/2017    Procedure: VAGINAL SUSPENSION - SACROSPINOUS VAULT;   Surgeon: Angel Hernadez M.D.;  Location: SURGERY SAME DAY [facility];  Service: Gynecology    BLADDER SLING FEMALE  4/11/2017    Procedure: BLADDER SLING FEMALE-TOT;  Surgeon: Angel Hernadez M.D.;  Location: SURGERY SAME DAY [facility];  Service:     ANTERIOR AND POSTERIOR REPAIR  4/11/2017    Procedure: ANTERIOR AND POSTERIOR REPAIR;  Surgeon: Angel Hernadez M.D.;  Location: SURGERY SAME DAY [facility];  Service:     ENTEROCELE REPAIR  4/11/2017    Procedure: ENTEROCELE REPAIR- PERINEOPLASTY;  Surgeon: Angel Hernadez M.D.;  Location: SURGERY SAME DAY [facility];  Service:     VAGINAL SUSPENSION  4/11/2017    Procedure: VAGINAL SUSPENSION-SACROSPINOUS VAULT;  Surgeon: Angel Hernadez M.D.;  Location: SURGERY SAME DAY [facility];  Service:     BLADDER SLING FEMALE  10/25/2016    Procedure: BLADDER SLING FEMALE TOT;  Surgeon: Angel Hernadez M.D.;  Location: SURGERY SAME DAY [facility];  Service:     VAGINAL HYSTERECTOMY SCOPE TOTAL  10/25/2016    Procedure: VAGINAL HYSTERECTOMY SCOPE TOTAL;  Surgeon: Angel Hernadez M.D.;  Location: SURGERY SAME DAY [facility];  Service:     SALPINGECTOMY Bilateral 10/25/2016    Procedure: SALPINGECTOMY;  Surgeon: Angel Hernadez M.D.;  Location: SURGERY SAME DAY [facility];  Service:     ANTERIOR AND POSTERIOR REPAIR  10/25/2016    Procedure: ANTERIOR AND POSTERIOR REPAIR;  Surgeon: Angel Hernadez M.D.;  Location: SURGERY SAME DAY [facility];  Service:     ENTEROCELE REPAIR  10/25/2016    Procedure: ENTEROCELE REPAIR, PERINEOPLASTY;  Surgeon: Angel Hernadez M.D.;  Location: SURGERY SAME DAY [facility];  Service:     VAGINAL SUSPENSION  10/25/2016    Procedure: VAGINAL SUSPENSION SACROSPINOUS VAULT ;  Surgeon: Angel Hernadez M.D.;  Location: SURGERY SAME DAY [facility];  Service:     OTHER      Tubal ligation     Medications:has a current medication list which includes the following prescription(s): trulicity, multiple vitamins-minerals,  jardiance, omeprazole, chlorthalidone, metoprolol sr, losartan, atorvastatin, metformin, insulin glargine, and aspirin ec.  Allergies:Latex  Family history:  Family History   Problem Relation Age of Onset    Diabetes Mother         pills    Hypertension Mother     Diabetes Father         insulin    Diabetes Paternal Grandmother     Diabetes Paternal Aunt      Social history: reports that she has never smoked. She has never been exposed to tobacco smoke. She has never used smokeless tobacco. She reports that she does not currently use alcohol. She reports that she does not use drugs.    Review of systems: A full review of systems was performed, and negative with the exception of want is noted above in the HPI.        Objective        Wt 197 lb 1.5 oz   LMP 01/12/2010   BMI 32.80 kg/m²     Physical Exam  Vitals reviewed. Exam conducted with a chaperone present (MA - see notes.).   Constitutional:       Appearance: Normal appearance.   HENT:      Head: Normocephalic.      Mouth/Throat:      Mouth: Mucous membranes are moist.   Cardiovascular:      Rate and Rhythm: Normal rate.   Pulmonary:      Effort: Pulmonary effort is normal.   Abdominal:      Palpations: Abdomen is soft. There is no mass.      Tenderness: There is no abdominal tenderness.   Skin:     General: Skin is warm and dry.   Neurological:      Mental Status: She is alert.   Psychiatric:         Mood and Affect: Mood normal.         Genitourinary:    Vulva: WNL   Bulbocavernosus reflex: Intact   Anal wink reflex: Intact   Perineal sensation: WNL   Urethra: Atrophic - no mesh visible. Pain reproduced on palpation of sling and obturator internus fascia   Vagina: Atrophic   Atrophy: Moderate   Cough stress test: Positive    Pelvic floor:    POP-Q: Aa -0.5 / Ba -0.5 / TVL 7 / C -4 / D x / Ap -1 / Bp -1 / GH 4 / PB 2   Prolapse stage: 2   Paravaginal defect: mild   Cervical elongation: No    Urethral tenderness: No    Bladder/ suprapubic tenderness:  Yes   Levator tenderness: Bilateral   Levator muscle tone: Hypertonic   Pelvic floor contraction strength (modified Oxford scale): 2=Weak   Pelvic floor contraction duration: Brief    Bimanual exam: no masses    Procedure Performed: No    Diagnostic test and records review:    Labs: n/a    Radiology: n/a    Procedural:     Urodynamics 2/2024  Filling phase: Just reached normal capacity with some difficulty,  some uninhibited detrusor contractions without leak, borderline MUCP. Stress leak at both half and full capacity  Voiding phase: Complete emptying via detrusor contraction       Documentation reviewed: Prior EMR Records, op reports (see HPI)           Assessment & Plan     Tanya Barrow is a 48 y.o. year old P5 with severe mixed urinary incontinence/OAB after 4 prior sling procedures, chronic pelvic pain/dyspareunia, mild cystocele. We discussed my recommendations for further diagnosis and treatment at length today.     She was previously given overall counseling that trying to undo the symptoms that she is currently feeling from her multiple prior surgeries often takes multiple steps.  Given the mild prolapse on exam and severe urinary symptoms, I think it is best to address her urinary function first, as well as her pain.    1. Urinary incontinence, mixed, OAB, nocturia  2. Incontinence without sensory awareness  3. History of suburethral sling procedure x4 (toMUSx3, rMUSx1)  - s/p Urodynamics with stress incontinence demonstrated with lower bladder capacity. . She was counseled that if she did not have her prior surgical history, I would recommend a primary ERIK procedure such as sling or bulking. On exam (and cysto) she has a tight tension band at the site of 4 prior slings, and this would limit the utility of any further intervention. I recommend sling and scar removal with likely steven plication, allow healing, and if ERIK continued consider new retropubic mesh or fascial sling. She  understands that her complex history requires a stepwise approach to treatment.   - Cystoscopy  ruled out mesh exposure into the bladder or any organic causes for her urinary leakage.   -  I strongly suspect, that given her urinary symptoms as well has her pain, that mesh excision and detensioning of the levator complex are can be the most important first step, however I am not comfortable offering this until I understand her urodynamic parameters.  I counseled today that after sling excision, she may have a persistent or worsening urinary incontinence, but I would prefer to allow healing and then potentially readdressing this, if necessary.  Multistep treatment course is very likely  - Discussion today I offered her urethral bulking at the time of the procedure in order to give her some continence mechanism during the recovery.  She understands that this may help her incontinence but she may ultimately require other treatments after healing from her mesh explantation.  - she opts to proceed with Excision of midurethral sling mesh, steven plication, urethral bulking, trigger point injections, and all indicated procedures.     Benefits of surgery were reviewed, including functional outcomes (bladder/bowel/sexual). Risks of surgery were  also discussed including anesthesia, bleeding, infection, damage to surrounding organs (bladder, ureter, urethra, bowel, blood vessel, nerves), possible blood transfusion, recurrent prolapse,  transient voiding dysfunction requiring catheterization, new/worsening urinary incontinence, pain with sex.     Specifically she was counselled as to what to expect on the day of surgery in the holding area, counseled that medical students may be involved in her care. She will likely go home on the same day as the surgery. She was counseled on what to expect in the recovery room including voiding trial and possible vaginal packing removal, as well as what to expect if voiding trial is  unsuccessful, which would be transient catheterization.   Discussed trajectory of recovery, including maximizing NSAIDs and Tylenol, and that narcotics are not routinely given.  Discussed restrictions including heavy lifting that requires straining, driving while on narcotics, nothing in the vagina and no bathing/swimming for at least 6 weeks until evaluated in the office.  Return to work discussed.  *Please see the corresponding after visit summary counseling packet for detailed counseling provided for the patient.     Pre-op meds: acetaminophen 1000mg PO, phenazopyridine 200mg PO, Cefazolin 2gm IV   Post-op medication plan: ibuprofen, tylenol, miralax   Home medication review: She should additionally hold all NSAIDs and supplements for 1 week prior to surgery.  Stop aspirin 1 week prior to surgery.        4. Cystocele, midline  Prior counseling: Despite significant Valsalva effort, I was only able to bring out a stage II cystocele with the Aa point at -0.5.  She also has significant atrophy and scarring from her prior surgeries.  I do not think correction of prolapse will significantly affect her urinary function, and the only option for prolapse repair at this point would be a sacrocolpopexy given the multiple prior failed native tissue repairs.  I counseled that I would recommend only observing her prolapse at this time, and continuing to use vaginal estrogen to improve tissue quality.  If prolapse is worse on future examinations or worse after addressing her urinary incontinence function, sacrocolpopexy may be our best next step.    5. Dyspareunia in female  Significant high tone pelvic floor and exacerbation of pain on palpation of the obturator internus fascia and sling insertion sites.  This leads me to believe that tensioning from her prior sling procedures is contributing to global pelvic floor dysfunction and likely her dyspareunia and chronic pelvic pain.  As noted above, I recommend likely excision of  her sling after evaluation of her bladder function  Previously referred to physical therapy, she will likely need this after surgery as well.    6. Vaginal atrophy  Continue vaginal estrogen, increase to daily for 2 weeks then back to twice per week to have quicker improvement in tissue quality    7. Chronic constipation  This is a longstanding issue.  Due to her constipation I do not recommend trial of anticholinergics before her bladder testing, as they may worsen her bowel function             Torie Maurice MD, FACOG  Urogynecology and Reconstructive Pelvic Surgery  Department of Obstetrics and Gynecology  [facility]        This medical record contains text that has been entered with the assistance of computer voice recognition and dictation software.  Therefore, it may contain unintended errors in text, spelling, punctuation, or grammar

## 2024-06-17 ENCOUNTER — HOSPITAL ENCOUNTER (OUTPATIENT)
Facility: MEDICAL CENTER | Age: 49
End: 2024-06-17
Attending: STUDENT IN AN ORGANIZED HEALTH CARE EDUCATION/TRAINING PROGRAM | Admitting: STUDENT IN AN ORGANIZED HEALTH CARE EDUCATION/TRAINING PROGRAM
Payer: MEDICAID

## 2024-06-17 ENCOUNTER — ANESTHESIA EVENT (OUTPATIENT)
Dept: SURGERY | Facility: MEDICAL CENTER | Age: 49
End: 2024-06-17
Payer: MEDICAID

## 2024-06-17 ENCOUNTER — PHARMACY VISIT (OUTPATIENT)
Dept: PHARMACY | Facility: MEDICAL CENTER | Age: 49
End: 2024-06-17
Payer: COMMERCIAL

## 2024-06-17 ENCOUNTER — ANESTHESIA (OUTPATIENT)
Dept: SURGERY | Facility: MEDICAL CENTER | Age: 49
End: 2024-06-17
Payer: MEDICAID

## 2024-06-17 VITALS
DIASTOLIC BLOOD PRESSURE: 82 MMHG | OXYGEN SATURATION: 93 % | HEART RATE: 54 BPM | RESPIRATION RATE: 19 BRPM | BODY MASS INDEX: 34.29 KG/M2 | WEIGHT: 200.84 LBS | TEMPERATURE: 97 F | HEIGHT: 64 IN | SYSTOLIC BLOOD PRESSURE: 156 MMHG

## 2024-06-17 DIAGNOSIS — G89.18 ACUTE POST-OPERATIVE PAIN: ICD-10-CM

## 2024-06-17 LAB
EKG IMPRESSION: NORMAL
GLUCOSE BLD STRIP.AUTO-MCNC: 151 MG/DL (ref 65–99)
PATHOLOGY CONSULT NOTE: NORMAL

## 2024-06-17 PROCEDURE — 700101 HCHG RX REV CODE 250: Performed by: ANESTHESIOLOGY

## 2024-06-17 PROCEDURE — 700111 HCHG RX REV CODE 636 W/ 250 OVERRIDE (IP): Mod: JZ,UD | Performed by: ANESTHESIOLOGY

## 2024-06-17 PROCEDURE — 700101 HCHG RX REV CODE 250: Mod: UD | Performed by: STUDENT IN AN ORGANIZED HEALTH CARE EDUCATION/TRAINING PROGRAM

## 2024-06-17 PROCEDURE — 160028 HCHG SURGERY MINUTES - 1ST 30 MINS LEVEL 3: Performed by: STUDENT IN AN ORGANIZED HEALTH CARE EDUCATION/TRAINING PROGRAM

## 2024-06-17 PROCEDURE — 700102 HCHG RX REV CODE 250 W/ 637 OVERRIDE(OP): Mod: UD | Performed by: STUDENT IN AN ORGANIZED HEALTH CARE EDUCATION/TRAINING PROGRAM

## 2024-06-17 PROCEDURE — 88300 SURGICAL PATH GROSS: CPT

## 2024-06-17 PROCEDURE — 82962 GLUCOSE BLOOD TEST: CPT

## 2024-06-17 PROCEDURE — 160025 RECOVERY II MINUTES (STATS): Performed by: STUDENT IN AN ORGANIZED HEALTH CARE EDUCATION/TRAINING PROGRAM

## 2024-06-17 PROCEDURE — 160048 HCHG OR STATISTICAL LEVEL 1-5: Performed by: STUDENT IN AN ORGANIZED HEALTH CARE EDUCATION/TRAINING PROGRAM

## 2024-06-17 PROCEDURE — 51715 ENDOSCOPIC INJECTION/IMPLANT: CPT | Performed by: STUDENT IN AN ORGANIZED HEALTH CARE EDUCATION/TRAINING PROGRAM

## 2024-06-17 PROCEDURE — 160039 HCHG SURGERY MINUTES - EA ADDL 1 MIN LEVEL 3: Performed by: STUDENT IN AN ORGANIZED HEALTH CARE EDUCATION/TRAINING PROGRAM

## 2024-06-17 PROCEDURE — 57287 REVISE/REMOVE SLING REPAIR: CPT | Mod: AS | Performed by: NURSE PRACTITIONER

## 2024-06-17 PROCEDURE — 51715 ENDOSCOPIC INJECTION/IMPLANT: CPT | Mod: AS | Performed by: NURSE PRACTITIONER

## 2024-06-17 PROCEDURE — 20552 NJX 1/MLT TRIGGER POINT 1/2: CPT | Performed by: STUDENT IN AN ORGANIZED HEALTH CARE EDUCATION/TRAINING PROGRAM

## 2024-06-17 PROCEDURE — 57287 REVISE/REMOVE SLING REPAIR: CPT | Performed by: STUDENT IN AN ORGANIZED HEALTH CARE EDUCATION/TRAINING PROGRAM

## 2024-06-17 PROCEDURE — 110454 HCHG SHELL REV 250: Performed by: STUDENT IN AN ORGANIZED HEALTH CARE EDUCATION/TRAINING PROGRAM

## 2024-06-17 PROCEDURE — A9270 NON-COVERED ITEM OR SERVICE: HCPCS | Mod: UD | Performed by: STUDENT IN AN ORGANIZED HEALTH CARE EDUCATION/TRAINING PROGRAM

## 2024-06-17 PROCEDURE — 93005 ELECTROCARDIOGRAM TRACING: CPT | Performed by: ANESTHESIOLOGY

## 2024-06-17 PROCEDURE — 700105 HCHG RX REV CODE 258: Mod: UD | Performed by: STUDENT IN AN ORGANIZED HEALTH CARE EDUCATION/TRAINING PROGRAM

## 2024-06-17 PROCEDURE — 160047 HCHG PACU  - EA ADDL 30 MINS PHASE II: Performed by: STUDENT IN AN ORGANIZED HEALTH CARE EDUCATION/TRAINING PROGRAM

## 2024-06-17 PROCEDURE — 700102 HCHG RX REV CODE 250 W/ 637 OVERRIDE(OP): Mod: UD | Performed by: ANESTHESIOLOGY

## 2024-06-17 PROCEDURE — L8606 SYNTHETIC IMPLNT URINARY 1ML: HCPCS | Performed by: STUDENT IN AN ORGANIZED HEALTH CARE EDUCATION/TRAINING PROGRAM

## 2024-06-17 PROCEDURE — 160046 HCHG PACU - 1ST 60 MINS PHASE II: Performed by: STUDENT IN AN ORGANIZED HEALTH CARE EDUCATION/TRAINING PROGRAM

## 2024-06-17 PROCEDURE — RXMED WILLOW AMBULATORY MEDICATION CHARGE: Performed by: NURSE PRACTITIONER

## 2024-06-17 PROCEDURE — 160035 HCHG PACU - 1ST 60 MINS PHASE I: Performed by: STUDENT IN AN ORGANIZED HEALTH CARE EDUCATION/TRAINING PROGRAM

## 2024-06-17 PROCEDURE — A9270 NON-COVERED ITEM OR SERVICE: HCPCS | Mod: UD | Performed by: ANESTHESIOLOGY

## 2024-06-17 PROCEDURE — 160036 HCHG PACU - EA ADDL 30 MINS PHASE I: Performed by: STUDENT IN AN ORGANIZED HEALTH CARE EDUCATION/TRAINING PROGRAM

## 2024-06-17 PROCEDURE — 160009 HCHG ANES TIME/MIN: Performed by: STUDENT IN AN ORGANIZED HEALTH CARE EDUCATION/TRAINING PROGRAM

## 2024-06-17 PROCEDURE — 700111 HCHG RX REV CODE 636 W/ 250 OVERRIDE (IP): Mod: UD | Performed by: STUDENT IN AN ORGANIZED HEALTH CARE EDUCATION/TRAINING PROGRAM

## 2024-06-17 PROCEDURE — 160002 HCHG RECOVERY MINUTES (STAT): Performed by: STUDENT IN AN ORGANIZED HEALTH CARE EDUCATION/TRAINING PROGRAM

## 2024-06-17 PROCEDURE — 93010 ELECTROCARDIOGRAM REPORT: CPT | Performed by: INTERNAL MEDICINE

## 2024-06-17 DEVICE — SYSTEM KIT BULKAMID URETHRAL BULKING (1/EA) ***MUST ORDER A MIN OF 5 EA***: Type: IMPLANTABLE DEVICE | Status: FUNCTIONAL

## 2024-06-17 RX ORDER — CEFAZOLIN SODIUM 1 G/3ML
INJECTION, POWDER, FOR SOLUTION INTRAMUSCULAR; INTRAVENOUS PRN
Status: DISCONTINUED | OUTPATIENT
Start: 2024-06-17 | End: 2024-06-17 | Stop reason: SURG

## 2024-06-17 RX ORDER — HALOPERIDOL 5 MG/ML
1 INJECTION INTRAMUSCULAR
Status: DISCONTINUED | OUTPATIENT
Start: 2024-06-17 | End: 2024-06-17 | Stop reason: HOSPADM

## 2024-06-17 RX ORDER — SCOLOPAMINE TRANSDERMAL SYSTEM 1 MG/1
1 PATCH, EXTENDED RELEASE TRANSDERMAL
Status: DISCONTINUED | OUTPATIENT
Start: 2024-06-17 | End: 2024-06-17 | Stop reason: HOSPADM

## 2024-06-17 RX ORDER — HYDROMORPHONE HYDROCHLORIDE 1 MG/ML
0.4 INJECTION, SOLUTION INTRAMUSCULAR; INTRAVENOUS; SUBCUTANEOUS
Status: DISCONTINUED | OUTPATIENT
Start: 2024-06-17 | End: 2024-06-17 | Stop reason: HOSPADM

## 2024-06-17 RX ORDER — HYDROMORPHONE HYDROCHLORIDE 1 MG/ML
0.1 INJECTION, SOLUTION INTRAMUSCULAR; INTRAVENOUS; SUBCUTANEOUS
Status: DISCONTINUED | OUTPATIENT
Start: 2024-06-17 | End: 2024-06-17 | Stop reason: HOSPADM

## 2024-06-17 RX ORDER — LIDOCAINE HYDROCHLORIDE 20 MG/ML
INJECTION, SOLUTION EPIDURAL; INFILTRATION; INTRACAUDAL; PERINEURAL PRN
Status: DISCONTINUED | OUTPATIENT
Start: 2024-06-17 | End: 2024-06-17 | Stop reason: SURG

## 2024-06-17 RX ORDER — OXYCODONE HCL 5 MG/5 ML
5 SOLUTION, ORAL ORAL
Status: COMPLETED | OUTPATIENT
Start: 2024-06-17 | End: 2024-06-17

## 2024-06-17 RX ORDER — MIDAZOLAM HYDROCHLORIDE 1 MG/ML
INJECTION INTRAMUSCULAR; INTRAVENOUS PRN
Status: DISCONTINUED | OUTPATIENT
Start: 2024-06-17 | End: 2024-06-17 | Stop reason: SURG

## 2024-06-17 RX ORDER — TRIAMCINOLONE ACETONIDE 40 MG/ML
INJECTION, SUSPENSION INTRA-ARTICULAR; INTRAMUSCULAR
Status: DISCONTINUED
Start: 2024-06-17 | End: 2024-06-17 | Stop reason: HOSPADM

## 2024-06-17 RX ORDER — ONDANSETRON 2 MG/ML
4 INJECTION INTRAMUSCULAR; INTRAVENOUS
Status: DISCONTINUED | OUTPATIENT
Start: 2024-06-17 | End: 2024-06-17 | Stop reason: HOSPADM

## 2024-06-17 RX ORDER — DEXAMETHASONE SODIUM PHOSPHATE 4 MG/ML
INJECTION, SOLUTION INTRA-ARTICULAR; INTRALESIONAL; INTRAMUSCULAR; INTRAVENOUS; SOFT TISSUE PRN
Status: DISCONTINUED | OUTPATIENT
Start: 2024-06-17 | End: 2024-06-17 | Stop reason: SURG

## 2024-06-17 RX ORDER — OXYCODONE HCL 5 MG/5 ML
10 SOLUTION, ORAL ORAL
Status: COMPLETED | OUTPATIENT
Start: 2024-06-17 | End: 2024-06-17

## 2024-06-17 RX ORDER — GABAPENTIN 100 MG/1
200 CAPSULE ORAL ONCE
Status: COMPLETED | OUTPATIENT
Start: 2024-06-17 | End: 2024-06-17

## 2024-06-17 RX ORDER — OXYCODONE HYDROCHLORIDE 5 MG/1
5 TABLET ORAL EVERY 8 HOURS PRN
Qty: 8 TABLET | Refills: 0 | Status: SHIPPED | OUTPATIENT
Start: 2024-06-17 | End: 2024-06-20

## 2024-06-17 RX ORDER — DIPHENHYDRAMINE HYDROCHLORIDE 50 MG/ML
12.5 INJECTION INTRAMUSCULAR; INTRAVENOUS
Status: DISCONTINUED | OUTPATIENT
Start: 2024-06-17 | End: 2024-06-17 | Stop reason: HOSPADM

## 2024-06-17 RX ORDER — LIDOCAINE HYDROCHLORIDE 10 MG/ML
INJECTION, SOLUTION EPIDURAL; INFILTRATION; INTRACAUDAL; PERINEURAL
Status: DISCONTINUED
Start: 2024-06-17 | End: 2024-06-17 | Stop reason: HOSPADM

## 2024-06-17 RX ORDER — PHENAZOPYRIDINE HYDROCHLORIDE 200 MG/1
200 TABLET, FILM COATED ORAL ONCE
Status: COMPLETED | OUTPATIENT
Start: 2024-06-17 | End: 2024-06-17

## 2024-06-17 RX ORDER — GENTAMICIN 40 MG/ML
INJECTION, SOLUTION INTRAMUSCULAR; INTRAVENOUS
Status: DISCONTINUED
Start: 2024-06-17 | End: 2024-06-17 | Stop reason: HOSPADM

## 2024-06-17 RX ORDER — SODIUM CHLORIDE, SODIUM LACTATE, POTASSIUM CHLORIDE, CALCIUM CHLORIDE 600; 310; 30; 20 MG/100ML; MG/100ML; MG/100ML; MG/100ML
INJECTION, SOLUTION INTRAVENOUS CONTINUOUS
Status: ACTIVE | OUTPATIENT
Start: 2024-06-17 | End: 2024-06-17

## 2024-06-17 RX ORDER — BUPIVACAINE HYDROCHLORIDE 2.5 MG/ML
INJECTION, SOLUTION EPIDURAL; INFILTRATION; INTRACAUDAL
Status: DISCONTINUED
Start: 2024-06-17 | End: 2024-06-17 | Stop reason: HOSPADM

## 2024-06-17 RX ORDER — TRIAMCINOLONE ACETONIDE 40 MG/ML
INJECTION, SUSPENSION INTRA-ARTICULAR; INTRAMUSCULAR
Status: DISCONTINUED | OUTPATIENT
Start: 2024-06-17 | End: 2024-06-17 | Stop reason: HOSPADM

## 2024-06-17 RX ORDER — EPINEPHRINE 1 MG/ML(1)
AMPUL (ML) INJECTION
Status: DISCONTINUED
Start: 2024-06-17 | End: 2024-06-17 | Stop reason: HOSPADM

## 2024-06-17 RX ORDER — GENTAMICIN 40 MG/ML
INJECTION, SOLUTION INTRAMUSCULAR; INTRAVENOUS
Status: DISCONTINUED | OUTPATIENT
Start: 2024-06-17 | End: 2024-06-17 | Stop reason: HOSPADM

## 2024-06-17 RX ORDER — BUPIVACAINE HYDROCHLORIDE 2.5 MG/ML
INJECTION, SOLUTION EPIDURAL; INFILTRATION; INTRACAUDAL
Status: DISCONTINUED | OUTPATIENT
Start: 2024-06-17 | End: 2024-06-17 | Stop reason: HOSPADM

## 2024-06-17 RX ORDER — ACETAMINOPHEN 500 MG
1000 TABLET ORAL ONCE
Status: COMPLETED | OUTPATIENT
Start: 2024-06-17 | End: 2024-06-17

## 2024-06-17 RX ORDER — HYDROMORPHONE HYDROCHLORIDE 1 MG/ML
0.2 INJECTION, SOLUTION INTRAMUSCULAR; INTRAVENOUS; SUBCUTANEOUS
Status: DISCONTINUED | OUTPATIENT
Start: 2024-06-17 | End: 2024-06-17 | Stop reason: HOSPADM

## 2024-06-17 RX ADMIN — ROCURONIUM BROMIDE 5 MG: 10 INJECTION, SOLUTION INTRAVENOUS at 13:24

## 2024-06-17 RX ADMIN — CEFAZOLIN 2 G: 1 INJECTION, POWDER, FOR SOLUTION INTRAMUSCULAR; INTRAVENOUS at 12:12

## 2024-06-17 RX ADMIN — FENTANYL CITRATE 50 MCG: 50 INJECTION, SOLUTION INTRAMUSCULAR; INTRAVENOUS at 14:03

## 2024-06-17 RX ADMIN — LIDOCAINE HYDROCHLORIDE 100 MG: 20 INJECTION, SOLUTION EPIDURAL; INFILTRATION; INTRACAUDAL at 12:08

## 2024-06-17 RX ADMIN — FENTANYL CITRATE 25 MCG: 50 INJECTION, SOLUTION INTRAMUSCULAR; INTRAVENOUS at 13:26

## 2024-06-17 RX ADMIN — PHENAZOPYRIDINE 200 MG: 200 TABLET ORAL at 10:53

## 2024-06-17 RX ADMIN — ACETAMINOPHEN 1000 MG: 500 TABLET, FILM COATED ORAL at 10:53

## 2024-06-17 RX ADMIN — SUGAMMADEX 200 MG: 100 INJECTION, SOLUTION INTRAVENOUS at 13:32

## 2024-06-17 RX ADMIN — SODIUM CHLORIDE, POTASSIUM CHLORIDE, SODIUM LACTATE AND CALCIUM CHLORIDE: 600; 310; 30; 20 INJECTION, SOLUTION INTRAVENOUS at 10:55

## 2024-06-17 RX ADMIN — ROCURONIUM BROMIDE 40 MG: 10 INJECTION, SOLUTION INTRAVENOUS at 12:08

## 2024-06-17 RX ADMIN — DEXAMETHASONE SODIUM PHOSPHATE 4 MG: 4 INJECTION INTRA-ARTICULAR; INTRALESIONAL; INTRAMUSCULAR; INTRAVENOUS; SOFT TISSUE at 12:08

## 2024-06-17 RX ADMIN — SCOPOLAMINE 1 PATCH: 1.5 PATCH, EXTENDED RELEASE TRANSDERMAL at 10:53

## 2024-06-17 RX ADMIN — OXYCODONE HYDROCHLORIDE 10 MG: 5 SOLUTION ORAL at 14:03

## 2024-06-17 RX ADMIN — MIDAZOLAM HYDROCHLORIDE 2 MG: 2 INJECTION, SOLUTION INTRAMUSCULAR; INTRAVENOUS at 12:06

## 2024-06-17 RX ADMIN — PROPOFOL 50 MG: 10 INJECTION, EMULSION INTRAVENOUS at 13:26

## 2024-06-17 RX ADMIN — GABAPENTIN 200 MG: 100 CAPSULE ORAL at 10:53

## 2024-06-17 RX ADMIN — PROPOFOL 150 MG: 10 INJECTION, EMULSION INTRAVENOUS at 12:08

## 2024-06-17 RX ADMIN — FENTANYL CITRATE 50 MCG: 50 INJECTION, SOLUTION INTRAMUSCULAR; INTRAVENOUS at 12:06

## 2024-06-17 ASSESSMENT — PAIN DESCRIPTION - PAIN TYPE
TYPE: SURGICAL PAIN

## 2024-06-17 ASSESSMENT — FIBROSIS 4 INDEX: FIB4 SCORE: 0.64

## 2024-06-17 ASSESSMENT — PAIN SCALES - GENERAL: PAIN_LEVEL: 2

## 2024-06-17 NOTE — OR NURSING
1343 - Pt to PACU from OR. Report from anesthesia and OR RN. On 6L O2 via mask. Respirations even and unlabored. VSS. Oral airway dc'd upon arrival. Peripad in place CDI. Sagastume in place.    1403 medicated for 7/10 pain with oxycodone and fentanyl     1427 Patient started having chest pain/heaviness, EKG ordered per Dr. Odlel    1430 EKG called     1440 - EKG at bedside    1445 - MD Odell notified that pt EKG done. Pt reports minimal chest pain.    1449 - Pt tolerating PO fluids.    1455 - Pt sister updated via telephone.    1530 - Bladder back-filled with 300 cc sterile water. Sagastume removed per order. Pt attempted to void unsuccessfully.     1600 - 14 [place]ese sagastume with leg bag placed. Pt and family educated on care and emptying. MD Maurice notified.    1615 - Pt to bathroom to dress and clean up with assistance from RN.     1654 - Discharge instructions reviewed with and signed by Suki, pt sister. All questions answered and verbalized understanding. Pt wheeled to car via wheelchair by CNA. PIV removed. NAD. VSS. Belongings with patient. Pt provided with pads and an extra leg bag.

## 2024-06-17 NOTE — ANESTHESIA POSTPROCEDURE EVALUATION
Patient: Tayna Barrow    Procedure Summary       Date: 06/17/24 Room / Location: [facility] ROOM 21 / SURGERY SAME DAY [facility]    Anesthesia Start: 1201 Anesthesia Stop: 1347    Procedures:       EXCISION OF MID URETHRAL SLING MESH, DORIS PLICATION, TRIGGER POINT INJECTION AND ALL INDICATED PROCEDURES, URETHRAL BULKING (Urethra)      INJECTION,TRIGGER POINT,VAGINAL/PELVIC (Vagina ) Diagnosis: (STRESS URINARY INCONTINENCE, PRIOR SLING PROCEDURE, DYSPAREUNIA, INTRINSIC SPHINCTER DEFICIENCY)    Surgeons: Torie Maurice M.D. Responsible Provider: Cash Odell M.D.    Anesthesia Type: general ASA Status: 2            Final Anesthesia Type: general  Last vitals  BP   Blood Pressure: (!) 177/98, NIBP: (!) 192/91    Temp   36.8 °C (98.3 °F)    Pulse   64   Resp   18    SpO2   96 %      Anesthesia Post Evaluation    Patient location during evaluation: PACU  Patient participation: complete - patient participated  Level of consciousness: awake and alert  Pain score: 2    Airway patency: patent  Anesthetic complications: no  Cardiovascular status: hemodynamically stable  Respiratory status: acceptable  Hydration status: euvolemic    PONV: none          There were no known notable events for this encounter.     Nurse Pain Score: 0 (NPRS)

## 2024-06-17 NOTE — DISCHARGE INSTRUCTIONS
Post-op: What to expect after your surgery    For urgent post-operative questions or concerns, please call Dr. Maurice's direct on-call cell line at [phone].     For less-urgent matters (Monday - Friday), you may send a message through [employer] or call the general Women's Health line at [phone] [phone].     Bladder function  Try to empty your bladder (urinate) at regular intervals by sitting on the toilet and relaxing.  You may need to adjust your positioning (lean forward or back) to empty the bladder fully. It is important that you do not push or strain to empty your bladder.     Call the surgeon at the number above if you cannot urinate. Also, call for treatment if you have signs and symptoms of a urinary tract infection, including:   Burning with urination  Bladder pain  Worsening need to urinate right away  Urine with a bad smell    If you are sent home with a catheter:   Empty the catheter bag when it becomes full. The bag should be kept at a level below your hips to drain properly. When you are asleep, the bag should dangle off the bed. and should dangle off of the bed while you are asleep. You will be called the day after you go home to schedule an office visit to test your bladder and remove the catheter.     Vaginal care:   Do not go swimming, take sitting baths, or have sexual intercourse for 6 weeks after surgery. Do not place anything in the vagina except vaginal estrogen cream, if instructed to do so by your surgeon.     Vaginal discharge and bleeding/spotting is also normal through the entire 6-week recovery. Sometimes small sutures will fall out of the vagina as they dissolve. This is normal. Contact the office with any heavy bleeding soaking through pads, bad-smelling discharge, or worsening pain.     Pain management:  Surgical pain is controlled in most patients with only non-steroidal anti-inflammatory drugs (NSAIDs, such as ibuprofen, “Advil”), and acetaminophen (Tylenol). These drugs can be  taken together without interaction.     In the hospital, your nurse will give you these medications at regular intervals to both treat and to prevent pain. If these are not controlling your pain, you may ask the nurse for additional medication.     When you go home, you will also take NSAIDs and acetaminophen for pain management. I recommend the following at-home pain regimen:    Take ibuprofen 800mg three times per day (every 8 hrs), along with tylenol 1000mg three times per day (every 8 hrs), for the first 5-7 days after surgery to prevent and treat pain and inflammation.   After 5-7 days, you may take 400mg iburpfen and 500mg tylenol as-needed   You may also take oxycodone 5 mg three times per day as needed (every 8 hrs) for acute post operative pain not controlled by ibuprofen and tylenol alone.     Sometimes, a short course of narcotics such as oxycodone and hydromorphone is  required, but this is not routine. We do not recommend using narcotics regularly as it can lead to constipation or dizziness, and falls.     Do not drive or operate heavy machinery while using narcotics. You are unlikely to become addicted if you need to take a narcotic medication a few times within the first week of your surgery.  After the first few days to one week, your pain should decrease and you should not have pain severe enough to need narcotics. If you continue having severe pain, contact your surgeon for re-evaluation.     Bowel function  Constipation is common after surgery. This means it may take several days before having a normal bowel movement. It is important to take extra steps to keep your stools soft to avoid straining with bowel movements. Straining may damage the prolapse repair before it has healed. Most patients will be given a prescription for a stool softener (docusate) as well as a gentle laxative (Miralax or lactulose). These mediations adds water to the stool to make it easier to pass. Take them daily  throughout your recovery. Hold for a day if you develop diarrhea.     Call us if you experience any repeated episodes of vomiting, worsening abdominal pain/bloating, or are unable to have a bowel movement for more than 3 days.     Activity restrictions  During the first 6 weeks avoid any type of heavy lifting that requires you to strain.  Gentle walking is good exercise. Start with about 10 minutes a day when you feel ready and build up gradually. Avoid repetitive squatting or bending at the waist. Avoid any fitness-type training, aerobics, etc. for at least 6 weeks after surgery. Generally, you will need 4-6 weeks off work. This period may be longer if you have a very physical job.    Return to sex  When your surgeon clears you to resume sex (if desired), begin slowly and to use enough lubrication to help ease the discomfort. Because your vagina and pelvis have been re-structured, and it can take time to get used to sex after surgery. As scar tissue softens over time you will feel less discomfort. If discomfort does not go away, contact the office to schedule an exam and to discuss other options. In some cases, your surgeon may prescribe a low dose of vaginal estrogen to help with vaginal dryness and pain that may happen with sex.     If any questions arise, call your provider.  If your provider is not available, please feel free to call the Surgical Center at [phone].    MEDICATIONS: Resume taking daily medication.  Take prescribed pain medication with food.  If no medication is prescribed, you may take non-aspirin pain medication if needed.  PAIN MEDICATION CAN BE VERY CONSTIPATING.  Take a stool softener or laxative such as senokot, pericolace, or milk of magnesia if needed.    Last pain medication given at 2:00 pm.     What to Expect Post Anesthesia    Rest and take it easy for the first 24 hours.  A responsible adult is recommended to remain with you during that time.  It is normal to feel sleepy.  We  encourage you to not do anything that requires balance, judgment or coordination.    FOR 24 HOURS DO NOT:  Drive, operate machinery or run household appliances.  Drink beer or alcoholic beverages.  Make important decisions or sign legal documents.    To avoid nausea, slowly advance diet as tolerated, avoiding spicy or greasy foods for the first day.  Add more substantial food to your diet according to your provider's instructions.  Babies can be fed formula or breast milk as soon as they are hungry.  INCREASE FLUIDS AND FIBER TO AVOID CONSTIPATION.    MILD FLU-LIKE SYMPTOMS ARE NORMAL.  YOU MAY EXPERIENCE GENERALIZED MUSCLE ACHES, THROAT IRRITATION, HEADACHE AND/OR SOME NAUSEA.

## 2024-06-17 NOTE — ANESTHESIA PREPROCEDURE EVALUATION
Case: [id number] Date/Time: 06/17/24 1130    Procedures:       EXCISION OF MID URETHRAL SLING MESH, DORIS PLICATION, TRIGGER POINT INJECTION AND ALL INDICATED PROCEDURES, URETHRAL BULKING      INJECTION,TRIGGER POINT,VAGINAL/PELVIC    Pre-op diagnosis: STRESS URINARY INCONTINENCE, PRIOR SLING PROCEDURE, DYSPAREUNIA, INTRINSIC SPHINCTER DEFICIENCY    Location: [facility] ROOM 21 / SURGERY SAME DAY [facility]    Surgeons: Torie Maurice M.D.            Relevant Problems   ANESTHESIA   (positive) Obstructive sleep apnea      CARDIAC   (positive) HTN (hypertension)      GI   (positive) GERD (gastroesophageal reflux disease)      ENDO   (positive) Type 2 diabetes mellitus without complication, with long-term current use of insulin (HCC)       Physical Exam    Airway   Mallampati: II  TM distance: >3 FB  Neck ROM: full       Cardiovascular - normal exam  Rhythm: regular  Rate: normal  (-) murmur     Dental - normal exam           Pulmonary - normal exam  Breath sounds clear to auscultation     Abdominal    Neurological - normal exam                   Anesthesia Plan    ASA 2       Plan - general       Airway plan will be ETT          Induction: intravenous    Postoperative Plan: Postoperative administration of opioids is intended.    Pertinent diagnostic labs and testing reviewed    Informed Consent:    Anesthetic plan and risks discussed with patient.    Use of blood products discussed with: patient whom consented to blood products.

## 2024-06-17 NOTE — ANESTHESIA TIME REPORT
Anesthesia Start and Stop Event Times       Date Time Event    6/17/2024 1128 Ready for Procedure     1201 Anesthesia Start     1347 Anesthesia Stop          Responsible Staff  06/17/24      Name Role Begin End    Cash Odell M.D. Anesth 1201 1347          Overtime Reason:  no overtime (within assigned shift)    Comments:

## 2024-06-17 NOTE — OP REPORT
OPERATIVE REPORT     Name: Tanya Barrow    : 1975    MRN: [id number]       PRE-OP DIAGNOSIS:   Mixed urinary incontinence  H/o prior sling procedure x4  Dyspareunia            POST-OP DIAGNOSIS:   Mixed urinary incontinence  H/o prior sling procedure x4  Dyspareunia              PROCEDURE:   Pelvic floor trigger point injections, 2 muscle groups (01092)  Excision of transobturator sling mesh (05355)  Urethral bulking (68202)  Vaginal relaxing incision, bilateral            SURGEON:   Torie Maurice MD - Primary  Da Meek APRN-RNFA - Assist              ANESTHESIA: General            FINDINGS:       - Exam under anesthesia: Significant tissue banding under the urethra and tense tissue band traveling from obturator to obturator.  There is also significant introital narrowing approximately 1 cm proximal to the hymenal ring, likely from prior surgeries and due to scar tissue.  Significant mesh found in multiple trajectories upon dissection, all visible and palpable mesh was excised.   - Cystourethroscopy: Performed after completion of relevant procedures. Normal appearing urothelium without lesions, masses, sutures, or perforations. Ureteral orifices noted in the normal orthotopic position, with brisk jets of urine bilaterally. Urethra was normal in appearance without evidence of stricture, perforation, or diverticulum. Poor coaptation before bulking, improved after bulking       ESTIMATED BLOOD LOSS: 50 mL            DRAINS: 14 Fr Dumont                   SPECIMENS:   ID Type Source Tests Collected by Time Destination   A : SLING MESH Other Other GROSS ONLY REQUEST Torie Maurice M.D. 2024 12:36 PM                IMPLANTS:   Implant Name Type Inv. Item Serial No.  Lot No. LRB No. Used Action   SYSTEM KIT BULKAMID URETHRAL BULKING (/EA)  Other SYSTEM KIT BULKAMID URETHRAL BULKING (  [employer] [id number]  1 Implanted               COMPLICATIONS: None             DISPOSITION: Discharge            CONDITION: Stable            INDICATION FOR SURGERY:     Tanya Barrow is a 48 y.o. year old P5 with severe mixed urinary incontinence/OAB after 4 prior sling procedures, chronic pelvic pain/dyspareunia. She was previously given overall counseling that trying to undo the symptoms that she is currently feeling from her multiple prior surgeries often takes multiple steps.  Given the mild prolapse on exam and severe urinary symptoms, I think it is best to address her urinary function first, as well as her pain.  She is s/p urodynamics with stress incontinence demonstrated with lower bladder capacity. She was counseled that if she did not have her prior surgical history, I would recommend a primary ERIK procedure such as sling or bulking. On exam (and cysto) she has a tight tension band at the site of 4 prior slings, and this would limit the utility of any further intervention. I recommend sling and scar removal with likely steven plication, allow healing, and if ERIK continued consider new retropubic mesh or fascial sling. She understands that her complex history requires a stepwise approach to treatment. Preop cystoscopy  ruled out mesh exposure into the bladder or any organic causes for her urinary leakage. I strongly suspect, that given her urinary symptoms as well has her pain, that mesh excision and detensioning of the levator complex are can be the most important first step. I counseled today that after sling excision, she may have a persistent or worsening urinary incontinence, but I would prefer to allow healing and then potentially readdressing this, if necessary.  Multistep treatment course is very likely. I offered her urethral bulking at the time of the procedure in order to give her some continence mechanism during the recovery.  She understands that this may help her incontinence but she may ultimately require other treatments after healing from her mesh  explantation. She opts to proceed with Excision of midurethral sling mesh, steven plication, urethral bulking, trigger point injections, and all indicated procedures.   All questions were answered and consent was signed and witnessed.      TECHNIQUE:    I spoke with the patient in the preoperative holding area, where again risks, benefits, indications, and alternatives were reviewed and informed consent was obtained.  She was then transferred to the operative suite, where she was identified, procedure was confirmed.  She was placed in dorsal supine position, given general endotracheal anesthesia without difficulty.  She was then placed in a high dorsal lithotomy position  in yellowfin stirrups with all pressure points padded.  She was prepared and draped in usual sterile fashion with vaginal chlorhexidine prep with all pressure points padded.  An appropriate time-out was performed, where patient was identified, procedure was confirmed, and the operative team was introduced.  It was also confirmed that patient did receive cefazolin 2 g IV for prophylaxis, and she also received DVT prophylaxis with sequential compression devices bilaterally throughout the case.  At this time, exam under anesthesia revealed findings noted above, including introital narrowing from scar tissue.  A 16-Gambian Dumont catheter was then placed in the patient's  bladder for drainage throughout the procedure, and a [place] retractor was placed for retraction.     Attention was first turned to the performance of the pelvic floor trigger point injections for her chronic pain and pelvic hypertonicity.  A solution was drawn up of 20 mL of 0.25% bupivacaine with epinephrine mixed with 40 mg triamcinolone.  At the insertion point of the transobturator sling mesh into the obturator internus fascia, 5 mL of the solution was injected into the muscle using a 22g spinal needle.  This was repeated on the contralateral  internus fascia.  Due to the  hypertonicity of the puborectalis muscles bilaterally, 2.5 mL injected into each side of the puborectalis muscle. Excellent hemostasis was noted.     Attention was then turned to the excision of the transobturator sling mesh.  The suburethral area was delineated with 3 Allis clamps, and the area was and was treated with solution of lidocaine with dilute epinephrine.  An inverted U incision was made with the apex approximately 1.5 cm proximal to the urethral meatus, and a flap was created down to approximately 1cm proximal to the bladder neck.  Significant scar tissue encountered. The mesh was initially encountered at the level of the bladder neck, this was carefully dissected away from the underlying urethra and surrounding periurethral epithelium. Multiple other trajectories of sling mesh were encountered, both transparent and blue-dyed, and these were individually excised until no further mesh visible or palpable. Once the sling and been teased away from the surrounding tissues this was carefully grasped with clamps and transected in the midline.  On the patient's right-hand side dissection continued, moving the mesh sling away from the underlying periurethral tissues and overlying vaginal epithelium into the level of the obturator internus fascia was reached.  All mesh was transected at the level of the obturator fascia. Good hemostasis was noted with expected small amount of venous oozing which was controlled well with Surgiflo and holding pressure.  The edges were then cleaned up of scar tissue and the epithelium reapproximated using a series of 2-0 Vicryl interrupted sutures.  Excellent hemostasis was noted at the end of the procedure.  Cystourethroscopy was then performed with a 70 degree cystoscope which found normal urothelium without any mesh exposures, lesions, masses, diverticula.  Normal urethra without any defects noted.     Significant scar tissue had to be divided in order to reach the sling material  despite freeing the edges of the closure, there was still significant introital stenosis approximately 1 cm proximal to the hymenal ring, present at initial  exam under anesthesia as well.  This is likely the cause of her dyspareunia, likely caused from her prior surgeries.  Decision was made to proceed with 2 relaxing incisions bilaterally.  On the patient's right-hand side the vaginal band was delineated and a longitudinal incision was made, approximately 2 cm in length, starting at the hymenal ring proceeding proximally.  The most proximal and distal apex of this incision was then closed, relaxing the prior vaginal band.  Additional 2-0 Vicryl interrupted sutures were placed for hemostasis.  Excellent relaxation of the band was noted.  This then repeated on the contralateral side with similar outcomes and excellent hemostasis noted.    The urethral bulking procedure then proceeded in the following fashion: The bulkamid urethroscope was then placed into the bladder which was inspected. The bulkamid needle was then advanced into the needle channel on the rotatable sheath until the tip of the sheath was adjacent to the bladder neck. The sheath was rotated to a 7 oclock position and the needle was then extended until he 2cm cyril was visible. The bulkamid system was then retracted until the tip of the needle was resting at the bladder neck. The needle was then retracted into the sheath and approximately 1.5cm from the bladder neck the Bulkamid systme was pressed parallel  against the urethral wall and the needle advanced into the submucosal tissue  ensuring that the bevel of the needle was facing the lumen. The needle was then advanced 0.5cm  and the bulkamind hydrogel was then injected until the cushion was visible and reached the midline of the urethral lumen. The needle was retracted back into the sheath and the scope rotated to the next injection site. Subsequent injections were performed at 5 o'clock, 2 o'clock  and 10 o'clock until all 4 cushions met in the midline. 2mL total bulkamid was used. A 14 Fr catheter was left in situ for post op voiding trial. Excellent hemostasis noted.       All counts were correct.  The patient was then wakened from general anesthesia easily, and brought to the PACU in stable condition. Anticipate discharge home today.         Torie Maurice MD, FACOG  Urogynecology and Reconstructive Pelvic Surgery  Department of Obstetrics and Gynecology  [facility]

## 2024-06-17 NOTE — ANESTHESIA PROCEDURE NOTES
Airway    Date/Time: 6/17/2024 12:09 PM    Performed by: Cash Odell M.D.  Authorized by: Cash Odell M.D.    Location:  OR  Urgency:  Elective  Difficult Airway: No    Indications for Airway Management:  Anesthesia      Spontaneous Ventilation: absent    Sedation Level:  Deep  Preoxygenated: Yes    Patient Position:  Sniffing  Final Airway Type:  Endotracheal airway  Final Endotracheal Airway:  ETT  Cuffed: Yes    Technique Used for Successful ETT Placement:  Direct laryngoscopy    Insertion Site:  Oral  Blade Type:  Donato  Laryngoscope Blade/Videolaryngoscope Blade Size:  3  ETT Size (mm):  7.0  Measured from:  Lips  ETT to Lips (cm):  22  Placement Verified by: auscultation and capnometry    Cormack-Lehane Classification:  Grade I - full view of glottis  Number of Attempts at Approach:  1

## 2024-06-18 ENCOUNTER — TELEPHONE (OUTPATIENT)
Dept: OBGYN | Facility: CLINIC | Age: 49
End: 2024-06-18
Payer: MEDICAID

## 2024-06-18 ENCOUNTER — TELEPHONE (OUTPATIENT)
Dept: GYNECOLOGY | Facility: CLINIC | Age: 49
End: 2024-06-18
Payer: MEDICAID

## 2024-06-18 DIAGNOSIS — G89.18 ACUTE POST-OPERATIVE PAIN: ICD-10-CM

## 2024-06-18 RX ORDER — IBUPROFEN 800 MG/1
800 TABLET ORAL EVERY 8 HOURS PRN
Qty: 30 TABLET | Refills: 0 | Status: SHIPPED | OUTPATIENT
Start: 2024-06-18

## 2024-06-18 NOTE — TELEPHONE ENCOUNTER
Post-operative phone call    I called Ms. Chad Barrow and confirmed her identity. She is POD 1 s/p Pelvic floor trigger point injections, 2 muscle groups. Excision of transobturator sling mesh. Urethral bulking. Vaginal relaxing incision, bilateral.     She reports doing well, pain controlled, ambulating, tolerating regular diet, not passing gas and not having normal bowel movement and will use Miralax, and sagastume catheter is draining well. Denies nausea, vomiting, fever, chills, SOB, or heavy vaginal bleeding. Reports mild dizziness which may attributable to anesthesia and oxycodone.  She took 1 oxycodone this morning.  Advised patient to utilize ibuprofen 800 mg along with Tylenol 1000 mg every 8 hours and to use oxycodone as needed.  Per patient's request ibuprofen 800 mg was sent to her pharmacy.  All questions answered. Advised patient to reach out via [employer] or call the office at [phone] should she have any questions or concerns prior to f/u.     She will follow up as scheduled on 7/16/24 with Dr. Maurice, or earlier if any issues arise. Patient will f/u tomorrow 6/19/24 to complete a void trial.       BLADIMIR Cortes, RNFA

## 2024-06-18 NOTE — TELEPHONE ENCOUNTER
Left VMTCB.  Please schedule patient for a Void Trial on MA visit with Mary Pope RN tomorrow anytime in the AM.

## 2024-06-18 NOTE — TELEPHONE ENCOUNTER
----- Message from Mary Mistry R.N. sent at 6/18/2024  7:21 AM PDT -----  Regarding: RE: void trial wednesday  Good morning Caitlyn,     If you could call her to schedule her and I can come in and do her voiding trail tomorrow. I can do any time in the am.     Thank you,   Mary LEMUS RN  ----- Message -----  From: Torie Maurice M.D.  Sent: 6/17/2024   4:32 PM PDT  To: Mary Mistry R.N.; #  Subject: void trial wednesday                             Hi team,     Please call this patient on Tuesday morning and schedule her for a repeat void trial on Wednesday.  Marycruz and I will be in the OR so we will not be present for the test.  She had a complex mesh excision and urethral bulking.  I will be happy if she is able to empty at least half of the volume that is filled.  If there is any unclear result, please perform a bladder scan to see how much is left over, anything greater than m:to have the Dumont replaced.  If she is unable to void, please be sure to place a 14 Kyr[place]z Dumont catheter, and let me know for scheduling a repeat trial 2 days later.     Contact/call me or Marycruz if you have any questions, but we will be in the OR.  If you need to get a hold of us but we are not picking up, you can call the [facility] , and asked to be transferred to room 15 in the [facility] OR suite.     Thanks!

## 2024-06-19 ENCOUNTER — NON-PROVIDER VISIT (OUTPATIENT)
Dept: OBGYN | Facility: CLINIC | Age: 49
End: 2024-06-19
Payer: MEDICAID

## 2024-06-19 NOTE — PROGRESS NOTES
6/17/2024  Torie Maurice M.D.  Rosa Heredia R.N.; Mary Mistry R.N.; KARY Cortes; Caitlyn Hough [facility]  Hi team,    Please call this patient on Tuesday morning and schedule her for a repeat void trial on Wednesday.  Marycruz and I will be in the OR so we will not be present for the test.  She had a complex mesh excision and urethral bulking.  I will be happy if she is able to empty at least half of the volume that is filled.  If there is any unclear result, please perform a bladder scan to see how much is left over, anything greater than m:to have the Dumont replaced.  If she is unable to void, please be sure to place a 14 [place] Dumont catheter, and let me know for scheduling a repeat trial 2 days later.    Contact/call me or Marycruz if you have any questions, but we will be in the OR.  If you need to get a hold of us but we are not picking up, you can call the [facility] , and asked to be transferred to room 15 in the [facility] OR suite.    Thanks!    06/19/24  Pt came in today for voiding trial as ordered by Dr. Maurice. Procedure explained to pt as above. Bladder back fill by gravity as ordered with 300 mL saline. Dumont catheter balloon deflated follow by removal of it, tip intact, pt tolerated well. Pt ambulated to bathroom, without assistance. Pt able to void 50 ml into the toilet hat and some in the toilet after 5min . Bladder scan done as ordered and obtained a reading of 406mls. After bladder scan pt requested to try again to void. Pt voided 50ml on the 2nd tried after 7min. 14 [place] Dumont inserted with verbal consent from pt.       LAURENCE Pearson  1975  48 y.o.  MRN:[id number]  6/19/2024    Time of procedure: 1150  Verbal/ Written consent obtained: Yes    Using sterile technique with the use of 2% lidocaine jelly per urethra.  A 14 [place] Dumont catheter was inserted into the bladder using sterile technique. The balloon was inflated  with 10 cc of sterile water..    There was not resistance noted. This did not require treatment.    The patient tolerated the procedure Good.  The patient has not been prescribes antibiotics.     Pt scheduled to follow for a repeat trail on Friday 6/21/2024 at 1000. Message sen to Dr. Maurice to notify.

## 2024-06-21 ENCOUNTER — NON-PROVIDER VISIT (OUTPATIENT)
Dept: OBGYN | Facility: CLINIC | Age: 49
End: 2024-06-21
Payer: MEDICAID

## 2024-06-21 NOTE — PROGRESS NOTES
Pt came in today for voiding trial as ordered by Dr. Maurice.  Bladder back fill by gravity as ordered with 300 mL saline. Sagastume catheter balloon deflated follow by removal of it, tip intact, pt tolerated well. Pt ambulated to bathroom, without assistance. Pt able to void 300 ml into the toilet hat.    Dr. Maurice notified and pt able to keep sagastume catheter out. Advised to pt to try to void at home every 2 hrs and Discussed with pt  when to come to ER if she start retaining urine, not able to void, develops low abdominal pain, develops fevers, chills. Pt agreed and verbalized understanding.

## 2024-06-22 ENCOUNTER — PATIENT MESSAGE (OUTPATIENT)
Dept: GYNECOLOGY | Facility: CLINIC | Age: 49
End: 2024-06-22
Payer: MEDICAID

## 2024-06-24 ENCOUNTER — OFFICE VISIT (OUTPATIENT)
Dept: URGENT CARE | Facility: CLINIC | Age: 49
End: 2024-06-24
Payer: MEDICAID

## 2024-06-24 ENCOUNTER — HOSPITAL ENCOUNTER (OUTPATIENT)
Facility: MEDICAL CENTER | Age: 49
End: 2024-06-24
Attending: FAMILY MEDICINE
Payer: MEDICAID

## 2024-06-24 VITALS
WEIGHT: 200 LBS | OXYGEN SATURATION: 95 % | BODY MASS INDEX: 34.15 KG/M2 | RESPIRATION RATE: 16 BRPM | DIASTOLIC BLOOD PRESSURE: 88 MMHG | HEART RATE: 65 BPM | HEIGHT: 64 IN | SYSTOLIC BLOOD PRESSURE: 132 MMHG | TEMPERATURE: 96.6 F

## 2024-06-24 DIAGNOSIS — N30.00 ACUTE CYSTITIS WITHOUT HEMATURIA: ICD-10-CM

## 2024-06-24 LAB
APPEARANCE UR: NORMAL
BILIRUB UR STRIP-MCNC: NEGATIVE MG/DL
COLOR UR AUTO: YELLOW
GLUCOSE UR STRIP.AUTO-MCNC: NORMAL MG/DL
KETONES UR STRIP.AUTO-MCNC: NEGATIVE MG/DL
LEUKOCYTE ESTERASE UR QL STRIP.AUTO: NORMAL
NITRITE UR QL STRIP.AUTO: NEGATIVE
PH UR STRIP.AUTO: 5.5 [PH] (ref 5–8)
PROT UR QL STRIP: NEGATIVE MG/DL
RBC UR QL AUTO: NORMAL
SP GR UR STRIP.AUTO: 1.01
UROBILINOGEN UR STRIP-MCNC: 0.2 MG/DL

## 2024-06-24 PROCEDURE — 99213 OFFICE O/P EST LOW 20 MIN: CPT | Performed by: FAMILY MEDICINE

## 2024-06-24 PROCEDURE — 87186 SC STD MICRODIL/AGAR DIL: CPT

## 2024-06-24 PROCEDURE — 3079F DIAST BP 80-89 MM HG: CPT | Performed by: FAMILY MEDICINE

## 2024-06-24 PROCEDURE — 3075F SYST BP GE 130 - 139MM HG: CPT | Performed by: FAMILY MEDICINE

## 2024-06-24 PROCEDURE — 87077 CULTURE AEROBIC IDENTIFY: CPT

## 2024-06-24 PROCEDURE — 81002 URINALYSIS NONAUTO W/O SCOPE: CPT | Performed by: FAMILY MEDICINE

## 2024-06-24 PROCEDURE — 87086 URINE CULTURE/COLONY COUNT: CPT

## 2024-06-24 RX ORDER — NITROFURANTOIN 25; 75 MG/1; MG/1
100 CAPSULE ORAL EVERY 12 HOURS
Qty: 10 CAPSULE | Refills: 0 | Status: SHIPPED | OUTPATIENT
Start: 2024-06-24 | End: 2024-06-29

## 2024-06-24 ASSESSMENT — ENCOUNTER SYMPTOMS: FEVER: 1

## 2024-06-24 ASSESSMENT — FIBROSIS 4 INDEX: FIB4 SCORE: 0.64

## 2024-06-24 NOTE — PROGRESS NOTES
Subjective:     Tanya Barrow is a 48 y.o. female who presents for UTI (X 3 days, pain with urination )    HPI  Pt presents for evaluation of an acute problem  Patient with pain with urination for the past 3 days  Dysuria is every void  Has urgency and frequency  Has a little bit of lower abdominal pain  No back pain/flank pain  States that she did have a fever few days ago and these have resolved  Pt with recent surgery 1 week ago to remove bladder mesh  Used a straight cath for the day after the surgery   Has hx of recurrent urinary tract infections, particularly after surgeries     Review of Systems   Constitutional:  Positive for fever.   Genitourinary:  Positive for dysuria, frequency and urgency.       PMH:  has a past medical history of Anemia, Bowel habit changes (05/17/2024), Chest pain (02/2019), Diabetes, GERD (gastroesophageal reflux disease), Heart burn, High cholesterol (05/17/2024), Hyperlipidemia, Hypertension (2006), Obesity, Palpitations, Sleep apnea (05/17/2024), Urinary bladder disorder (05/17/2024), and Urinary incontinence.  MEDS:   Current Outpatient Medications:     nitrofurantoin (MACROBID) 100 MG Cap, Take 1 Capsule by mouth every 12 hours for 5 days., Disp: 10 Capsule, Rfl: 0    ibuprofen (MOTRIN) 800 MG Tab, Take 1 Tablet by mouth every 8 hours as needed for Mild Pain or Moderate Pain., Disp: 30 Tablet, Rfl: 0    hydroCHLOROthiazide 12.5 MG tablet, Take 12.5 mg by mouth every morning.   DO NOT TAKE DAY OF SURGERY, Disp: , Rfl:     TRULICITY 0.75 MG/0.5ML Solution Pen-injector, Inject 0.5 mL as directed one time. Once Weekly   DO NOT TAKE 7 DAYS PRIOR TO SURGERY, Disp: , Rfl:     Multiple Vitamins-Minerals (WOMENS 50+ MULTI VITAMIN PO), Take 1 Tablet by mouth every day.   DO NOT TAKE 7 DAYS PRIOR TO SURGERY, Disp: , Rfl:     estradiol (ESTRACE VAGINAL) 0.1 MG/GM vaginal cream, Apply 1g cream inside vagina twice per week (Patient taking differently: Apply 1g cream inside  vagina twice per week. Takes Wednesday and Saturday  DO NOT TAKE DAY OF SURGERY), Disp: 1 Each, Rfl: 6    Empagliflozin (JARDIANCE) 25 MG Tab, Take 25 mg by mouth every day.   DO NOT TAKE 4 DAYS PRIOR TO SURGERY, Disp: , Rfl:     metoprolol SR (TOPROL XL) 50 MG TABLET SR 24 HR, Take 1 tablet by mouth every day. (Patient taking differently: Take 50 mg by mouth every day.   CONTINUE TAKING MED PRIOR TO SURGERY), Disp: 90 tablet, Rfl: 3    losartan (COZAAR) 100 MG Tab, Take 1 tablet by mouth every day. (Patient taking differently: Take 100 mg by mouth every morning.   DO NOT TAKE 24 HOURS PRIOR TO SURGERY), Disp: 30 tablet, Rfl: 11    atorvastatin (LIPITOR) 40 MG Tab, Take 40 mg by mouth every evening.   CONTINUE TAKING MED PRIOR TO SURGERY, Disp: , Rfl:     Insulin Glargine (BASAGLAR KWIKPEN) 100 UNIT/ML Solution Pen-injector, Inject 35 Units under the skin every evening.   COORDINATE MEDICATION INSTRUCTIONS FROM PRESCRIBING PHYSICIAN, Disp: , Rfl:     aspirin EC (ECOTRIN) 81 MG Tablet Delayed Response, Take 81 mg by mouth every morning.   COORDINATE MEDICATION INSTRUCTIONS FROM PRESCRIBING PHYSICIAN, Disp: , Rfl:   ALLERGIES:   Allergies   Allergen Reactions    Latex Rash     Condons, rash  Latex gloves, rash     SURGHX:   Past Surgical History:   Procedure Laterality Date    NC REVISION VAGINAL GRAFT, VAG APPROACH N/A 6/17/2024    Procedure: EXCISION OF MID URETHRAL SLING MESH, DORIS PLICATION, TRIGGER POINT INJECTION AND ALL INDICATED PROCEDURES, URETHRAL BULKING;  Surgeon: Torie Maurice M.D.;  Location: SURGERY SAME DAY [facility];  Service: Gynecology    INJECTION, TRIGGER POINT, VAGINAL/PELVIC N/A 6/17/2024    Procedure: INJECTION,TRIGGER POINT,VAGINAL/PELVIC;  Surgeon: Torie Maurice M.D.;  Location: SURGERY SAME DAY [facility];  Service: Gynecology    CHOLECYSTECTOMY ROBOTIC XI  12/9/2020    Procedure: CHOLECYSTECTOMY, ROBOT-ASSISTED, USING DA ROSA XI;  Surgeon: Nicho Copeland M.D.;  Location: [facility];  Service: Gen Robotic    ANTERIOR AND POSTERIOR REPAIR  2/22/2018    Procedure: ANTERIOR AND POSTERIOR REPAIR;  Surgeon: Angel Hernadez M.D.;  Location: SURGERY SAME DAY [facility];  Service: Gynecology    ENTEROCELE REPAIR  2/22/2018    Procedure: ENTEROCELE REPAIR;  Surgeon: Angel Hernadez M.D.;  Location: SURGERY SAME DAY [facility];  Service: Gynecology    VAGINAL SUSPENSION N/A 2/22/2018    Procedure: VAGINAL SUSPENSION- SACROSPINOUS VAULT;  Surgeon: Angel Hernadez M.D.;  Location: SURGERY SAME DAY [facility];  Service: Gynecology    BLADDER SLING FEMALE N/A 2/22/2018    Procedure: BLADDER SLING FEMALE- TENSION FREE VAGINAL TAPE;  Surgeon: Angel Hernadez M.D.;  Location: SURGERY SAME DAY [facility];  Service: Gynecology    BLADDER SLING FEMALE  10/17/2017    Procedure: BLADDER SLING FEMALE - TENSION FREE TAPE PROCEDURE;  Surgeon: Angel Hernadez M.D.;  Location: SURGERY SAME DAY [facility];  Service: Gynecology    CYSTOSCOPY N/A 10/17/2017    Procedure: CYSTOSCOPY;  Surgeon: Angel Hernadez M.D.;  Location: SURGERY SAME DAY [facility];  Service: Gynecology    ANTERIOR AND POSTERIOR REPAIR  10/17/2017    Procedure: ANTERIOR AND POSTERIOR REPAIR W/UPHOLD MESH;  Surgeon: Angel Hernadez M.D.;  Location: SURGERY SAME DAY [facility];  Service: Gynecology    ENTEROCELE REPAIR  10/17/2017    Procedure: ENTEROCELE REPAIR - PERINEOPLASTY;  Surgeon: Angel Hernadez M.D.;  Location: SURGERY SAME DAY [facility];  Service: Gynecology    VAGINAL SUSPENSION  10/17/2017    Procedure: VAGINAL SUSPENSION - SACROSPINOUS VAULT;  Surgeon: Angel Hernadez M.D.;  Location: SURGERY SAME DAY [facility];  Service: Gynecology    BLADDER SLING FEMALE  4/11/2017    Procedure: BLADDER SLING FEMALE-TOT;  Surgeon: Angel Hernadez M.D.;  Location: SURGERY SAME DAY [facility];  Service:     ANTERIOR AND POSTERIOR REPAIR  4/11/2017    Procedure: ANTERIOR AND POSTERIOR REPAIR;  Surgeon: Angel Hernadez  "M.D.;  Location: SURGERY SAME DAY [facility];  Service:     ENTEROCELE REPAIR  4/11/2017    Procedure: ENTEROCELE REPAIR- PERINEOPLASTY;  Surgeon: Angel Hernadez M.D.;  Location: SURGERY SAME DAY [facility];  Service:     VAGINAL SUSPENSION  4/11/2017    Procedure: VAGINAL SUSPENSION-SACROSPINOUS VAULT;  Surgeon: Angel Hernadez M.D.;  Location: SURGERY SAME DAY [facility];  Service:     BLADDER SLING FEMALE  10/25/2016    Procedure: BLADDER SLING FEMALE TOT;  Surgeon: Angel Hernadez M.D.;  Location: SURGERY SAME DAY [facility];  Service:     VAGINAL HYSTERECTOMY SCOPE TOTAL  10/25/2016    Procedure: VAGINAL HYSTERECTOMY SCOPE TOTAL;  Surgeon: Angel Hernadez M.D.;  Location: SURGERY SAME DAY [facility];  Service:     SALPINGECTOMY Bilateral 10/25/2016    Procedure: SALPINGECTOMY;  Surgeon: Angel Hernadez M.D.;  Location: SURGERY SAME DAY [facility];  Service:     ANTERIOR AND POSTERIOR REPAIR  10/25/2016    Procedure: ANTERIOR AND POSTERIOR REPAIR;  Surgeon: Angel Hernadez M.D.;  Location: SURGERY SAME DAY [facility];  Service:     ENTEROCELE REPAIR  10/25/2016    Procedure: ENTEROCELE REPAIR, PERINEOPLASTY;  Surgeon: Angel Hernadez M.D.;  Location: SURGERY SAME DAY [facility];  Service:     VAGINAL SUSPENSION  10/25/2016    Procedure: VAGINAL SUSPENSION SACROSPINOUS VAULT ;  Surgeon: Angel Hernadez M.D.;  Location: SURGERY SAME DAY [facility];  Service:     OTHER      Tubal ligation     SOCHX:  reports that she has never smoked. She has never been exposed to tobacco smoke. She has never used smokeless tobacco. She reports that she does not currently use alcohol. She reports that she does not use drugs.     Objective:   /88 (BP Location: Left arm, Patient Position: Sitting, BP Cuff Size: Large adult)   Pulse 65   Temp 35.9 °C (96.6 °F) (Temporal)   Resp 16   Ht 1.626 m (5' 4\")   Wt 90.7 kg (200 lb)   LMP 01/12/2010   SpO2 95%   BMI 34.33 kg/m²     Physical " Exam  Constitutional:       General: She is not in acute distress.     Appearance: She is well-developed. She is not diaphoretic.   Pulmonary:      Effort: Pulmonary effort is normal.   Abdominal:      General: Abdomen is flat. There is no distension.      Palpations: Abdomen is soft.      Tenderness: There is no right CVA tenderness, left CVA tenderness, guarding or rebound.      Comments: Mild tenderness in the umbilical region   Neurological:      Mental Status: She is alert.         Assessment/Plan:   Assessment    1. Acute cystitis without hematuria  - POCT Urinalysis  - Urine Culture; Future  - nitrofurantoin (MACROBID) 100 MG Cap; Take 1 Capsule by mouth every 12 hours for 5 days.  Dispense: 10 Capsule; Refill: 0    Patient with cystitis.  Recommended treatment with nitrofurantoin and send urine for culture.  Follow-up culture results.

## 2024-06-24 NOTE — PATIENT COMMUNICATION
I contact patient via phone call.  Patient states on Friday she experienced a  fever of 101 that went away with the Tylenol and Ibuprofen that she has been taking post operatively.   Patient denies any current fever or chills. Current symptoms include bladder pain and dysuria.   Patient was advised to head to an urgent care to drop off a urine sample while we await Dr. Maurice's response. Patient verbalized understanding.

## 2024-06-27 LAB
BACTERIA UR CULT: ABNORMAL
BACTERIA UR CULT: ABNORMAL
SIGNIFICANT IND 70042: ABNORMAL
SITE SITE: ABNORMAL
SOURCE SOURCE: ABNORMAL

## 2024-07-16 ENCOUNTER — HOSPITAL ENCOUNTER (OUTPATIENT)
Facility: MEDICAL CENTER | Age: 49
End: 2024-07-16
Attending: STUDENT IN AN ORGANIZED HEALTH CARE EDUCATION/TRAINING PROGRAM
Payer: MEDICAID

## 2024-07-16 ENCOUNTER — GYNECOLOGY VISIT (OUTPATIENT)
Dept: GYNECOLOGY | Facility: CLINIC | Age: 49
End: 2024-07-16
Payer: MEDICAID

## 2024-07-16 VITALS
DIASTOLIC BLOOD PRESSURE: 80 MMHG | BODY MASS INDEX: 34.84 KG/M2 | WEIGHT: 203 LBS | HEART RATE: 69 BPM | SYSTOLIC BLOOD PRESSURE: 145 MMHG

## 2024-07-16 DIAGNOSIS — M62.89 HIGH-TONE PELVIC FLOOR DYSFUNCTION: ICD-10-CM

## 2024-07-16 DIAGNOSIS — R39.9 UTI SYMPTOMS: ICD-10-CM

## 2024-07-16 DIAGNOSIS — N39.46 URINARY INCONTINENCE, MIXED: Primary | ICD-10-CM

## 2024-07-16 DIAGNOSIS — N83.209 CYST OF OVARY, UNSPECIFIED LATERALITY: ICD-10-CM

## 2024-07-16 LAB
APPEARANCE UR: NORMAL
BILIRUB UR STRIP-MCNC: NEGATIVE MG/DL
COLOR UR AUTO: NORMAL
GLUCOSE UR STRIP.AUTO-MCNC: NORMAL MG/DL
KETONES UR STRIP.AUTO-MCNC: NEGATIVE MG/DL
LEUKOCYTE ESTERASE UR QL STRIP.AUTO: NORMAL
NITRITE UR QL STRIP.AUTO: NEGATIVE
PH UR STRIP.AUTO: 6.5 [PH] (ref 5–8)
PROT UR QL STRIP: NEGATIVE MG/DL
RBC UR QL AUTO: NORMAL
SP GR UR STRIP.AUTO: 1.01
UROBILINOGEN UR STRIP-MCNC: NORMAL MG/DL

## 2024-07-16 PROCEDURE — 87186 SC STD MICRODIL/AGAR DIL: CPT

## 2024-07-16 PROCEDURE — 81002 URINALYSIS NONAUTO W/O SCOPE: CPT | Performed by: STUDENT IN AN ORGANIZED HEALTH CARE EDUCATION/TRAINING PROGRAM

## 2024-07-16 PROCEDURE — 87077 CULTURE AEROBIC IDENTIFY: CPT

## 2024-07-16 PROCEDURE — 87086 URINE CULTURE/COLONY COUNT: CPT

## 2024-07-16 PROCEDURE — 3077F SYST BP >= 140 MM HG: CPT | Performed by: STUDENT IN AN ORGANIZED HEALTH CARE EDUCATION/TRAINING PROGRAM

## 2024-07-16 PROCEDURE — 3079F DIAST BP 80-89 MM HG: CPT | Performed by: STUDENT IN AN ORGANIZED HEALTH CARE EDUCATION/TRAINING PROGRAM

## 2024-07-16 PROCEDURE — 99024 POSTOP FOLLOW-UP VISIT: CPT | Performed by: STUDENT IN AN ORGANIZED HEALTH CARE EDUCATION/TRAINING PROGRAM

## 2024-07-16 ASSESSMENT — FIBROSIS 4 INDEX: FIB4 SCORE: 0.65

## 2024-07-18 RX ORDER — SULFAMETHOXAZOLE AND TRIMETHOPRIM 800; 160 MG/1; MG/1
1 TABLET ORAL 2 TIMES DAILY
Qty: 6 TABLET | Refills: 0 | Status: SHIPPED | OUTPATIENT
Start: 2024-07-18 | End: 2024-07-21

## 2024-11-20 NOTE — H&P
DATE OF SCHEDULED SURGERY:  2017    REASON FOR SURGERY:  Recurrent pelvic floor relaxation, pelvic pain, type 2   stress urinary incontinence as demonstrated by urodynamic studies along with   dyspareunia and type 2 stress urinary incontinence recurrence.    HISTORY OF PRESENT ILLNESS:  This is a 41-year-old woman  5, para 5, 5   previous vaginal deliveries, the largest infant 9 pounds 8 ounces with near   complete uterovaginal prolapse, previously undergone a pelvic floor repair,   now with recurrent pelvic floor relaxation and recurrent stress urinary   incontinence.  Patient postoperatively after her previous procedure had   significant constipation, pelvic pain bearing down and had a recurrence   shortly after surgery.  Expected management and pelvic floor exercises were   encouraged and the patient proceeded forward with this approach for a period   of time; however, now states that she is no longer interested in proceeding   forward with conservative measures.  She has declined pessary therapy, any   other pelvic floor exercises or biofeedback therapy and other conservative   measures, now interested in proceeding forward with attempted definitive   surgical correction with an anterior and posterior vaginal repair, enterocele   repair, perineoplasty, and transobturator tape midurethral sling procedure and   a probable sacrospinous vault suspension.  Risks, benefits, alternatives of   all individual procedures were addressed with the patient in detail over   several visits.  She has asked appropriate questions, signed the appropriate   consents and wishes to proceed forward with surgery as planned.    PAST MEDICAL HISTORY:  Adult onset diabetes mellitus, chronic hypertension,   otherwise as stated above.    PAST SURGICAL HISTORY:  As above.    OBSTETRICAL HISTORY:  Five previous vaginal deliveries between  and .    GYNECOLOGIC HISTORY:  The patient has been amenorrheic since her previous    surgery.  She reports significant pelvic pain, dyspareunia, which she   attributes to large bulge at the vaginal introitus in association with mixed   urinary incontinence symptoms.  She has been counseled regarding risks,   benefits, and alternatives of proceeding forward with the surgery above.  She   does understand her pelvic pain, dyspareunia in addition to her urinary   incontinence, especially the overactive bladder component, maybe unimproved   with surgery as described above, may need additional medical or surgical   management.  Her pelvic pain, dyspareunia may actually worsen with the surgery   as described above.  She is aware of these concerns and is interested in   proceeding forward with surgery nonetheless.    SOCIAL HISTORY:  She is a social drinker.  She denies use of any tobacco.  She   denies use of any other drug use.  She is .  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    MEDICATIONS:  Iron sulfate 325 mg daily, losartan 100 mg p.o. daily, metformin   1000 mg twice daily, omeprazole 20 mg p.o. daily.    ALLERGIES:  No known drug allergies.    PHYSICAL EXAMINATION:  VITAL SIGNS:  She is afebrile, hemodynamically stable.  HEART:  Regular rate and rhythm.  CHEST:  Clear to auscultation bilaterally.  ABDOMEN:  Soft, nondistended, nontender, although the exam was difficult   secondary to the patient's increased body mass index.  PELVIC:  Shows normal external female genitalia.  Grade II anterior and   posterior vaginal relaxation with a grade II apical vaginal relaxation as   well.  She does have tenderness to palpation at the vaginal cuff.  No adnexal   masses or tenderness to palpation were appreciated.  EXTREMITIES:  Nontender.    LABORATORY STUDIES:  Urodynamic studies confirmed type 2 stress urinary   incontinence.    ASSESSMENT AND PLAN:  A 41-year-old woman  5, para 5 with symptomatic   pelvic floor relaxation, dyspareunia, pelvic pain, mixed urinary incontinence   symptoms,  Name band; interested in proceeding forward with surgery as described above.    The risks, benefits, alternatives of all individual procedures were addressed   with the patient in detail over several visits.  She has asked appropriate   questions, signed the appropriate consents and is wishing to proceed forward   with surgery as planned.       ____________________________________     MD SUSY OROURKE / LILLIAN    DD:  04/06/2017 08:21:21  DT:  04/06/2017 08:57:18    D#:  [id number]  Job#:  [id number]

## 2025-01-02 ENCOUNTER — GYNECOLOGY VISIT (OUTPATIENT)
Dept: GYNECOLOGY | Facility: CLINIC | Age: 50
End: 2025-01-02
Payer: MEDICAID

## 2025-01-02 VITALS — SYSTOLIC BLOOD PRESSURE: 154 MMHG | DIASTOLIC BLOOD PRESSURE: 84 MMHG

## 2025-01-02 DIAGNOSIS — M62.89 HIGH-TONE PELVIC FLOOR DYSFUNCTION: ICD-10-CM

## 2025-01-02 DIAGNOSIS — N81.11 CYSTOCELE, MIDLINE: ICD-10-CM

## 2025-01-02 DIAGNOSIS — N94.10 DYSPAREUNIA IN FEMALE: ICD-10-CM

## 2025-01-02 DIAGNOSIS — N39.3 FEMALE STRESS INCONTINENCE: ICD-10-CM

## 2025-01-02 DIAGNOSIS — N83.209 CYST OF OVARY, UNSPECIFIED LATERALITY: ICD-10-CM

## 2025-01-02 PROCEDURE — 3077F SYST BP >= 140 MM HG: CPT | Performed by: STUDENT IN AN ORGANIZED HEALTH CARE EDUCATION/TRAINING PROGRAM

## 2025-01-02 PROCEDURE — 3079F DIAST BP 80-89 MM HG: CPT | Performed by: STUDENT IN AN ORGANIZED HEALTH CARE EDUCATION/TRAINING PROGRAM

## 2025-01-02 PROCEDURE — 99214 OFFICE O/P EST MOD 30 MIN: CPT | Performed by: STUDENT IN AN ORGANIZED HEALTH CARE EDUCATION/TRAINING PROGRAM

## 2025-01-02 NOTE — PROGRESS NOTES
Urogynecology & Reconstructive Pelvic Surgery - Follow Up    Tanya Barrow MRN:[id number] :1975    Referred by: Cassie Bates DO    Reason for Visit:   Chief Complaint   Patient presents with    Follow-Up     Exam - Poss prolapse        Camila [id number]    Subjective     History of Presenting Illness:    Tanya Barrow is a 49 y.o. year old P5 with refractory incontinence who presents for follow up     She was last seen in July, and is s/p excision of transobturator sling mesh, urethral bulking, vaginal relaxation incision, pelvic floor trigger point injections (2024).    She now has leaking with walking, but also with urgency, equal bother. This is larger leakage, requiring 6 pads/day.     She has not been able to get in with physical therapy yet, still reports dyspareunia    She has not yet had a follow-up ultrasound to follow-up for cyst    Initial HPI: She presents for the evaluation and management of prolapse, severe incontinence, pelvic pain after multiple prior surgeries. She is currently most bothered by urinary incontinence with nearly all activities including exercise, urgency, and without sensation.  She has undergone 4 prior mid urethral sling procedures with Dr. Hernadez, none of which improved her symptoms and the last of which worsened her symptoms. She also has a subjective vaginal bulge, which feels worse than prior surgeries.  She has had 4 native tissue prolapse repairs.  After one of the surgeries, she had difficulty emptying her rectum and had to have mesh removed from the anal area during a revision, per her report.  She also has significant dyspareunia      Prior Pelvic surgery:   10/25/2016: BISMARK LUO, anterior/posterior repair, enterocele repair, sacrospinous suspension, transobturator sling  2017: Anterior/posterior pair, sacrospinous suspension, transobturator sling  10/17/2017: Anterior/posterior repair, vault suspension, uphold mesh,  "as obturator sling (Hernadez)  2018: Anterior/posterior repair, \"tension-free tape\" (sounds like suprapubic sling from op note) (Hernadez)  2020: Robotic cholecystectomy  24: excision of transobturator sling mesh, urethral bulking, vaginal relaxation incision, pelvic floor trigger point injections (Josafat)     Prior treatment:   Pessary - reaction to material      Pelvic floor symptom review:     Bladder:   Voids per day: 15-20 --> 10 Voids per night: 6-8 -->2   Urinary incontinence episodes per day: throughout the day    Urge leakage:  On Movement to Bathroom and Full Bladder --> improved   Stress leakage: With Cough, With Laugh, With Exercise, With [place], With Bending/Squatting, Upon Standing, and Full Bladder --> improved s/p surgery --> subsequently returned to baseline   Continuous / insensible urine loss: Yes   Nocturnal enuresis: Yes   Leakage volume: Moderate   Number of pads/day: 7-10    Bladder emptying: Complete   Voiding symptoms: Post-Void Dribble and Double or Triple Voiding   UTI in last 12 months: no   Other urologic history: no      Prolapse:     Prolapse symptoms: improved     Bowel:    Constipation: Yes   Bowel movements per day: 1    Straining to empty bowels: Yes   Splinting to evacuate: Yes   Painful evacuation: Yes   Difficulty emptying rectum: Yes   Incontinence to stool: no   Blood in stool: No    Hemorrhoids: No         Sexual function:    Sexually active: No    Gender of partners: Male   Pain with intercourse: yes,deep       Pelvic Pain: Yes, with walking/lifting      Past medical and surgical history    Past obstetric history   Number of vaginal deliveries: 4   Number of  deliveries: 0     Past gynecological history:    Last menstrual period: Patient's last menstrual period was 2010.   S/p hysterectomy      Past medical history:  Past Medical History:   Diagnosis Date    Urinary bladder disorder 2024    incontinence    High cholesterol 2024    on " medication    Bowel habit changes 05/17/2024    constipation    Sleep apnea 05/17/2024    uses CPAP    Chest pain 02/2019    MPI with no ischemia or infarct, LVEF 72%.    Hypertension 2006    Medication    Anemia     Diabetes     Type 2 DM    GERD (gastroesophageal reflux disease)     Heart burn     Hyperlipidemia     Obesity     Palpitations     Urinary incontinence      Past surgical history:  Past Surgical History:   Procedure Laterality Date    GA REVISION VAGINAL GRAFT, VAG APPROACH N/A 6/17/2024    Procedure: EXCISION OF MID URETHRAL SLING MESH, DORIS PLICATION, TRIGGER POINT INJECTION AND ALL INDICATED PROCEDURES, URETHRAL BULKING;  Surgeon: Torie Maurice M.D.;  Location: SURGERY SAME DAY [facility];  Service: Gynecology    INJECTION, TRIGGER POINT, VAGINAL/PELVIC N/A 6/17/2024    Procedure: INJECTION,TRIGGER POINT,VAGINAL/PELVIC;  Surgeon: Torie Maurice M.D.;  Location: SURGERY SAME DAY [facility];  Service: Gynecology    CHOLECYSTECTOMY ROBOTIC XI  12/9/2020    Procedure: CHOLECYSTECTOMY, ROBOT-ASSISTED, USING DA ROSA XI;  Surgeon: Nicho Copeland M.D.;  Location: SURGERY [facility];  Service: Gen Robotic    ANTERIOR AND POSTERIOR REPAIR  2/22/2018    Procedure: ANTERIOR AND POSTERIOR REPAIR;  Surgeon: Angel Hernadez M.D.;  Location: SURGERY SAME DAY [facility];  Service: Gynecology    ENTEROCELE REPAIR  2/22/2018    Procedure: ENTEROCELE REPAIR;  Surgeon: Angel Hernadez M.D.;  Location: SURGERY SAME DAY [facility];  Service: Gynecology    VAGINAL SUSPENSION N/A 2/22/2018    Procedure: VAGINAL SUSPENSION- SACROSPINOUS VAULT;  Surgeon: Angel Hernadez M.D.;  Location: SURGERY SAME DAY [facility];  Service: Gynecology    BLADDER SLING FEMALE N/A 2/22/2018    Procedure: BLADDER SLING FEMALE- TENSION FREE VAGINAL TAPE;  Surgeon: Angel Hernadez M.D.;  Location: SURGERY SAME DAY [facility];  Service: Gynecology    BLADDER SLING FEMALE  10/17/2017    Procedure: BLADDER SLING FEMALE - TENSION FREE  TAPE PROCEDURE;  Surgeon: Angel Hernadez M.D.;  Location: SURGERY SAME DAY [facility];  Service: Gynecology    CYSTOSCOPY N/A 10/17/2017    Procedure: CYSTOSCOPY;  Surgeon: Angel Hernadez M.D.;  Location: SURGERY SAME DAY [facility];  Service: Gynecology    ANTERIOR AND POSTERIOR REPAIR  10/17/2017    Procedure: ANTERIOR AND POSTERIOR REPAIR W/UPHOLD MESH;  Surgeon: Angel Hernadez M.D.;  Location: SURGERY SAME DAY [facility];  Service: Gynecology    ENTEROCELE REPAIR  10/17/2017    Procedure: ENTEROCELE REPAIR - PERINEOPLASTY;  Surgeon: Angel Hernadez M.D.;  Location: SURGERY SAME DAY [facility];  Service: Gynecology    VAGINAL SUSPENSION  10/17/2017    Procedure: VAGINAL SUSPENSION - SACROSPINOUS VAULT;  Surgeon: Angel Hernadez M.D.;  Location: SURGERY SAME DAY [facility];  Service: Gynecology    BLADDER SLING FEMALE  4/11/2017    Procedure: BLADDER SLING FEMALE-TOT;  Surgeon: Angel Hernadez M.D.;  Location: SURGERY SAME DAY [facility];  Service:     ANTERIOR AND POSTERIOR REPAIR  4/11/2017    Procedure: ANTERIOR AND POSTERIOR REPAIR;  Surgeon: Angel Hernadez M.D.;  Location: SURGERY SAME DAY [facility];  Service:     ENTEROCELE REPAIR  4/11/2017    Procedure: ENTEROCELE REPAIR- PERINEOPLASTY;  Surgeon: Angel Hernadez M.D.;  Location: SURGERY SAME DAY [facility];  Service:     VAGINAL SUSPENSION  4/11/2017    Procedure: VAGINAL SUSPENSION-SACROSPINOUS VAULT;  Surgeon: Angel Hernadez M.D.;  Location: SURGERY SAME DAY [facility];  Service:     BLADDER SLING FEMALE  10/25/2016    Procedure: BLADDER SLING FEMALE TOT;  Surgeon: Angel Hernadez M.D.;  Location: SURGERY SAME DAY [facility];  Service:     VAGINAL HYSTERECTOMY SCOPE TOTAL  10/25/2016    Procedure: VAGINAL HYSTERECTOMY SCOPE TOTAL;  Surgeon: Angel Hernadez M.D.;  Location: SURGERY SAME DAY [facility];  Service:     SALPINGECTOMY Bilateral 10/25/2016    Procedure: SALPINGECTOMY;  Surgeon: Angel Hernadez M.D.;   Location: SURGERY SAME DAY [facility];  Service:     ANTERIOR AND POSTERIOR REPAIR  10/25/2016    Procedure: ANTERIOR AND POSTERIOR REPAIR;  Surgeon: Angel Hernadez M.D.;  Location: SURGERY SAME DAY [facility];  Service:     ENTEROCELE REPAIR  10/25/2016    Procedure: ENTEROCELE REPAIR, PERINEOPLASTY;  Surgeon: Angel Hernadez M.D.;  Location: SURGERY SAME DAY [facility];  Service:     VAGINAL SUSPENSION  10/25/2016    Procedure: VAGINAL SUSPENSION SACROSPINOUS VAULT ;  Surgeon: Angel Hernadez M.D.;  Location: SURGERY SAME DAY [facility];  Service:     OTHER      Tubal ligation     Medications:has a current medication list which includes the following prescription(s): ibuprofen, hydrochlorothiazide, trulicity, multiple vitamins-minerals, estradiol, jardiance, metoprolol sr, losartan, atorvastatin, insulin glargine, and aspirin ec.  Allergies:Latex and Nitrofurantoin  Family history:  Family History   Problem Relation Age of Onset    Diabetes Mother         pills    Hypertension Mother     Diabetes Father         insulin    Diabetes Paternal Grandmother     Diabetes Paternal Aunt      Social history: reports that she has never smoked. She has never been exposed to tobacco smoke. She has never used smokeless tobacco. She reports that she does not currently use alcohol. She reports that she does not use drugs.    Review of systems: A full review of systems was performed, and negative with the exception of want is noted above in the HPI.        Objective        BP (!) 154/84 (BP Location: Left arm, Patient Position: Sitting, BP Cuff Size: Adult)   LMP 01/12/2010     Physical Exam  Vitals reviewed. Exam conducted with a chaperone present (MA - see notes.).   Constitutional:       Appearance: Normal appearance.   HENT:      Head: Normocephalic.      Mouth/Throat:      Mouth: Mucous membranes are moist.   Cardiovascular:      Rate and Rhythm: Normal rate.   Pulmonary:      Effort: Pulmonary effort is normal.    Abdominal:      Palpations: Abdomen is soft. There is no mass.      Tenderness: There is no abdominal tenderness.   Skin:     General: Skin is warm and dry.   Neurological:      Mental Status: She is alert.   Psychiatric:         Mood and Affect: Mood normal.         Genitourinary:    Vulva: WNL   Bulbocavernosus reflex: Intact   Anal wink reflex: Intact   Perineal sensation: WNL   Urethra: Hypermobile, positive cough stress test.  Some protrusion of the urethra, no overt prolapse vagina: Atrophic -vaginal band improved, Vicryl sutures still healing   Atrophy: Moderate   Cough stress test: Positive    Pelvic floor:    POP-Q: Repeat today Aa -1 / Ba -1 / TVL 9 / C -6 / D x / Ap -1 / Bp -1 / GH 4.5 / PB 2   Bladder/ suprapubic tenderness: Yes   Levator tenderness: Bilateral   Levator muscle tone: Hypertonic -->  persistent   Pelvic floor contraction strength (modified Oxford scale): 2=Weak   Pelvic floor contraction duration: Brief    Bimanual exam: no masses    Procedure Performed: No    Diagnostic test and records review:    PVR today: 38 mL    Labs: n/a    Radiology: n/a    Procedural:     Urodynamics 2/2024  Filling phase: Just reached normal capacity with some difficulty,  some uninhibited detrusor contractions without leak, borderline MUCP. Stress leak at both half and full capacity  Voiding phase: Complete emptying via detrusor contraction       Documentation reviewed: Prior EMR Records, op reports (see HPI)           Assessment & Plan     Tanya Barrow is a 49 y.o. year old P5 with severe mixed urinary incontinence/OAB after 4 prior TOT sling procedures, chronic pelvic pain/dyspareunia, mild cystocele, now post op s/p mesh removal and revision with bulking, recurrent UI      1. Urinary incontinence, mixed, OAB, nocturia  2. Incontinence without sensory awareness  3. History of suburethral sling procedure x4 (toMUSx3, rMUSx1)  -She is initially doing well, however unfortunately her urinary  incontinence symptoms have returned, mostly stress incontinence but also with urgency.  He had a very specific and detailed counseling about her prior surgeries and our next steps.  She has had 4 slings placed previously, which she is counseled is very unusual, as these were all transobturator sling's, and data supports any repeat sling procedure be performed in a different manner likely from a retropubic approach.  All of her transobturator sling synapsin removed and she has persistent stress incontinence.  She is counseled on other approaches for stress incontinence which are all procedural nature including urethral bulking, retropubic sling, fascial sling, Han.  She does not want to temporary management such as a pessary.  Given the severity of her incontinence, and lack of discrete mesh complications such as an exposure (mesh was removed from overtensioning), I still think she would be an ideal candidate for a retropubic mid urethral sling as the next step.  A fascial sling would likely be high risk without better outcomes, han would not be successful.  -She understands this counseling and would like to move forward with scheduling a retropubic mid urethral sling, possible anterior repair, and all indicated procedures.      4. Cystocele, midline  Stage II cystocele, although most of the protruding tissue is from the periurethral tissues.  I counseled that while she has some prolapse, it is overall minimal, and due to her significant pelvic floor hypertonicity, dyspareunia, and tenderness after her last surgeries, I am hesitant to perform any extensive prolapse repair at this time.  However, since removing for with a sling, a an anterior plication can be performed to minimize her cystocele, which she agrees to.  I do not recommend sacrospinous suspension or posterior repair at this time.    5. Dyspareunia in female  - s/p vaginal band revision   -Continue significant hypertonic pelvic floor musculature  including puborectalis muscles with tenderness.  Again, she is counseled that this is a physical therapy issue rather than a surgical issue, and strongly reinforced following up with PT.  - PT referral re-sent today     6. Vaginal atrophy  Continue vaginal estrogen twice weekly    7. Chronic constipation  Not addressed today. This is a longstanding issue.  Due to her constipation I do not recommend trial of anticholinergics before her bladder testing, as they may worsen her bowel function      8. Ovarian cyst  Complex ovarian cyst on prior ultrasound, repeat ultrasound ordered..          Torie Maurice MD, FACOG  Urogynecology and Reconstructive Pelvic Surgery  Department of Obstetrics and Gynecology  [facility]        This medical record contains text that has been entered with the assistance of computer voice recognition and dictation software.  Therefore, it may contain unintended errors in text, spelling, punctuation, or grammar

## 2025-01-07 ENCOUNTER — PATIENT MESSAGE (OUTPATIENT)
Dept: GYNECOLOGY | Facility: CLINIC | Age: 50
End: 2025-01-07
Payer: MEDICAID

## 2025-01-09 ENCOUNTER — HOSPITAL ENCOUNTER (OUTPATIENT)
Dept: RADIOLOGY | Facility: MEDICAL CENTER | Age: 50
End: 2025-01-09
Attending: STUDENT IN AN ORGANIZED HEALTH CARE EDUCATION/TRAINING PROGRAM
Payer: MEDICAID

## 2025-01-09 DIAGNOSIS — N83.209 CYST OF OVARY, UNSPECIFIED LATERALITY: ICD-10-CM

## 2025-01-09 PROCEDURE — 76830 TRANSVAGINAL US NON-OB: CPT

## 2025-01-13 NOTE — PROGRESS NOTES
"Urogynecology & Reconstructive Pelvic Surgery - Follow Up    Tanya Barrow MRN:[id number] :1975    Referred by: Cassie Bates DO    Reason for Visit:   Chief Complaint   Patient presents with    Follow-Up     Pre Op        Yas [id number]    Subjective     History of Presenting Illness:    Tanya Barrow is a 49 y.o. year old P5 with refractory incontinence who presents for follow up     She previously underwent excision of her 4 prior transobturator sling's due to dyspareunia and urgency, overall improved, especially with bladder emptying pain and urgency, but her stress incontinence slowly worsened over time and is now very bothersome.  He requires several pads per day.  She is now planned for a repeat retropubic approach sling      Initial HPI: She presents for the evaluation and management of prolapse, severe incontinence, pelvic pain after multiple prior surgeries. She is currently most bothered by urinary incontinence with nearly all activities including exercise, urgency, and without sensation.  She has undergone 4 prior mid urethral sling procedures with Dr. Hernadez, none of which improved her symptoms and the last of which worsened her symptoms. She also has a subjective vaginal bulge, which feels worse than prior surgeries.  She has had 4 native tissue prolapse repairs.  After one of the surgeries, she had difficulty emptying her rectum and had to have mesh removed from the anal area during a revision, per her report.  She also has significant dyspareunia      Prior Pelvic surgery:   10/25/2016: BISMARK LUO, anterior/posterior repair, enterocele repair, sacrospinous suspension, transobturator sling  2017: Anterior/posterior pair, sacrospinous suspension, transobturator sling  10/17/2017: Anterior/posterior repair, vault suspension, uphold mesh, as obturator sling (Maricarmen)  2018: Anterior/posterior repair, \"tension-free tape\" (sounds like " suprapubic sling from op note) (Hernadez)  2020: Robotic cholecystectomy  24: excision of transobturator sling mesh, urethral bulking, vaginal relaxation incision, pelvic floor trigger point injections (Josafat)     Prior treatment:   Pessary - reaction to material      Pelvic floor symptom review:     Bladder:   Voids per day: 15-20 --> 10 Voids per night: 6-8 -->2   Urinary incontinence episodes per day: throughout the day    Urge leakage:  On Movement to Bathroom and Full Bladder --> improved   Stress leakage: With Cough, With Laugh, With Exercise, With [place], With Bending/Squatting, Upon Standing, and Full Bladder --> improved s/p surgery --> subsequently returned to baseline   Continuous / insensible urine loss: Yes   Nocturnal enuresis: Yes   Leakage volume: Moderate   Number of pads/day: 7-10    Bladder emptying: Complete   Voiding symptoms: Post-Void Dribble and Double or Triple Voiding   UTI in last 12 months: no   Other urologic history: no      Prolapse:     Prolapse symptoms: improved     Bowel:    Constipation: Yes   Bowel movements per day: 1    Straining to empty bowels: Yes   Splinting to evacuate: Yes   Painful evacuation: Yes   Difficulty emptying rectum: Yes   Incontinence to stool: no   Blood in stool: No    Hemorrhoids: No         Sexual function:    Sexually active: No    Gender of partners: Male   Pain with intercourse: yes,deep       Pelvic Pain: Yes, with walking/lifting      Past medical and surgical history    Past obstetric history   Number of vaginal deliveries: 4   Number of  deliveries: 0     Past gynecological history:    Last menstrual period: Patient's last menstrual period was 2010.   S/p hysterectomy      Past medical history:  Past Medical History:   Diagnosis Date    Urinary bladder disorder 2024    incontinence    High cholesterol 2024    on medication    Bowel habit changes 2024    constipation    Sleep apnea 2024    uses CPAP     Chest pain 02/2019    MPI with no ischemia or infarct, LVEF 72%.    Hypertension 2006    Medication    Anemia     Diabetes     Type 2 DM    GERD (gastroesophageal reflux disease)     Heart burn     Hyperlipidemia     Obesity     Palpitations     Urinary incontinence      Past surgical history:  Past Surgical History:   Procedure Laterality Date    RI REVISION VAGINAL GRAFT, VAG APPROACH N/A 6/17/2024    Procedure: EXCISION OF MID URETHRAL SLING MESH, DORIS PLICATION, TRIGGER POINT INJECTION AND ALL INDICATED PROCEDURES, URETHRAL BULKING;  Surgeon: Torie Maurice M.D.;  Location: SURGERY SAME DAY [facility];  Service: Gynecology    INJECTION, TRIGGER POINT, VAGINAL/PELVIC N/A 6/17/2024    Procedure: INJECTION,TRIGGER POINT,VAGINAL/PELVIC;  Surgeon: Torie Maurice M.D.;  Location: SURGERY SAME DAY [facility];  Service: Gynecology    CHOLECYSTECTOMY ROBOTIC XI  12/9/2020    Procedure: CHOLECYSTECTOMY, ROBOT-ASSISTED, USING DA ROSA XI;  Surgeon: Nicho Copeland M.D.;  Location: [facility];  Service: Gen Robotic    ANTERIOR AND POSTERIOR REPAIR  2/22/2018    Procedure: ANTERIOR AND POSTERIOR REPAIR;  Surgeon: Angel Hernadez M.D.;  Location: SURGERY SAME DAY [facility];  Service: Gynecology    ENTEROCELE REPAIR  2/22/2018    Procedure: ENTEROCELE REPAIR;  Surgeon: Angel Hernadez M.D.;  Location: SURGERY SAME DAY [facility];  Service: Gynecology    VAGINAL SUSPENSION N/A 2/22/2018    Procedure: VAGINAL SUSPENSION- SACROSPINOUS VAULT;  Surgeon: Angel Hernadez M.D.;  Location: SURGERY SAME DAY [facility];  Service: Gynecology    BLADDER SLING FEMALE N/A 2/22/2018    Procedure: BLADDER SLING FEMALE- TENSION FREE VAGINAL TAPE;  Surgeon: Angel Hernadez M.D.;  Location: SURGERY SAME DAY [facility];  Service: Gynecology    BLADDER SLING FEMALE  10/17/2017    Procedure: BLADDER SLING FEMALE - TENSION FREE TAPE PROCEDURE;  Surgeon: Angel Hernadez M.D.;  Location: SURGERY SAME DAY [facility];  Service:  Gynecology    CYSTOSCOPY N/A 10/17/2017    Procedure: CYSTOSCOPY;  Surgeon: Angel Hernadez M.D.;  Location: SURGERY SAME DAY [facility];  Service: Gynecology    ANTERIOR AND POSTERIOR REPAIR  10/17/2017    Procedure: ANTERIOR AND POSTERIOR REPAIR W/UPHOLD MESH;  Surgeon: Angel Hernadez M.D.;  Location: SURGERY SAME DAY [facility];  Service: Gynecology    ENTEROCELE REPAIR  10/17/2017    Procedure: ENTEROCELE REPAIR - PERINEOPLASTY;  Surgeon: Angel Hernadez M.D.;  Location: SURGERY SAME DAY [facility];  Service: Gynecology    VAGINAL SUSPENSION  10/17/2017    Procedure: VAGINAL SUSPENSION - SACROSPINOUS VAULT;  Surgeon: Angel Hernadez M.D.;  Location: SURGERY SAME DAY [facility];  Service: Gynecology    BLADDER SLING FEMALE  4/11/2017    Procedure: BLADDER SLING FEMALE-TOT;  Surgeon: Angel Hernadez M.D.;  Location: SURGERY SAME DAY [facility];  Service:     ANTERIOR AND POSTERIOR REPAIR  4/11/2017    Procedure: ANTERIOR AND POSTERIOR REPAIR;  Surgeon: Angel Hernadez M.D.;  Location: SURGERY SAME DAY [facility];  Service:     ENTEROCELE REPAIR  4/11/2017    Procedure: ENTEROCELE REPAIR- PERINEOPLASTY;  Surgeon: Angel Hernadez M.D.;  Location: SURGERY SAME DAY [facility];  Service:     VAGINAL SUSPENSION  4/11/2017    Procedure: VAGINAL SUSPENSION-SACROSPINOUS VAULT;  Surgeon: Angel Hernadez M.D.;  Location: SURGERY SAME DAY [facility];  Service:     BLADDER SLING FEMALE  10/25/2016    Procedure: BLADDER SLING FEMALE TOT;  Surgeon: Angel Hernadez M.D.;  Location: SURGERY SAME DAY [facility];  Service:     VAGINAL HYSTERECTOMY SCOPE TOTAL  10/25/2016    Procedure: VAGINAL HYSTERECTOMY SCOPE TOTAL;  Surgeon: Angel Hernadez M.D.;  Location: SURGERY SAME DAY [facility];  Service:     SALPINGECTOMY Bilateral 10/25/2016    Procedure: SALPINGECTOMY;  Surgeon: Angel Hernadez M.D.;  Location: SURGERY SAME DAY [facility];  Service:     ANTERIOR AND POSTERIOR REPAIR  10/25/2016     Procedure: ANTERIOR AND POSTERIOR REPAIR;  Surgeon: Angel Hernadez M.D.;  Location: SURGERY SAME DAY [facility];  Service:     ENTEROCELE REPAIR  10/25/2016    Procedure: ENTEROCELE REPAIR, PERINEOPLASTY;  Surgeon: Angel Hernadez M.D.;  Location: SURGERY SAME DAY [facility];  Service:     VAGINAL SUSPENSION  10/25/2016    Procedure: VAGINAL SUSPENSION SACROSPINOUS VAULT ;  Surgeon: Angel Hernadez M.D.;  Location: SURGERY SAME DAY [facility];  Service:     OTHER      Tubal ligation     Medications:has a current medication list which includes the following prescription(s): ibuprofen, hydrochlorothiazide, trulicity, multiple vitamins-minerals, estradiol, jardiance, metoprolol sr, losartan, atorvastatin, insulin glargine, and aspirin ec.  Allergies:Latex and Nitrofurantoin  Family history:  Family History   Problem Relation Age of Onset    Diabetes Mother         pills    Hypertension Mother     Diabetes Father         insulin    Diabetes Paternal Grandmother     Diabetes Paternal Aunt      Social history: reports that she has never smoked. She has never been exposed to tobacco smoke. She has never used smokeless tobacco. She reports that she does not currently use alcohol. She reports that she does not use drugs.    Review of systems: A full review of systems was performed, and negative with the exception of want is noted above in the HPI.        Objective        BP (!) 147/77 (BP Location: Left arm, Patient Position: Sitting, BP Cuff Size: Adult)   Pulse 69   LMP 01/12/2010     Physical Exam  Vitals reviewed. Exam conducted with a chaperone present (MA - see notes.).   Constitutional:       Appearance: Normal appearance.   HENT:      Head: Normocephalic.      Mouth/Throat:      Mouth: Mucous membranes are moist.   Cardiovascular:      Rate and Rhythm: Normal rate.   Pulmonary:      Effort: Pulmonary effort is normal.   Abdominal:      Palpations: Abdomen is soft. There is no mass.      Tenderness: There  is no abdominal tenderness.   Skin:     General: Skin is warm and dry.   Neurological:      Mental Status: She is alert.   Psychiatric:         Mood and Affect: Mood normal.         Genitourinary:    Vulva: WNL   Bulbocavernosus reflex: Intact   Anal wink reflex: Intact   Perineal sensation: WNL   Urethra: Hypermobile, positive cough stress test.  Some protrusion of the urethra, no overt prolapse vagina: Atrophic -vaginal band improved, Vicryl sutures still healing   Atrophy: Moderate   Cough stress test: Positive    Pelvic floor:    POP-Q: Repeat today Aa -1 / Ba -1 / TVL 9 / C -6 / D x / Ap -1 / Bp -1 / GH 4.5 / PB 2   Bladder/ suprapubic tenderness: Yes   Levator tenderness: Bilateral   Levator muscle tone: Hypertonic -->  persistent   Pelvic floor contraction strength (modified Oxford scale): 2=Weak   Pelvic floor contraction duration: Brief    Bimanual exam: no masses    Procedure Performed: No    Diagnostic test and records review:    PVR today: 38 mL    Labs: n/a    Radiology: n/a    Procedural:     Urodynamics 2/2024  Filling phase: Just reached normal capacity with some difficulty,  some uninhibited detrusor contractions without leak, borderline MUCP. Stress leak at both half and full capacity  Voiding phase: Complete emptying via detrusor contraction       Documentation reviewed: Prior EMR Records, op reports (see HPI)           Assessment & Plan     Tanya Johnsonrez is a 49 y.o. year old P5 with severe mixed urinary incontinence/OAB after 4 prior TOT sling procedures, chronic pelvic pain/dyspareunia, mild cystocele, now post op s/p mesh removal and revision with bulking, recurrent UI      1. Urinary incontinence, mixed, OAB, nocturia  2. Incontinence without sensory awareness  3. History of suburethral sling procedure x4 (toMUSx3, rMUSx1)  -She was initially doing well, however unfortunately her urinary incontinence symptoms have returned, mostly stress incontinence but also with urgency.  He  had a very specific and detailed counseling about her prior surgeries and our next steps.  She has had 4 slings placed previously, which she is counseled is very unusual, as these were all transobturator sling's, and data supports any repeat sling procedure be performed in a different manner likely from a retropubic approach.  All of her transobturator sling synapsin removed and she has persistent stress incontinence.  She is counseled on other approaches for stress incontinence which are all procedural nature including urethral bulking, retropubic sling, fascial sling, Han.  She does not want to temporary management such as a pessary.  Given the severity of her incontinence, and lack of discrete mesh complications such as an exposure (mesh was removed from overtensioning), I still think she would be an ideal candidate for a retropubic mid urethral sling as the next step.  A fascial sling would likely be high risk without better outcomes, han would not be successful.  She has opted for a retropubic sling procedure, and reports that she understands all risks and benefits of this.    After detailed counseling about all prolapse treatment options and shared decision-making, she opts for surgical treatment via  retropubic mid urethral sling, possible anterior repair, and all indicated procedures.  She has reviewed all preoperative written materials and expressed understanding about the procedure, risks, benefits, and recovery.    Benefits of surgery were reviewed, including functional outcomes (bladder/bowel/sexual). Risks of surgery were  also discussed including anesthesia, bleeding, infection, damage to surrounding organs (bladder, ureter, urethra, bowel, blood vessel, nerves), possible blood transfusion (rare), persistent/recurrent incontinence, mesh exposure/erosion, pain, dyspareunia, transient voiding dysfunction requiring catheterization, possible need for sling release/revision, new/worsening urinary  incontinence.  She is recounseled again on the importance of glycemic control in the perioperative period, and she needs to work as hard as possible to keep her blood sugars less than 180 mg/dL to minimize the chance of infection or mesh complications.  Understand she may be at higher risk for these issues due to her multiple prior surgeries    Specifically she was counselled as to what to expect on the day of surgery in the holding area, counseled that medical students may be involved in her care. She will likely go home on the same day as the surgery. She was counseled on what to expect in the recovery room including voiding trial, as well as what to expect if voiding trial is unsuccessful, which would be transient catheterization.   Discussed trajectory of recovery, including maximizing NSAIDs and Tylenol, and that narcotics are not routinely given.  Discussed restrictions including heavy lifting that requires straining, driving while on narcotics, nothing in the vagina and no bathing/swimming for at least 6 weeks until evaluated in the office.  Return to work discussed.  *Please see the corresponding after visit summary counseling packet for detailed counseling provided for the patient.   Pre-op meds: acetaminophen 1000mg PO, phenazopyridine 200mg PO, Cefazolin 2gm IV   Post-op medication plan: ibuprofen, tylenol   Home medication review: She should additionally hold all NSAIDs and supplements for 1 week prior to surgery.  Stop aspirin 1 week prior to surgery.       4. Cystocele, midline  I repeated this counseling today: Stage II cystocele, although most of the protruding tissue is from the periurethral tissues.  I counseled that while she has some prolapse, it is overall minimal, and due to her significant pelvic floor hypertonicity, dyspareunia, and tenderness after her last surgeries, I am hesitant to perform any extensive prolapse repair at this time.  However, since removing for with a sling, a an anterior  plication can be performed to minimize her cystocele, which she agrees to.  I do not recommend sacrospinous suspension or posterior repair at this time.    5. Dyspareunia in female  - s/p vaginal band revision   -Continue significant hypertonic pelvic floor musculature including puborectalis muscles with tenderness.  Again, she is counseled that this is a physical therapy issue rather than a surgical issue, and strongly reinforced following up with PT.  -She now has an appointment with pelvic physical therapy, although they are booked out to July.  I recommended she make this appointment and follow-up postoperatively to address her neuromuscular issues.    6. Vaginal atrophy  Continue vaginal estrogen twice weekly, pause for 2 weeks postop then resume to help tissue healing    7. Chronic constipation  Not addressed today. This is a longstanding issue.  Due to her constipation I do not recommend trial of anticholinergics before her bladder testing, as they may worsen her bowel function      8. Ovarian cyst  Follow-up ultrasound likely functional cyst, continue observation          Torie Maurice MD, FACOG  Urogynecology and Reconstructive Pelvic Surgery  Department of Obstetrics and Gynecology  [facility]        This medical record contains text that has been entered with the assistance of computer voice recognition and dictation software.  Therefore, it may contain unintended errors in text, spelling, punctuation, or grammar

## 2025-01-14 ENCOUNTER — GYNECOLOGY VISIT (OUTPATIENT)
Dept: GYNECOLOGY | Facility: CLINIC | Age: 50
End: 2025-01-14
Payer: MEDICAID

## 2025-01-14 VITALS — HEART RATE: 69 BPM | DIASTOLIC BLOOD PRESSURE: 77 MMHG | SYSTOLIC BLOOD PRESSURE: 147 MMHG

## 2025-01-14 DIAGNOSIS — N39.3 STRESS INCONTINENCE: ICD-10-CM

## 2025-01-14 DIAGNOSIS — N81.11 CYSTOCELE, MIDLINE: ICD-10-CM

## 2025-01-14 DIAGNOSIS — N95.8 GENITOURINARY SYNDROME OF MENOPAUSE: ICD-10-CM

## 2025-01-14 PROCEDURE — 3078F DIAST BP <80 MM HG: CPT | Performed by: STUDENT IN AN ORGANIZED HEALTH CARE EDUCATION/TRAINING PROGRAM

## 2025-01-14 PROCEDURE — 99214 OFFICE O/P EST MOD 30 MIN: CPT | Performed by: STUDENT IN AN ORGANIZED HEALTH CARE EDUCATION/TRAINING PROGRAM

## 2025-01-14 PROCEDURE — 3077F SYST BP >= 140 MM HG: CPT | Performed by: STUDENT IN AN ORGANIZED HEALTH CARE EDUCATION/TRAINING PROGRAM

## 2025-01-14 NOTE — PROGRESS NOTES
PT here for Pre Op  Surgery with  on 02/13/2025 @ 3:00 pm    PT aware to arrive 2 hours prior  Good # [phone]   Pharmacy Verified

## 2025-01-16 ENCOUNTER — APPOINTMENT (OUTPATIENT)
Dept: ADMISSIONS | Facility: MEDICAL CENTER | Age: 50
End: 2025-01-16
Attending: STUDENT IN AN ORGANIZED HEALTH CARE EDUCATION/TRAINING PROGRAM
Payer: MEDICAID

## 2025-01-22 ENCOUNTER — PRE-ADMISSION TESTING (OUTPATIENT)
Dept: ADMISSIONS | Facility: MEDICAL CENTER | Age: 50
End: 2025-01-22
Attending: STUDENT IN AN ORGANIZED HEALTH CARE EDUCATION/TRAINING PROGRAM
Payer: MEDICAID

## 2025-01-22 RX ORDER — NICOTINE POLACRILEX 4 MG/1
GUM, CHEWING ORAL DAILY
COMMUNITY

## 2025-01-22 NOTE — PREADMIT AVS NOTE
Current Medications   Medication Instructions    omeprazole (PRILOSEC) 20 MG Tablet Delayed Response delayed-release tablet Esta jani seguir tomando jazmine medicamento según lo recetado. (It is ok to continue this medication as prescribed.)     FLUTICASONE PROPIONATE, NASAL, NA Esta jani seguir tomando jazmine medicamento según lo recetado. (It is ok to continue this medication as prescribed.)     ASHWAGANDHA PO Deje de víctor esto 7 días antes de la cirugía. (Stop taking this 7 days prior to surgery.)    hydroCHLOROthiazide 12.5 MG tablet No marina esta medicamento el la manana del procedimiento, do not take this medication morning of surgery    TRULICITY 0.75 MG/0.5ML Solution Pen-injector Asegúrese de coordinar las instrucciones para jazmine medicamento con el médico que le receta jazmine medicamento. (Coordinate medication instructions with the prescribing physician.)    Multiple Vitamins-Minerals (WOMENS 50+ MULTI VITAMIN PO) Deje de víctor esto 7 días antes de la cirugía. (Stop taking this 7 days prior to surgery.)    estradiol (ESTRACE VAGINAL) 0.1 MG/GM vaginal cream No usa esta medicamento en la manana de sutton procedimiento.  Do not use this medication morning of your procedure    Empagliflozin (JARDIANCE) 25 MG Tab No marina esta medicamento para 4 cole antes del procedimiento.  Do not take this medication for 4 days prior to your procedure    metoprolol SR (TOPROL XL) 50 MG TABLET SR 24 HR Esta jani seguir tomando jazmine medicamento según lo recetado. (It is ok to continue this medication as prescribed.)     losartan (COZAAR) 100 MG Tab Esta jani seguir tomando jazmine medicamento, henry asegúrese de no víctor jazmine medicamento 24 horas antes de sutton cirugía. (It is ok to continue this medication but do not take this medication 24 hours prior to surgery.)    atorvastatin (LIPITOR) 40 MG Tab Esta jani seguir tomando jazmine medicamento según lo recetado. (It is ok to continue this medication as prescribed.)     Insulin Glargine (BASAGLAR  KWIKPEN) 100 UNIT/ML Solution Pen-injector Asegúrese de coordinar las instrucciones para jazmine medicamento con el médico que le receta jazmine medicamento. (Coordinate medication instructions with the prescribing physician.)    aspirin EC (ECOTRIN) 81 MG Tablet Delayed Response Asegúrese de coordinar las instrucciones para jazmine medicamento con el médico que le receta jazmine medicamento. (Coordinate medication instructions with the prescribing physician.)

## 2025-02-03 ENCOUNTER — PRE-ADMISSION TESTING (OUTPATIENT)
Dept: ADMISSIONS | Facility: MEDICAL CENTER | Age: 50
End: 2025-02-03
Attending: STUDENT IN AN ORGANIZED HEALTH CARE EDUCATION/TRAINING PROGRAM
Payer: MEDICAID

## 2025-02-03 DIAGNOSIS — Z01.812 PRE-OPERATIVE LABORATORY EXAMINATION: ICD-10-CM

## 2025-02-03 DIAGNOSIS — Z01.810 PRE-OPERATIVE CARDIOVASCULAR EXAMINATION: ICD-10-CM

## 2025-02-03 LAB
ANION GAP SERPL CALC-SCNC: 10 MMOL/L (ref 7–16)
BUN SERPL-MCNC: 18 MG/DL (ref 8–22)
CALCIUM SERPL-MCNC: 8.9 MG/DL (ref 8.5–10.5)
CHLORIDE SERPL-SCNC: 104 MMOL/L (ref 96–112)
CO2 SERPL-SCNC: 26 MMOL/L (ref 20–33)
CREAT SERPL-MCNC: 0.71 MG/DL (ref 0.5–1.4)
EKG IMPRESSION: NORMAL
ERYTHROCYTE [DISTWIDTH] IN BLOOD BY AUTOMATED COUNT: 43.8 FL (ref 35.9–50)
EST. AVERAGE GLUCOSE BLD GHB EST-MCNC: 200 MG/DL
GFR SERPLBLD CREATININE-BSD FMLA CKD-EPI: 103 ML/MIN/1.73 M 2
GLUCOSE SERPL-MCNC: 249 MG/DL (ref 65–99)
HBA1C MFR BLD: 8.6 % (ref 4–5.6)
HCT VFR BLD AUTO: 41.5 % (ref 37–47)
HGB BLD-MCNC: 13.3 G/DL (ref 12–16)
MCH RBC QN AUTO: 27.9 PG (ref 27–33)
MCHC RBC AUTO-ENTMCNC: 32 G/DL (ref 32.2–35.5)
MCV RBC AUTO: 87.2 FL (ref 81.4–97.8)
PLATELET # BLD AUTO: 221 K/UL (ref 164–446)
PMV BLD AUTO: 11.4 FL (ref 9–12.9)
POTASSIUM SERPL-SCNC: 3.6 MMOL/L (ref 3.6–5.5)
RBC # BLD AUTO: 4.76 M/UL (ref 4.2–5.4)
SODIUM SERPL-SCNC: 140 MMOL/L (ref 135–145)
WBC # BLD AUTO: 6.9 K/UL (ref 4.8–10.8)

## 2025-02-03 PROCEDURE — 36415 COLL VENOUS BLD VENIPUNCTURE: CPT

## 2025-02-03 PROCEDURE — 93005 ELECTROCARDIOGRAM TRACING: CPT | Mod: TC

## 2025-02-03 PROCEDURE — 80048 BASIC METABOLIC PNL TOTAL CA: CPT

## 2025-02-03 PROCEDURE — 83036 HEMOGLOBIN GLYCOSYLATED A1C: CPT

## 2025-02-03 PROCEDURE — 85027 COMPLETE CBC AUTOMATED: CPT

## 2025-02-03 PROCEDURE — 93010 ELECTROCARDIOGRAM REPORT: CPT | Performed by: INTERNAL MEDICINE

## 2025-02-05 ENCOUNTER — NON-PROVIDER VISIT (OUTPATIENT)
Dept: URGENT CARE | Facility: PHYSICIAN GROUP | Age: 50
End: 2025-02-05

## 2025-02-05 DIAGNOSIS — Z11.1 PPD SCREENING TEST: Primary | ICD-10-CM

## 2025-02-05 PROCEDURE — 86580 TB INTRADERMAL TEST: CPT

## 2025-02-05 NOTE — PROGRESS NOTES
Tanya Augustin Alek is a 49 y.o. female here for a non-provider visit for PPD placement -- Step 1 of 1    Reason for PPD:  work requirement    1. TB evaluation questionnaire completed by patient? Yes      -  If any answers marked yes did you contact a provider prior to placing? No  2.  Patient notified to return to clinic for reading on: 2/7/2025  3.  PPD Placement documentation completed on TB evaluation questionnaire? Yes  4.  Location of TB evaluation questionnaire filed: [facility]

## 2025-02-07 ENCOUNTER — NON-PROVIDER VISIT (OUTPATIENT)
Dept: URGENT CARE | Facility: PHYSICIAN GROUP | Age: 50
End: 2025-02-07

## 2025-02-07 ENCOUNTER — HOSPITAL ENCOUNTER (OUTPATIENT)
Dept: RADIOLOGY | Facility: MEDICAL CENTER | Age: 50
End: 2025-02-07
Attending: PHYSICIAN ASSISTANT
Payer: MEDICAID

## 2025-02-07 DIAGNOSIS — R76.11 POSITIVE PPD: ICD-10-CM

## 2025-02-07 LAB — TB WHEAL 3D P 5 TU DIAM: NORMAL MM

## 2025-02-07 PROCEDURE — 71045 X-RAY EXAM CHEST 1 VIEW: CPT

## 2025-02-07 NOTE — PROGRESS NOTES
Attempted to reminded patient of scheduled procedure on 1/9.  Instructions given to machine.     Tanya Augustin Gaston is a 49 y.o. female here for a non-provider visit for PPD reading -- Step 1 of 1.      1.  Resulted in Epic under enter/edit results? Yes   2.  TB evaluation questionnaire scanned into chart and original given to patient?Yes      3. Was induration greater than 0 mm? Yes, verified by Provider: Pete Gay P.A.-C.    Routed to PCP? No

## 2025-02-13 ENCOUNTER — ANESTHESIA EVENT (OUTPATIENT)
Dept: SURGERY | Facility: MEDICAL CENTER | Age: 50
End: 2025-02-13
Payer: MEDICAID

## 2025-02-13 ENCOUNTER — ANESTHESIA (OUTPATIENT)
Dept: SURGERY | Facility: MEDICAL CENTER | Age: 50
End: 2025-02-13
Payer: MEDICAID

## 2025-02-13 ENCOUNTER — HOSPITAL ENCOUNTER (OUTPATIENT)
Facility: MEDICAL CENTER | Age: 50
End: 2025-02-13
Attending: STUDENT IN AN ORGANIZED HEALTH CARE EDUCATION/TRAINING PROGRAM | Admitting: STUDENT IN AN ORGANIZED HEALTH CARE EDUCATION/TRAINING PROGRAM
Payer: MEDICAID

## 2025-02-13 VITALS
HEART RATE: 54 BPM | DIASTOLIC BLOOD PRESSURE: 85 MMHG | TEMPERATURE: 97.3 F | OXYGEN SATURATION: 97 % | HEIGHT: 64 IN | BODY MASS INDEX: 34.85 KG/M2 | RESPIRATION RATE: 16 BRPM | SYSTOLIC BLOOD PRESSURE: 171 MMHG | WEIGHT: 204.15 LBS

## 2025-02-13 DIAGNOSIS — G89.18 POST-OP PAIN: ICD-10-CM

## 2025-02-13 LAB
GLUCOSE BLD STRIP.AUTO-MCNC: 67 MG/DL (ref 65–99)
GLUCOSE BLD STRIP.AUTO-MCNC: 99 MG/DL (ref 65–99)

## 2025-02-13 PROCEDURE — 700111 HCHG RX REV CODE 636 W/ 250 OVERRIDE (IP): Mod: UD | Performed by: STUDENT IN AN ORGANIZED HEALTH CARE EDUCATION/TRAINING PROGRAM

## 2025-02-13 PROCEDURE — C1771 REP DEV, URINARY, W/SLING: HCPCS | Performed by: STUDENT IN AN ORGANIZED HEALTH CARE EDUCATION/TRAINING PROGRAM

## 2025-02-13 PROCEDURE — 160035 HCHG PACU - 1ST 60 MINS PHASE I: Performed by: STUDENT IN AN ORGANIZED HEALTH CARE EDUCATION/TRAINING PROGRAM

## 2025-02-13 PROCEDURE — 160046 HCHG PACU - 1ST 60 MINS PHASE II: Performed by: STUDENT IN AN ORGANIZED HEALTH CARE EDUCATION/TRAINING PROGRAM

## 2025-02-13 PROCEDURE — 160025 RECOVERY II MINUTES (STATS): Performed by: STUDENT IN AN ORGANIZED HEALTH CARE EDUCATION/TRAINING PROGRAM

## 2025-02-13 PROCEDURE — 700105 HCHG RX REV CODE 258: Mod: UD | Performed by: STUDENT IN AN ORGANIZED HEALTH CARE EDUCATION/TRAINING PROGRAM

## 2025-02-13 PROCEDURE — 57288 REPAIR BLADDER DEFECT: CPT | Mod: AS | Performed by: STUDENT IN AN ORGANIZED HEALTH CARE EDUCATION/TRAINING PROGRAM

## 2025-02-13 PROCEDURE — 700105 HCHG RX REV CODE 258: Mod: UD | Performed by: ANESTHESIOLOGY

## 2025-02-13 PROCEDURE — A9270 NON-COVERED ITEM OR SERVICE: HCPCS | Mod: UD | Performed by: STUDENT IN AN ORGANIZED HEALTH CARE EDUCATION/TRAINING PROGRAM

## 2025-02-13 PROCEDURE — 700101 HCHG RX REV CODE 250: Mod: UD | Performed by: ANESTHESIOLOGY

## 2025-02-13 PROCEDURE — 160009 HCHG ANES TIME/MIN: Performed by: STUDENT IN AN ORGANIZED HEALTH CARE EDUCATION/TRAINING PROGRAM

## 2025-02-13 PROCEDURE — 700101 HCHG RX REV CODE 250: Mod: UD | Performed by: STUDENT IN AN ORGANIZED HEALTH CARE EDUCATION/TRAINING PROGRAM

## 2025-02-13 PROCEDURE — 700111 HCHG RX REV CODE 636 W/ 250 OVERRIDE (IP): Mod: UD | Performed by: ANESTHESIOLOGY

## 2025-02-13 PROCEDURE — 160029 HCHG SURGERY MINUTES - 1ST 30 MINS LEVEL 4: Performed by: STUDENT IN AN ORGANIZED HEALTH CARE EDUCATION/TRAINING PROGRAM

## 2025-02-13 PROCEDURE — 57240 ANTERIOR COLPORRHAPHY: CPT | Mod: AS | Performed by: STUDENT IN AN ORGANIZED HEALTH CARE EDUCATION/TRAINING PROGRAM

## 2025-02-13 PROCEDURE — RXMED WILLOW AMBULATORY MEDICATION CHARGE: Performed by: STUDENT IN AN ORGANIZED HEALTH CARE EDUCATION/TRAINING PROGRAM

## 2025-02-13 PROCEDURE — 700102 HCHG RX REV CODE 250 W/ 637 OVERRIDE(OP): Mod: UD | Performed by: ANESTHESIOLOGY

## 2025-02-13 PROCEDURE — 160002 HCHG RECOVERY MINUTES (STAT): Performed by: STUDENT IN AN ORGANIZED HEALTH CARE EDUCATION/TRAINING PROGRAM

## 2025-02-13 PROCEDURE — 110371 HCHG SHELL REV 272: Performed by: STUDENT IN AN ORGANIZED HEALTH CARE EDUCATION/TRAINING PROGRAM

## 2025-02-13 PROCEDURE — 57288 REPAIR BLADDER DEFECT: CPT | Performed by: STUDENT IN AN ORGANIZED HEALTH CARE EDUCATION/TRAINING PROGRAM

## 2025-02-13 PROCEDURE — 88300 SURGICAL PATH GROSS: CPT | Performed by: PATHOLOGY

## 2025-02-13 PROCEDURE — 82962 GLUCOSE BLOOD TEST: CPT

## 2025-02-13 PROCEDURE — A9270 NON-COVERED ITEM OR SERVICE: HCPCS | Mod: UD | Performed by: ANESTHESIOLOGY

## 2025-02-13 PROCEDURE — 160048 HCHG OR STATISTICAL LEVEL 1-5: Performed by: STUDENT IN AN ORGANIZED HEALTH CARE EDUCATION/TRAINING PROGRAM

## 2025-02-13 PROCEDURE — 700102 HCHG RX REV CODE 250 W/ 637 OVERRIDE(OP): Mod: UD | Performed by: STUDENT IN AN ORGANIZED HEALTH CARE EDUCATION/TRAINING PROGRAM

## 2025-02-13 PROCEDURE — 160015 HCHG STAT PREOP MINUTES: Performed by: STUDENT IN AN ORGANIZED HEALTH CARE EDUCATION/TRAINING PROGRAM

## 2025-02-13 PROCEDURE — 57240 ANTERIOR COLPORRHAPHY: CPT | Performed by: STUDENT IN AN ORGANIZED HEALTH CARE EDUCATION/TRAINING PROGRAM

## 2025-02-13 PROCEDURE — 88300 SURGICAL PATH GROSS: CPT | Mod: 26 | Performed by: PATHOLOGY

## 2025-02-13 PROCEDURE — 160047 HCHG PACU  - EA ADDL 30 MINS PHASE II: Performed by: STUDENT IN AN ORGANIZED HEALTH CARE EDUCATION/TRAINING PROGRAM

## 2025-02-13 PROCEDURE — 160041 HCHG SURGERY MINUTES - EA ADDL 1 MIN LEVEL 4: Performed by: STUDENT IN AN ORGANIZED HEALTH CARE EDUCATION/TRAINING PROGRAM

## 2025-02-13 DEVICE — SYSTEM TRANSVAGINAL MID URETHRAL SLING ADVANTAGE FIT BLUE (1EA): Type: IMPLANTABLE DEVICE | Site: VAGINA | Status: FUNCTIONAL

## 2025-02-13 RX ORDER — DIPHENHYDRAMINE HYDROCHLORIDE 50 MG/ML
12.5 INJECTION INTRAMUSCULAR; INTRAVENOUS
Status: DISCONTINUED | OUTPATIENT
Start: 2025-02-13 | End: 2025-02-13 | Stop reason: HOSPADM

## 2025-02-13 RX ORDER — GENTAMICIN 40 MG/ML
INJECTION, SOLUTION INTRAMUSCULAR; INTRAVENOUS
Status: DISCONTINUED | OUTPATIENT
Start: 2025-02-13 | End: 2025-02-13 | Stop reason: HOSPADM

## 2025-02-13 RX ORDER — EPHEDRINE SULFATE 50 MG/ML
5 INJECTION, SOLUTION INTRAVENOUS
Status: DISCONTINUED | OUTPATIENT
Start: 2025-02-13 | End: 2025-02-13 | Stop reason: HOSPADM

## 2025-02-13 RX ORDER — SODIUM CHLORIDE, SODIUM LACTATE, POTASSIUM CHLORIDE, CALCIUM CHLORIDE 600; 310; 30; 20 MG/100ML; MG/100ML; MG/100ML; MG/100ML
INJECTION, SOLUTION INTRAVENOUS CONTINUOUS
Status: ACTIVE | OUTPATIENT
Start: 2025-02-13 | End: 2025-02-13

## 2025-02-13 RX ORDER — HYDROMORPHONE HYDROCHLORIDE 1 MG/ML
0.2 INJECTION, SOLUTION INTRAMUSCULAR; INTRAVENOUS; SUBCUTANEOUS
Status: DISCONTINUED | OUTPATIENT
Start: 2025-02-13 | End: 2025-02-13 | Stop reason: HOSPADM

## 2025-02-13 RX ORDER — MEPERIDINE HYDROCHLORIDE 25 MG/ML
12.5 INJECTION INTRAMUSCULAR; INTRAVENOUS; SUBCUTANEOUS
Status: DISCONTINUED | OUTPATIENT
Start: 2025-02-13 | End: 2025-02-13 | Stop reason: HOSPADM

## 2025-02-13 RX ORDER — LABETALOL HYDROCHLORIDE 5 MG/ML
5 INJECTION, SOLUTION INTRAVENOUS
Status: DISCONTINUED | OUTPATIENT
Start: 2025-02-13 | End: 2025-02-13 | Stop reason: HOSPADM

## 2025-02-13 RX ORDER — OXYCODONE HCL 5 MG/5 ML
5 SOLUTION, ORAL ORAL
Status: COMPLETED | OUTPATIENT
Start: 2025-02-13 | End: 2025-02-13

## 2025-02-13 RX ORDER — PHENAZOPYRIDINE HYDROCHLORIDE 200 MG/1
200 TABLET, FILM COATED ORAL
Status: COMPLETED | OUTPATIENT
Start: 2025-02-13 | End: 2025-02-13

## 2025-02-13 RX ORDER — HYDRALAZINE HYDROCHLORIDE 20 MG/ML
5 INJECTION INTRAMUSCULAR; INTRAVENOUS
Status: DISCONTINUED | OUTPATIENT
Start: 2025-02-13 | End: 2025-02-13 | Stop reason: HOSPADM

## 2025-02-13 RX ORDER — MIDAZOLAM HYDROCHLORIDE 1 MG/ML
INJECTION INTRAMUSCULAR; INTRAVENOUS PRN
Status: DISCONTINUED | OUTPATIENT
Start: 2025-02-13 | End: 2025-02-13 | Stop reason: SURG

## 2025-02-13 RX ORDER — IBUPROFEN 400 MG/1
TABLET, FILM COATED ORAL
Qty: 60 TABLET | Refills: 1 | Status: SHIPPED | OUTPATIENT
Start: 2025-02-13

## 2025-02-13 RX ORDER — HALOPERIDOL 5 MG/ML
1 INJECTION INTRAMUSCULAR
Status: DISCONTINUED | OUTPATIENT
Start: 2025-02-13 | End: 2025-02-13 | Stop reason: HOSPADM

## 2025-02-13 RX ORDER — HYDROMORPHONE HYDROCHLORIDE 1 MG/ML
0.4 INJECTION, SOLUTION INTRAMUSCULAR; INTRAVENOUS; SUBCUTANEOUS
Status: DISCONTINUED | OUTPATIENT
Start: 2025-02-13 | End: 2025-02-13 | Stop reason: HOSPADM

## 2025-02-13 RX ORDER — IPRATROPIUM BROMIDE AND ALBUTEROL SULFATE 2.5; .5 MG/3ML; MG/3ML
3 SOLUTION RESPIRATORY (INHALATION)
Status: DISCONTINUED | OUTPATIENT
Start: 2025-02-13 | End: 2025-02-13 | Stop reason: HOSPADM

## 2025-02-13 RX ORDER — SCOPOLAMINE 1 MG/3D
1 PATCH, EXTENDED RELEASE TRANSDERMAL
Status: DISCONTINUED | OUTPATIENT
Start: 2025-02-13 | End: 2025-02-13 | Stop reason: HOSPADM

## 2025-02-13 RX ORDER — GENTAMICIN 40 MG/ML
INJECTION, SOLUTION INTRAMUSCULAR; INTRAVENOUS
Status: DISCONTINUED
Start: 2025-02-13 | End: 2025-02-13 | Stop reason: HOSPADM

## 2025-02-13 RX ORDER — GLYCOPYRROLATE 0.2 MG/ML
INJECTION INTRAMUSCULAR; INTRAVENOUS PRN
Status: DISCONTINUED | OUTPATIENT
Start: 2025-02-13 | End: 2025-02-13 | Stop reason: SURG

## 2025-02-13 RX ORDER — DEXTROSE MONOHYDRATE AND SODIUM CHLORIDE 5; .45 G/100ML; G/100ML
INJECTION, SOLUTION INTRAVENOUS CONTINUOUS
Status: DISCONTINUED | OUTPATIENT
Start: 2025-02-13 | End: 2025-02-13 | Stop reason: HOSPADM

## 2025-02-13 RX ORDER — HYDROMORPHONE HYDROCHLORIDE 1 MG/ML
0.1 INJECTION, SOLUTION INTRAMUSCULAR; INTRAVENOUS; SUBCUTANEOUS
Status: DISCONTINUED | OUTPATIENT
Start: 2025-02-13 | End: 2025-02-13 | Stop reason: HOSPADM

## 2025-02-13 RX ORDER — METOPROLOL TARTRATE 1 MG/ML
1 INJECTION, SOLUTION INTRAVENOUS
Status: DISCONTINUED | OUTPATIENT
Start: 2025-02-13 | End: 2025-02-13 | Stop reason: HOSPADM

## 2025-02-13 RX ORDER — ACETAMINOPHEN 500 MG
TABLET ORAL
Qty: 60 TABLET | Refills: 1 | Status: SHIPPED | OUTPATIENT
Start: 2025-02-13

## 2025-02-13 RX ORDER — ONDANSETRON 2 MG/ML
4 INJECTION INTRAMUSCULAR; INTRAVENOUS
Status: DISCONTINUED | OUTPATIENT
Start: 2025-02-13 | End: 2025-02-13 | Stop reason: HOSPADM

## 2025-02-13 RX ORDER — DEXAMETHASONE SODIUM PHOSPHATE 4 MG/ML
INJECTION, SOLUTION INTRA-ARTICULAR; INTRALESIONAL; INTRAMUSCULAR; INTRAVENOUS; SOFT TISSUE PRN
Status: DISCONTINUED | OUTPATIENT
Start: 2025-02-13 | End: 2025-02-13 | Stop reason: SURG

## 2025-02-13 RX ORDER — LIDOCAINE HYDROCHLORIDE 20 MG/ML
INJECTION, SOLUTION EPIDURAL; INFILTRATION; INTRACAUDAL; PERINEURAL PRN
Status: DISCONTINUED | OUTPATIENT
Start: 2025-02-13 | End: 2025-02-13 | Stop reason: SURG

## 2025-02-13 RX ORDER — CEFAZOLIN SODIUM 1 G/3ML
INJECTION, POWDER, FOR SOLUTION INTRAMUSCULAR; INTRAVENOUS PRN
Status: DISCONTINUED | OUTPATIENT
Start: 2025-02-13 | End: 2025-02-13 | Stop reason: SURG

## 2025-02-13 RX ORDER — ACETAMINOPHEN 500 MG
1000 TABLET ORAL ONCE
Status: COMPLETED | OUTPATIENT
Start: 2025-02-13 | End: 2025-02-13

## 2025-02-13 RX ORDER — OXYCODONE HCL 5 MG/5 ML
10 SOLUTION, ORAL ORAL
Status: COMPLETED | OUTPATIENT
Start: 2025-02-13 | End: 2025-02-13

## 2025-02-13 RX ADMIN — DEXTROSE AND SODIUM CHLORIDE: 5; 450 INJECTION, SOLUTION INTRAVENOUS at 14:34

## 2025-02-13 RX ADMIN — OXYCODONE HYDROCHLORIDE 10 MG: 5 SOLUTION ORAL at 17:24

## 2025-02-13 RX ADMIN — DEXAMETHASONE SODIUM PHOSPHATE 8 MG: 4 INJECTION INTRA-ARTICULAR; INTRALESIONAL; INTRAMUSCULAR; INTRAVENOUS; SOFT TISSUE at 15:30

## 2025-02-13 RX ADMIN — ACETAMINOPHEN 1000 MG: 500 TABLET ORAL at 13:51

## 2025-02-13 RX ADMIN — FENTANYL CITRATE 100 MCG: 50 INJECTION, SOLUTION INTRAMUSCULAR; INTRAVENOUS at 15:21

## 2025-02-13 RX ADMIN — MIDAZOLAM HYDROCHLORIDE 2 MG: 1 INJECTION, SOLUTION INTRAMUSCULAR; INTRAVENOUS at 15:20

## 2025-02-13 RX ADMIN — GLYCOPYRROLATE 0.2 MG: 0.2 INJECTION INTRAMUSCULAR; INTRAVENOUS at 15:35

## 2025-02-13 RX ADMIN — PROPOFOL 160 MG: 10 INJECTION, EMULSION INTRAVENOUS at 15:23

## 2025-02-13 RX ADMIN — SCOPOLAMINE 1 PATCH: 1.5 PATCH, EXTENDED RELEASE TRANSDERMAL at 13:51

## 2025-02-13 RX ADMIN — SODIUM CHLORIDE, POTASSIUM CHLORIDE, SODIUM LACTATE AND CALCIUM CHLORIDE: 600; 310; 30; 20 INJECTION, SOLUTION INTRAVENOUS at 15:17

## 2025-02-13 RX ADMIN — PHENAZOPYRIDINE 200 MG: 200 TABLET ORAL at 13:51

## 2025-02-13 RX ADMIN — SODIUM CHLORIDE, POTASSIUM CHLORIDE, SODIUM LACTATE AND CALCIUM CHLORIDE: 600; 310; 30; 20 INJECTION, SOLUTION INTRAVENOUS at 14:07

## 2025-02-13 RX ADMIN — CEFAZOLIN 2 G: 1 INJECTION, POWDER, FOR SOLUTION INTRAMUSCULAR; INTRAVENOUS at 15:26

## 2025-02-13 RX ADMIN — LIDOCAINE HYDROCHLORIDE 100 MG: 20 INJECTION, SOLUTION EPIDURAL; INFILTRATION; INTRACAUDAL; PERINEURAL at 15:23

## 2025-02-13 ASSESSMENT — FIBROSIS 4 INDEX: FIB4 SCORE: .7745966692414833771

## 2025-02-13 ASSESSMENT — PAIN SCALES - GENERAL: PAIN_LEVEL: 0

## 2025-02-13 NOTE — OR NURSING
Pt with blood sugar of 67, /82 in preop, Dr Hollis notified, new orders received. D5 1/2 NS started at 150ml/hr per order

## 2025-02-13 NOTE — ANESTHESIA PROCEDURE NOTES
Airway    Date/Time: 2/13/2025 3:25 PM    Performed by: Regis Hollis M.D.  Authorized by: Regis Hollis M.D.    Location:  OR  Urgency:  Elective  Difficult Airway: No    Indications for Airway Management:  Anesthesia      Spontaneous Ventilation: absent    Sedation Level:  Deep  Preoxygenated: Yes    Patient Position:  Sniffing  Mask Difficulty Assessment:  1 - vent by mask  Final Airway Type:  Supraglottic airway  Final Supraglottic Airway:  Standard LMA    Number of Attempts at Approach:  1

## 2025-02-13 NOTE — DISCHARGE INSTRUCTIONS
What to Expect Post Anesthesia    Rest and take it easy for the first 24 hours.  A responsible adult is recommended to remain with you during that time.  It is normal to feel sleepy.  We encourage you to not do anything that requires balance, judgment or coordination.    FOR 24 HOURS DO NOT:  Drive, operate machinery or run household appliances.  Drink beer or alcoholic beverages.  Make important decisions or sign legal documents.    To avoid nausea, slowly advance diet as tolerated, avoiding spicy or greasy foods for the first day.  Add more substantial food to your diet according to your provider's instructions.  Babies can be fed formula or breast milk as soon as they are hungry.  INCREASE FLUIDS AND FIBER TO AVOID CONSTIPATION.    MILD FLU-LIKE SYMPTOMS ARE NORMAL.  YOU MAY EXPERIENCE GENERALIZED MUSCLE ACHES, THROAT IRRITATION, HEADACHE AND/OR SOME NAUSEA.    If any questions arise, call your provider.  If your provider is not available, please feel free to call the Surgical [facility] at [phone].    MEDICATIONS: Resume taking daily medication.  Take prescribed pain medication with food.  If no medication is prescribed, you may take non-aspirin pain medication if needed.  PAIN MEDICATION CAN BE VERY CONSTIPATING.  Take a stool softener or laxative such as senokot, pericolace, or milk of magnesia if needed.    Last pain medication given Tylenol at 1:50pm, may take next dose at 9:50pm.  May take Ibuprofen at anytime.   10mg oxycodone given at 5:20pm          Post-op: What to expect after your surgery    For urgent/emergent post-operative questions or concerns which cannot wait until the next business day, please call  the [facility] on call line at [phone]. You will be directed to the doctor on call.     For less-urgent matters (Monday - Friday), you may send a message through [facility] or call the general Women's Health line at [phone].     Bladder function  Try to empty your bladder (urinate)  at regular intervals by sitting on the toilet and relaxing.  You may need to adjust your positioning (lean forward or back) to empty the bladder fully. It is important that you do not push or strain to empty your bladder.     Call the surgeon at the number above if you cannot urinate. Also, call for treatment if you have signs and symptoms of a urinary tract infection, including:   Burning with urination  Bladder pain  Worsening need to urinate right away  Urine with a bad smell    If you are sent home with a catheter:   Empty the catheter bag when it becomes full. The bag should be kept at a level below your hips to drain properly. When you are asleep, the bag should dangle off the bed. and should dangle off of the bed while you are asleep. You will be called the day after you go home to schedule an office visit to test your bladder and remove the catheter.     Vaginal care:   Do not go swimming, take sitting baths, or have sexual intercourse for 6 weeks after surgery. Do not place anything in the vagina except vaginal estrogen cream, if instructed to do so by your surgeon.     Vaginal discharge and bleeding/spotting is also normal through the entire 6-week recovery. Sometimes small sutures will fall out of the vagina as they dissolve. This is normal. Contact the office with any heavy bleeding soaking through pads, bad-smelling discharge, or worsening pain.     Pain management:  Surgical pain is controlled in most patients with only non-steroidal anti-inflammatory drugs (NSAIDs, such as ibuprofen, “Advil”), and acetaminophen (Tylenol). These drugs can be taken together without interaction.     In the hospital, your nurse will give you these medications at regular intervals to both treat and to prevent pain. If these are not controlling your pain, you may ask the nurse for additional medication.     When you go home, you will also take NSAIDs and acetaminophen for pain management. I recommend the following at-home  pain regimen:    Take ibuprofen 800mg three times per day, along with tylenol 1000mg three times per day, for the first 5-7 days after surgery to prevent and treat pain and inflammation.   After 5-7 days, you may take 400mg ibuprofen and 500mg tylenol as-needed     Sometimes, a short course of narcotics such as oxycodone and hydromorphone is  required, but this is not routine. We do not recommend using narcotics regularly as it can lead to constipation or dizziness, and falls.     Do not drive or operate heavy machinery while using narcotics. You are unlikely to become addicted if you need to take a narcotic medication a few times within the first week of your surgery.  After the first few days to one week, your pain should decrease and you should not have pain severe enough to need narcotics. If you continue having severe pain, contact your surgeon for re-evaluation.     Incision care  The small incisions above your pubic bone or in the groin, will be closed with either small bandages or a surgical glue. You can shower with these in place. In the shower, allow soapy warm water to run over the incisions. Do not scrub or wipe your incisions. Keep the incisions dry for the remainder of the day/night. The bandages will fall off on their own, or you can remove them after at least 3 days if they become discolored or dirty. The glue will also fall off on its own after a few weeks. Call us if there is any increasing incisional pain, redness, warmth or discharge.      Activity restrictions  During the first 6 weeks you should avoid any type of heavy lifting that requires straining.  Gentle walking is good exercise. Start with about 10 minutes a day when you feel ready and build up gradually. Avoid repetitive squatting or bending at the waist. Avoid any fitness-type training, aerobics, etc. for at least 6 weeks after surgery. Generally, you will need a few days off work. This period may be longer if you have a very physical  job or if specifically recommended by your surgeon.     Return to sex  When your surgeon clears you to resume sex (if desired), begin slowly and to use enough lubrication to help ease the discomfort. Because your vagina and pelvis have been re-structured, and it can take time to get used to sex after surgery. As scar tissue softens over time you will feel less discomfort. If discomfort does not go away, contact the office to schedule an exam and to discuss other options. In some cases, your surgeon may prescribe a low dose of vaginal estrogen to help with vaginal dryness and pain that may happen with sex.

## 2025-02-13 NOTE — ANESTHESIA PREPROCEDURE EVALUATION
Case: [id number] Date/Time: 02/13/25 1445    Procedures:       ANTERIOR REPAIR, MID URETHRAL SLING      BLADDER SLING, FEMALE    Pre-op diagnosis: CYSTOCELE MIDLINE, STRESS URINARY INCONTINENCE    Location: [facility] ROOM 21 / SURGERY SAME DAY [facility]    Surgeons: Torie Maurice M.D.            Relevant Problems   ANESTHESIA   (positive) Obstructive sleep apnea      CARDIAC   (positive) HTN (hypertension)      GI   (positive) GERD (gastroesophageal reflux disease)      ENDO   (positive) Type 2 diabetes mellitus without complication, with long-term current use of insulin (HCC)       Physical Exam    Airway   Mallampati: II  TM distance: <3 FB  Neck ROM: full       Cardiovascular - normal exam  Rhythm: regular  Rate: normal  (-) murmur     Dental - normal exam           Pulmonary - normal exam  Breath sounds clear to auscultation     Abdominal   (+) obese     Neurological - normal exam                   Anesthesia Plan    ASA 2       Plan - general       Airway plan will be LMA          Induction: intravenous    Postoperative Plan: Postoperative administration of opioids is intended.    Pertinent diagnostic labs and testing reviewed    Informed Consent:    Anesthetic plan and risks discussed with patient.    Use of blood products discussed with: patient whom consented to blood products.

## 2025-02-13 NOTE — OP REPORT
OPERATIVE REPORT     Name: Tanya Barrow    : 1975    MRN: [id number]       PRE-OP DIAGNOSIS:   Stress urinary incontinence  Cystocele            POST-OP DIAGNOSIS:   Stress urinary incontinence  Cystocele  Anterior vaginal wall mesh               PROCEDURE:   Retropubic midurethral sling (87729)  Anterior repair (08084) with mesh excision            SURGEON:   Torie Maurice MD - Primary  Griselda Slade PAC - Assist              ANESTHESIA: General            FINDINGS:       - Exam under anesthesia: Significant scar tissue throughout the vagina from her prior surgeries.  Stage 2 prolapse, anterior only with minimal apical descent. Bimanual exam without palpable adnexal masses. Mesh noted in anterior vaginal wall during dissection, removed.    - Cystourethroscopy: Performed after completion of relevant procedures. Normal appearing urothelium without lesions, masses, sutures, or perforations. Ureteral orifices noted in the normal orthotopic position, with brisk jets of urine bilaterally. Urethra was normal in appearance without evidence of stricture, perforation, or diverticulum.        ESTIMATED BLOOD LOSS: 10 mL            DRAINS: Dumont                   SPECIMENS:   ID Type Source Tests Collected by Time Destination   A : vaginal mesh Other Other GROSS ONLY REQUEST Torie Maurice M.D. 2025  3:43 PM                IMPLANTS: [place] scientific advantage fit blue retropubic sling            COMPLICATIONS: None            DISPOSITION: Discharge            CONDITION: Stable            INDICATION FOR SURGERY:     Tanya Barrow is a 49 y.o. year old P5 with severe mixed urinary incontinence/OAB after 4 prior TOT sling procedures, chronic pelvic pain/dyspareunia, mild cystocele, now post op s/p mesh removal and revision with bulking, recurrent UI .   All questions were answered and consent was signed and witnessed. She was initially doing well, however unfortunately her urinary  incontinence symptoms have returned, mostly stress incontinence but also with urgency.  He had a very specific and detailed counseling about her prior surgeries and our next steps.  She has had 4 slings placed previously, which she is counseled is very unusual, as these were all transobturator sling's, and data supports any repeat sling procedure be performed in a different manner likely from a retropubic approach.  All of her transobturator sling synapsin removed and she has persistent stress incontinence.  She is counseled on other approaches for stress incontinence which are all procedural nature including urethral bulking, retropubic sling, fascial sling, Han.  She does not want temporary management such as a pessary.  Given the severity of her incontinence, and lack of discrete mesh complications such as an exposure (mesh was removed from overtensioning), I still think she would be an ideal candidate for a retropubic mid urethral sling as the next step.  A fascial sling would likely be high risk without better outcomes, han would not be successful.  She has opted for a retropubic sling procedure, and reports that she understands all risks and benefits of this. After detailed counseling about all prolapse treatment options and shared decision-making, she opts for surgical treatment via  retropubic mid urethral sling, possible anterior repair, and all indicated procedures.  She has reviewed all preoperative written materials and expressed understanding about the procedure, risks, benefits, and recovery. Benefits of surgery were reviewed, including functional outcomes (bladder/bowel/sexual). Risks of surgery were  also discussed including anesthesia, bleeding, infection, damage to surrounding organs (bladder, ureter, urethra, bowel, blood vessel, nerves), possible blood transfusion (rare), persistent/recurrent incontinence, mesh exposure/erosion, pain, dyspareunia, transient voiding dysfunction requiring  catheterization, possible need for sling release/revision, new/worsening urinary incontinence.  She is recounseled again on the importance of glycemic control in the perioperative period, and she needs to work as hard as possible to keep her blood sugars less than 180 mg/dL to minimize the chance of infection or mesh complications.  Understand she may be at higher risk for these issues due to her multiple prior surgeries.  Parkinson alteplase with a  and with her daughter present.  All questions answered and consent was signed and witness.       TECHNIQUE:    I spoke with the patient in the preoperative holding area, where again risks, benefits, indications, and alternatives were reviewed and informed consent was obtained.  She was then transferred to the operative suite, where she was identified, procedure was confirmed.  She was placed in dorsal supine position, given general endotracheal anesthesia without difficulty.  She was then placed in a high dorsal lithotomy position  in yellowfin stirrups with all pressure points padded.  She was prepared and draped in usual sterile fashion with vaginal chlorhexidine prep with all pressure points padded.  An appropriate time-out was performed, where patient was identified, procedure was confirmed, and the operative team was introduced.  It was also confirmed that patient did receive cefazolin 2 g IV for prophylaxis, and she also received DVT prophylaxis with sequential compression devices bilaterally throughout the case.  At this time, exam under anesthesia revealed findings noted above.   A 16-Sinhala Dumont catheter was then placed in the patient's  bladder for drainage throughout the procedure, and a [place] retractor was placed for retraction.     At this time, the anterior repair procedure proceeded in the following fashion:  Allis clamps were placed to delineate the anterior vaginal wall, which was injected superficially with 1 % lidocaine with  dilute epinephrine. The anterior epithelium was incised in the midline sharply, dissecting the epithelium off the underlying pubocervical fibromuscular layer bilaterally without difficulty to the level of the pubic rami.  During dissection, permanent mesh, blue/green in color was encountered within the anterior wall, not consistent with her prior surgeries, but possibly due to migrated sling.  This was carefully removed from the surrounding tissue and passed off for gross pathology.  No further mesh noted.  The pubocervical fascia was then plicated in the midline using interrupted 2-0 stratafix PDS suture in a single layer with excellent reduction in the cystocele.  Excess vaginal mucosa was then trimmed and reapproximated using 2-0 Vicryl in a running locked fashion in a single layer with care to include underlying pubocervical fascia to reduce dead space, with excellent hemostasis noted.     The retropubic mid urethral sling procedure then proceeded in the following fashion: The Dumont catheter was placed under traction and the bladder neck was delineated at the level of the Dumont bulb.  Allis clamps used to delineate the mid urethra which was then injected with a solution of 1% lidocaine with dilute epinephrine.  A scalpel was then used to make an incision vertically in the epithelium over the urethra.  Sharp and blunt dissection was then used to dissect the vaginal epithelium away from the underlying pubocervical fascia to the level of the inferior right pubic ramus with Metzenbaum scissors with just enough space for the mesh to pass and lie flat.  This took extra care in order to circumvent prior scar tissue, and we were able to dissect without making any buttonholed within the surrounding epithelium.  This dissection was then repeated on the contralateral side.  Excellent hemostasis was noted.  Attention then turned the suprapubic area where approximately 1 fingerbreadth above the pubic symphysis and 2 cm  lateral bilaterally, markings were made for the exit sites of the trochars.  The [place] [employer] Advantage retropubic sling trochars were then passed underneath the pubic ramus and up through the anterior abdominal wall bilaterally and held.  Cystourethroscopy was then performed.  The Dumont catheter was removed and the 70 degree cystoscope was advanced into the bladder which was backfilled with sterile saline.  A 360 degree survey was performed which noted normal roll of the trochars behind the bladder wall with no visualized trocar or mesh.  There was also bilateral ureteral efflux of Pyridium stained urine. No urethral perforations or mesh were visualized.  The cystoscope was removed and the bladder drained of all cystoscopic fluid and the Dumont catheter was replaced.  The sling mesh was then tensioned against an 8 hegar dilator and cut at the anterior abdominal wall, with skin incisions covered with Dermabond. It was confirmed here was no undue tension on the urethra. Another exam noted that there was no perforation of mesh into the vaginal epithelium.  The vaginal epithelium was then closed with a 2-0 Vicryl running locked suture followed by 3 interrupted 2-0 Vicryl sutures with excellent hemostasis noted.      All counts were correct.  The patient was then wakened from general anesthesia easily, and brought to the PACU in stable condition. Anticipate discharge home later today.         Torie Maurice MD, FACOG  Urogynecology and Reconstructive Pelvic Surgery  Department of Obstetrics and Gynecology  [facility]

## 2025-02-14 ENCOUNTER — PHARMACY VISIT (OUTPATIENT)
Dept: PHARMACY | Facility: MEDICAL CENTER | Age: 50
End: 2025-02-14
Payer: COMMERCIAL

## 2025-02-14 LAB — PATHOLOGY CONSULT NOTE: NORMAL

## 2025-02-14 NOTE — ANESTHESIA POSTPROCEDURE EVALUATION
Patient: Tanya Barrow    Procedure Summary       Date: 02/13/25 Room / Location: [facility] ROOM 21 / SURGERY SAME DAY [facility]    Anesthesia Start: 1517 Anesthesia Stop: 1628    Procedures:       ANTERIOR REPAIR, MID URETHRAL SLING, MESH EXCISION (Vagina )      BLADDER SLING, FEMALE (Vagina ) Diagnosis: (CYSTOCELE MIDLINE, STRESS URINARY INCONTINENCE)    Surgeons: Torie Maurice M.D. Responsible Provider: Regis Hollis M.D.    Anesthesia Type: general ASA Status: 2            Final Anesthesia Type: general  Last vitals  BP   Blood Pressure: (!) 162/77    Temp   36.3 °C (97.3 °F)    Pulse   81   Resp   20    SpO2   99 %      Anesthesia Post Evaluation    Patient location during evaluation: PACU  Patient participation: complete - patient participated  Level of consciousness: awake and alert  Pain score: 0    Airway patency: patent  Anesthetic complications: no  Cardiovascular status: hemodynamically stable  Respiratory status: acceptable  Hydration status: euvolemic    PONV: none          There were no known notable events for this encounter.     Nurse Pain Score: 0 (NPRS)

## 2025-02-14 NOTE — ANESTHESIA TIME REPORT
Anesthesia Start and Stop Event Times       Date Time Event    2/13/2025 1510 Ready for Procedure     1517 Anesthesia Start     1628 Anesthesia Stop          Responsible Staff  02/13/25      Name Role Begin End    Regis Hollis M.D. Anesth 1517 1628          Overtime Reason:  no overtime (within assigned shift)    Comments:

## 2025-02-14 NOTE — OR NURSING
1623- pt arrives from OR to PACU 1, report received from RN and anesthesia. Pt place on monitor. VSS,  NAD noted. O2 6L via mask.    Sagastume draining clear orange urine.    Pelvic lap sites x 2 open to air, dermabond noted.   FSBS 99    1700- pt sitting up, tolerating clears.  Denies other needs at this time.      1715-Dr Maurice at bedside.  Daughter brought to bedside.     1725-medicated per MAR for increasing pain 7/10    1745-1400cc emptied from sagastume then back filled with 300cc NS and foely d/c'd    1800-pt able to void 400cc, dresses self independently.    1820-discharge instructions given to pt and daughter in Polish by JAZMIN Montana  verbalize understanding    1825-PIV d/c'd and pt escorted out in w/c by RN, all belongings accounted for.

## 2025-03-26 NOTE — PROGRESS NOTES
Urogynecology & Reconstructive Pelvic Surgery - Follow Up    Tanya Barrow MRN:[id number] :1975    Referred by: Cassie Bates DO    Reason for Visit:   Chief Complaint   Patient presents with    Post-op     Surgery 25        [place] [id number]    Subjective     History of Presenting Illness:    Tanya Barrow is a 49 y.o. year old P5 with refractory incontinence who presents for follow up 6 weeks s/p retropubic sling placement.    Fortunately, she has finally noticed improvement in her symptoms, significant improvement and leaks with cough, sneeze, laugh.  This is the first time she has had improvement in her symptoms despite all of her prior sling procedures.  She is very happy with outcomes.  She still has some discomfort and cramping in the suprapubic area but this is improving.      Initial HPI: She presents for the evaluation and management of prolapse, severe incontinence, pelvic pain after multiple prior surgeries. She is currently most bothered by urinary incontinence with nearly all activities including exercise, urgency, and without sensation.  She has undergone 4 prior mid urethral sling procedures with Dr. Hernadez, none of which improved her symptoms and the last of which worsened her symptoms. She also has a subjective vaginal bulge, which feels worse than prior surgeries.  She has had 4 native tissue prolapse repairs.  After one of the surgeries, she had difficulty emptying her rectum and had to have mesh removed from the anal area during a revision, per her report.  She also has significant dyspareunia      Prior Pelvic surgery:   10/25/2016: BISMARK LUO, anterior/posterior repair, enterocele repair, sacrospinous suspension, transobturator sling  2017: Anterior/posterior pair, sacrospinous suspension, transobturator sling  10/17/2017: Anterior/posterior repair, vault suspension, uphold mesh, as obturator sling (Maricarmen)  2018:  "Anterior/posterior repair, \"tension-free tape\" (sounds like suprapubic sling from op note) (Hernadez)  2020: Robotic cholecystectomy  24: excision of transobturator sling mesh, urethral bulking, vaginal relaxation incision, pelvic floor trigger point injections (Josafat)  2025: Retropubic urethral sling, anterior repair with excision of anterior vaginal mesh. (Josafat)     Prior treatment:   Pessary - reaction to material      Pelvic floor symptom review:     Bladder:   Voids per day: 15-20 --> 10 Voids per night: 6-8 -->2   Urinary incontinence episodes per day: throughout the day    Urge leakage:  On Movement to Bathroom and Full Bladder --> improved   Stress leakage: With Cough, With Laugh, With Exercise, With Hallock, With Bending/Squatting, Upon Standing, and Full Bladder --> improved s/p surgery --> subsequently returned to baseline --> significant improvement with retropubic sling   Continuous / insensible urine loss: Yes   Nocturnal enuresis: Yes   Leakage volume: Moderate   Number of pads/day: 7-10    Bladder emptying: Complete   Voiding symptoms: Post-Void Dribble and Double or Triple Voiding--> improved   UTI in last 12 months: no   Other urologic history: no      Prolapse:     Prolapse symptoms: improved     Bowel:    Constipation: Yes   Bowel movements per day: 1    Straining to empty bowels: Yes   Splinting to evacuate: Yes   Painful evacuation: Yes   Difficulty emptying rectum: Yes   Incontinence to stool: no   Blood in stool: No    Hemorrhoids: No         Sexual function:    Sexually active: No    Gender of partners: Male   Pain with intercourse: yes,deep       Pelvic Pain: Yes, with walking/lifting      Past medical and surgical history    Past obstetric history   Number of vaginal deliveries: 4   Number of  deliveries: 0     Past gynecological history:    Last menstrual period: Patient's last menstrual period was 2010.   S/p hysterectomy      Past medical history:  Past Medical " History:   Diagnosis Date    Hypertension 01/22/2025    medicated    Heart burn 01/22/2025    medicated    Urinary incontinence 01/22/2025    uses pads    Diabetes 01/22/2025    Type 2 DM, medicated    Urinary bladder disorder 01/22/2025    incontinence    High cholesterol 01/22/2025    on medication    Sleep apnea 01/22/2025    uses CPAP    Breath shortness 01/22/2025    at times    Bowel habit changes 05/17/2024    constipation    Chest pain 02/2019    MPI with no ischemia or infarct, LVEF 72%.    Anemia     GERD (gastroesophageal reflux disease)     Hyperlipidemia     Obesity     Palpitations      Past surgical history:  Past Surgical History:   Procedure Laterality Date    MI ANTER COLPORRHAPHY,BLAD/VAGINA N/A 2/13/2025    Procedure: ANTERIOR REPAIR, MID URETHRAL SLING, MESH EXCISION;  Surgeon: Torie Maurice M.D.;  Location: SURGERY SAME DAY [facility];  Service: Gynecology    BLADDER SLING FEMALE N/A 2/13/2025    Procedure: BLADDER SLING, FEMALE;  Surgeon: Torie Maurice M.D.;  Location: SURGERY SAME DAY [facility];  Service: Gynecology    MI REVISION VAGINAL GRAFT, VAG APPROACH N/A 6/17/2024    Procedure: EXCISION OF MID URETHRAL SLING MESH, DORIS PLICATION, TRIGGER POINT INJECTION AND ALL INDICATED PROCEDURES, URETHRAL BULKING;  Surgeon: Torie Maurice M.D.;  Location: SURGERY SAME DAY [facility];  Service: Gynecology    INJECTION, TRIGGER POINT, VAGINAL/PELVIC N/A 6/17/2024    Procedure: INJECTION,TRIGGER POINT,VAGINAL/PELVIC;  Surgeon: Torie Maurice M.D.;  Location: SURGERY SAME DAY [facility];  Service: Gynecology    CHOLECYSTECTOMY ROBOTIC XI  12/9/2020    Procedure: CHOLECYSTECTOMY, ROBOT-ASSISTED, USING DA ROSA XI;  Surgeon: Nicho Copeland M.D.;  Location: SURGERY [facility];  Service: Gen Robotic    ANTERIOR AND POSTERIOR REPAIR  2/22/2018    Procedure: ANTERIOR AND POSTERIOR REPAIR;  Surgeon: Angel Hernadez M.D.;  Location: SURGERY SAME DAY [facility];  Service: Gynecology    ENTEROCELE REPAIR   2/22/2018    Procedure: ENTEROCELE REPAIR;  Surgeon: Angel Hernadez M.D.;  Location: SURGERY SAME DAY [facility];  Service: Gynecology    VAGINAL SUSPENSION N/A 2/22/2018    Procedure: VAGINAL SUSPENSION- SACROSPINOUS VAULT;  Surgeon: Angel Hernadez M.D.;  Location: SURGERY SAME DAY [facility];  Service: Gynecology    BLADDER SLING FEMALE N/A 2/22/2018    Procedure: BLADDER SLING FEMALE- TENSION FREE VAGINAL TAPE;  Surgeon: Angel Hernadez M.D.;  Location: SURGERY SAME DAY [facility];  Service: Gynecology    BLADDER SLING FEMALE  10/17/2017    Procedure: BLADDER SLING FEMALE - TENSION FREE TAPE PROCEDURE;  Surgeon: Angel Hernadez M.D.;  Location: SURGERY SAME DAY [facility];  Service: Gynecology    CYSTOSCOPY N/A 10/17/2017    Procedure: CYSTOSCOPY;  Surgeon: Angel Hernadez M.D.;  Location: SURGERY SAME DAY [facility];  Service: Gynecology    ANTERIOR AND POSTERIOR REPAIR  10/17/2017    Procedure: ANTERIOR AND POSTERIOR REPAIR W/UPHOLD MESH;  Surgeon: Angel Hernadez M.D.;  Location: SURGERY SAME DAY [facility];  Service: Gynecology    ENTEROCELE REPAIR  10/17/2017    Procedure: ENTEROCELE REPAIR - PERINEOPLASTY;  Surgeon: Angel Hernadez M.D.;  Location: SURGERY SAME DAY [facility];  Service: Gynecology    VAGINAL SUSPENSION  10/17/2017    Procedure: VAGINAL SUSPENSION - SACROSPINOUS VAULT;  Surgeon: Angel Hernadez M.D.;  Location: SURGERY SAME DAY [facility];  Service: Gynecology    BLADDER SLING FEMALE  4/11/2017    Procedure: BLADDER SLING FEMALE-TOT;  Surgeon: Angel Hernadez M.D.;  Location: SURGERY SAME DAY [facility];  Service:     ANTERIOR AND POSTERIOR REPAIR  4/11/2017    Procedure: ANTERIOR AND POSTERIOR REPAIR;  Surgeon: Angel Hernadez M.D.;  Location: SURGERY SAME DAY [facility];  Service:     ENTEROCELE REPAIR  4/11/2017    Procedure: ENTEROCELE REPAIR- PERINEOPLASTY;  Surgeon: Angel Hernadez M.D.;  Location: SURGERY SAME DAY [facility];  Service:     VAGINAL  SUSPENSION  4/11/2017    Procedure: VAGINAL SUSPENSION-SACROSPINOUS VAULT;  Surgeon: Angel Hernadez M.D.;  Location: SURGERY SAME DAY [facility];  Service:     BLADDER SLING FEMALE  10/25/2016    Procedure: BLADDER SLING FEMALE TOT;  Surgeon: Angel Hernadez M.D.;  Location: SURGERY SAME DAY [facility];  Service:     VAGINAL HYSTERECTOMY SCOPE TOTAL  10/25/2016    Procedure: VAGINAL HYSTERECTOMY SCOPE TOTAL;  Surgeon: Angel Hernadez M.D.;  Location: SURGERY SAME DAY [facility];  Service:     SALPINGECTOMY Bilateral 10/25/2016    Procedure: SALPINGECTOMY;  Surgeon: Angel Hernadez M.D.;  Location: SURGERY SAME DAY [facility];  Service:     ANTERIOR AND POSTERIOR REPAIR  10/25/2016    Procedure: ANTERIOR AND POSTERIOR REPAIR;  Surgeon: Angel Hernadez M.D.;  Location: SURGERY SAME DAY [facility];  Service:     ENTEROCELE REPAIR  10/25/2016    Procedure: ENTEROCELE REPAIR, PERINEOPLASTY;  Surgeon: Angel Hernadez M.D.;  Location: SURGERY SAME DAY [facility];  Service:     VAGINAL SUSPENSION  10/25/2016    Procedure: VAGINAL SUSPENSION SACROSPINOUS VAULT ;  Surgeon: Angel Hernadez M.D.;  Location: SURGERY SAME DAY [facility];  Service:     OTHER      Tubal ligation     Medications:has a current medication list which includes the following prescription(s): acetaminophen, ibuprofen, omeprazole, fluticasone propionate, ashwagandha, hydrochlorothiazide, trulicity, multiple vitamins-minerals, estradiol, jardiance, metoprolol sr, losartan, atorvastatin, insulin glargine, and aspirin ec.  Allergies:Latex, Nitrofurantoin, and Other food  Family history:  Family History   Problem Relation Age of Onset    Diabetes Mother         pills    Hypertension Mother     Diabetes Father         insulin    Diabetes Paternal Grandmother     Diabetes Paternal Aunt      Social history: reports that she has never smoked. She has never been exposed to tobacco smoke. She has never used smokeless tobacco. She reports that  she does not currently use alcohol. She reports that she does not use drugs.    Review of systems: A full review of systems was performed, and negative with the exception of want is noted above in the HPI.        Objective        BP (!) 140/78 (BP Location: Right arm, Patient Position: Sitting, BP Cuff Size: Large adult)   LMP 01/12/2010     Physical Exam  Vitals reviewed. Exam conducted with a chaperone present (MA - see notes.).   Constitutional:       Appearance: Normal appearance.   HENT:      Head: Normocephalic.      Mouth/Throat:      Mouth: Mucous membranes are moist.   Cardiovascular:      Rate and Rhythm: Normal rate.   Pulmonary:      Effort: Pulmonary effort is normal.   Abdominal:      Palpations: Abdomen is soft. There is no mass.      Tenderness: There is no abdominal tenderness.   Skin:     General: Skin is warm and dry.   Neurological:      Mental Status: She is alert.   Psychiatric:         Mood and Affect: Mood normal.       Genitourinary:    Vulva: WNL   Bulbocavernosus reflex: Intact   Anal wink reflex: Intact   Perineal sensation: WNL   Urethra: Detailed examination with well-healed suture lines, no visible or palpable mesh exposures, some Vicryl sutures still present.  Resolved hypermobility   Atrophy: Moderate   Cough stress test:Negative postop    Pelvic floor:    POP-Q: Postop Aa -2 / Ba -2 / TVL 9 / C -6 / D x / Ap -1 / Bp -1 / GH 4.5 / PB 2   Bladder/ suprapubic tenderness: Yes   Levator tenderness: Bilateral   Levator muscle tone: Hypertonic -->  persistent   Pelvic floor contraction strength (modified Oxford scale): 2=Weak   Pelvic floor contraction duration: Brief    Bimanual exam: no masses    Procedure Performed: No    Diagnostic test and records review:    PVR today: 38 mL    Labs: n/a    Radiology: n/a    Procedural:     Urodynamics 2/2024  Filling phase: Just reached normal capacity with some difficulty,  some uninhibited detrusor contractions without leak, borderline MUCP.  Stress leak at both half and full capacity  Voiding phase: Complete emptying via detrusor contraction       Documentation reviewed: Prior EMR Records, op reports (see HPI)           Assessment & Plan     Tanya Barrow is a 49 y.o. year old P5 with severe mixed urinary incontinence/OAB after 4 prior TOT sling procedures, chronic pelvic pain/dyspareunia, mild cystocele, now post op s/p mesh removal and revision with bulking, recurrent UI, and finally a repeat retropubic sling      1. Urinary incontinence, mixed, OAB, nocturia  2. Incontinence without sensory awareness  3. History of suburethral sling procedure x4 (toMUSx3, rMUSx1)  -Despite her very complex history of multiple prior transobturator sling, sling excision, and bulkings, she finally has improvement with a retropubic mid urethral sling.  She is healed well, no mesh exposure is noted.  She may return to all normal activities, with the exception of intercourse which I would prefer she wait an additional 2 to 3 weeks to allow full suture healing.  -Counseled to observe her symptoms with increase in activities.  If she has further issues, she may follow-up, but no additional postop visit is needed.      4. Cystocele, midline  Resolved after anterior repair.  She was counseled that a small area of anterior vaginal mesh was noted during her dissection which was removed.    5. Dyspareunia in female  Not addressed today  - s/p vaginal band revision   -Continue significant hypertonic pelvic floor musculature including puborectalis muscles with tenderness.  Again, she is counseled that this is a physical therapy issue rather than a surgical issue, and strongly reinforced following up with PT.  -She now has an appointment with pelvic physical therapy, although they are booked out to July.  I recommended she make this appointment and follow-up postoperatively to address her neuromuscular issues.    6. Vaginal atrophy  Continue vaginal estrogen twice  weekly, pause for 2 weeks postop then resume to help tissue healing    7. Chronic constipation  Not addressed today. This is a longstanding issue.  Due to her constipation I do not recommend trial of anticholinergics before her bladder testing, as they may worsen her bowel function            Torie Maurice MD, FACOG  Urogynecology and Reconstructive Pelvic Surgery  Department of Obstetrics and Gynecology  [facility]        This medical record contains text that has been entered with the assistance of computer voice recognition and dictation software.  Therefore, it may contain unintended errors in text, spelling, punctuation, or grammar

## 2025-03-27 ENCOUNTER — GYNECOLOGY VISIT (OUTPATIENT)
Dept: GYNECOLOGY | Facility: CLINIC | Age: 50
End: 2025-03-27
Payer: MEDICAID

## 2025-03-27 VITALS — DIASTOLIC BLOOD PRESSURE: 78 MMHG | SYSTOLIC BLOOD PRESSURE: 140 MMHG

## 2025-03-27 DIAGNOSIS — Z98.890 POSTOPERATIVE STATE: ICD-10-CM

## 2025-03-27 PROCEDURE — 3078F DIAST BP <80 MM HG: CPT | Performed by: STUDENT IN AN ORGANIZED HEALTH CARE EDUCATION/TRAINING PROGRAM

## 2025-03-27 PROCEDURE — 99024 POSTOP FOLLOW-UP VISIT: CPT | Performed by: STUDENT IN AN ORGANIZED HEALTH CARE EDUCATION/TRAINING PROGRAM

## 2025-03-27 PROCEDURE — 3077F SYST BP >= 140 MM HG: CPT | Performed by: STUDENT IN AN ORGANIZED HEALTH CARE EDUCATION/TRAINING PROGRAM

## 2025-03-27 NOTE — LETTER
March 27, 2025    To Whom It May Concern:         This is confirmation that Tanya Bonilla Charli Gaston attended her scheduled appointment with Torie Maurice M.D. on 3/27/25. Tanya is unable to perform any heavy lifting, pushing, or pulling more than 20 lbs for an additional two weeks.          If you have any questions please do not hesitate to call me at the phone number listed below.    Sincerely,          Dr. Torie Maurice      [phone]

## 2025-04-08 RX ORDER — ESTRADIOL 0.1 MG/G
CREAM VAGINAL
Qty: 1 EACH | Refills: 6 | Status: SHIPPED | OUTPATIENT
Start: 2025-04-08

## 2025-07-30 ENCOUNTER — OFFICE VISIT (OUTPATIENT)
Dept: URGENT CARE | Facility: PHYSICIAN GROUP | Age: 50
End: 2025-07-30
Payer: MEDICAID

## 2025-07-30 VITALS
RESPIRATION RATE: 13 BRPM | HEART RATE: 58 BPM | HEIGHT: 64 IN | DIASTOLIC BLOOD PRESSURE: 90 MMHG | TEMPERATURE: 98.2 F | SYSTOLIC BLOOD PRESSURE: 164 MMHG | BODY MASS INDEX: 34.83 KG/M2 | OXYGEN SATURATION: 97 % | WEIGHT: 204 LBS

## 2025-07-30 DIAGNOSIS — R20.2 NUMBNESS AND TINGLING OF LEFT SIDE OF FACE: ICD-10-CM

## 2025-07-30 DIAGNOSIS — R07.9 CHEST PAIN, UNSPECIFIED TYPE: ICD-10-CM

## 2025-07-30 DIAGNOSIS — R20.0 NUMBNESS AND TINGLING OF LEFT SIDE OF FACE: ICD-10-CM

## 2025-07-30 DIAGNOSIS — I10 ELEVATED BLOOD PRESSURE READING WITH DIAGNOSIS OF HYPERTENSION: Primary | ICD-10-CM

## 2025-07-30 LAB — GLUCOSE BLD-MCNC: 145 MG/DL (ref 65–99)

## 2025-07-30 ASSESSMENT — ENCOUNTER SYMPTOMS
BLURRED VISION: 0
SENSORY CHANGE: 1
LOSS OF CONSCIOUSNESS: 0
SHORTNESS OF BREATH: 0
FEVER: 0
HEADACHES: 1
FOCAL WEAKNESS: 0
SPEECH CHANGE: 0

## 2025-07-30 ASSESSMENT — FIBROSIS 4 INDEX: FIB4 SCORE: 0.79

## 2025-07-30 NOTE — LETTER
July 30, 2025    To Whom It May Concern:         This is confirmation that Tanya Bonilla Charli Gaston attended her scheduled appointment with Zenaida Puri P.A.-C. on 7/30/25. Please excuse her absence from work today.          If you have any questions please do not hesitate to call me at the phone number listed below.    Sincerely,          Zenaida Puri P.A.-C.  [phone]

## 2025-07-30 NOTE — PROGRESS NOTES
Subjective:     CHIEF COMPLAINT  Chief Complaint   Patient presents with    Dizziness     Dizziness and tingly left side of face.        JAS Barrow is a very pleasant 50 y.o. female who presents with a left-sided frontal headache, numbness/tingling on the left side of her face and her lips, and tingling/the sensation of weakness in her left upper extremity/hand, as well as new onset chest pain.  Her symptoms started this morning at 9:30 AM.  She reports that she has been sleeping on the floor secondary to a work injury to her neck and back and woke up with these symptoms.  She denies any shortness of breath. She denies any tenderness to her chest wall.  She denies any blurred vision.  She denies any speech changes.  She has been able to ambulate normally.  She does report a history of chest pain many years ago, but has not had any chest pain recently up until now.    REVIEW OF SYSTEMS  Review of Systems   Constitutional:  Negative for fever.   Eyes:  Negative for blurred vision.   Respiratory:  Negative for shortness of breath.    Cardiovascular:  Positive for chest pain. Negative for leg swelling.   Neurological:  Positive for sensory change (L sided facial tingling, L hand tingling) and headaches (L frontal). Negative for speech change, focal weakness and loss of consciousness.       PAST MEDICAL HISTORY  Patient Active Problem List    Diagnosis Date Noted    Urinary incontinence, mixed 01/15/2024    Incontinence without sensory awareness 01/15/2024    History of suburethral sling procedure x4 (toMUSx3, rMUSx1) 01/15/2024    Dyspareunia in female 01/15/2024    Vaginal atrophy 01/15/2024    Nocturia 01/15/2024    OAB (overactive bladder) 01/15/2024    Class 1 obesity in adult 09/20/2022    GERD (gastroesophageal reflux disease) 09/20/2022    Obstructive sleep apnea 08/10/2021    HLD (hyperlipidemia) 02/04/2019    HTN (hypertension) 10/03/2018    Type 2 diabetes mellitus without complication,  with long-term current use of insulin (HCC) 10/03/2018    Palpitations 11/08/2017    Urinary, incontinence, stress female 10/17/2017    Female stress incontinence 04/11/2017    Cystocele, midline 04/11/2017    Rectocele 04/11/2017    GBS (group B Streptococcus carrier), +RV culture, currently pregnant 09/06/2010    Increased BMI 03/26/2010       SURGICAL HISTORY   has a past surgical history that includes other; bladder sling female (10/25/2016); bladder sling female (4/11/2017); bladder sling female (10/17/2017); cystoscopy (N/A, 10/17/2017); vaginal hysterectomy scope total (10/25/2016); salpingectomy (Bilateral, 10/25/2016); anterior and posterior repair (10/25/2016); enterocele repair (10/25/2016); vaginal suspension (10/25/2016); anterior and posterior repair (4/11/2017); enterocele repair (4/11/2017); vaginal suspension (4/11/2017); anterior and posterior repair (10/17/2017); enterocele repair (10/17/2017); vaginal suspension (10/17/2017); anterior and posterior repair (2/22/2018); enterocele repair (2/22/2018); vaginal suspension (N/A, 2/22/2018); bladder sling female (N/A, 2/22/2018); cholecystectomy robotic xi (12/9/2020); revision vaginal graft, vag approach (N/A, 6/17/2024); injection, trigger point, vaginal/pelvic (N/A, 6/17/2024); anter colporrhaphy,blad/vagina (N/A, 2/13/2025); and bladder sling female (N/A, 2/13/2025).    ALLERGIES  Allergies[1]    CURRENT MEDICATIONS  Home Medications       Reviewed by Zenaida Puri P.A.-C. (Physician Assistant) on 07/30/25 at 1135  Med List Status: <None>     Medication Last Dose Status   acetaminophen (TYLENOL) 500 MG Tab  Active   ASHWAGANDHA PO 2/11/2025 Active   aspirin EC (ECOTRIN) 81 MG Tablet Delayed Response 2/1/2025 Active   atorvastatin (LIPITOR) 40 MG Tab 2/9/2025 Active   Empagliflozin (JARDIANCE) 25 MG Tab 2/8/2025 Active   estradiol (ESTRACE VAGINAL) 0.1 MG/GM vaginal cream  Active   FLUTICASONE PROPIONATE, NASAL, NA 2/9/2025 Active  "  hydroCHLOROthiazide 12.5 MG tablet 2/11/2025 Active   ibuprofen (MOTRIN) 400 MG Tab  Active   Insulin Glargine (BASAGLAR KWIKPEN) 100 UNIT/ML Solution Pen-injector 2/12/2025 Active   losartan (COZAAR) 100 MG Tab 2/10/2025 Active   metoprolol SR (TOPROL XL) 50 MG TABLET SR 24 HR 2/12/2025 Active   Multiple Vitamins-Minerals (WOMENS 50+ MULTI VITAMIN PO) 2/8/2025 Active   omeprazole (PRILOSEC) 20 MG Tablet Delayed Response delayed-release tablet 2/11/2025 Active   TRULICITY 0.75 MG/0.5ML Solution Pen-injector 2/3/2025 Active                    SOCIAL HISTORY  Social History     Tobacco Use    Smoking status: Never     Passive exposure: Never    Smokeless tobacco: Never   Vaping Use    Vaping status: Never Used   Substance and Sexual Activity    Alcohol use: Not Currently     Comment: only socially    Drug use: Never    Sexual activity: Not Currently     Partners: Male       FAMILY HISTORY  Family History   Problem Relation Age of Onset    Diabetes Mother         pills    Hypertension Mother     Diabetes Father         insulin    Diabetes Paternal Grandmother     Diabetes Paternal Aunt           Objective:     VITAL SIGNS: BP (!) 164/90   Pulse (!) 58   Temp 36.8 °C (98.2 °F) (Temporal)   Resp 13   Ht 1.626 m (5' 4\")   Wt 92.5 kg (204 lb)   LMP 01/12/2010   SpO2 97%   BMI 35.02 kg/m²     PHYSICAL EXAM  Physical Exam  Vitals reviewed.   Constitutional:       General: She is not in acute distress.     Appearance: Normal appearance. She is not ill-appearing or toxic-appearing.   HENT:      Head: Normocephalic and atraumatic.      Mouth/Throat:      Mouth: Mucous membranes are moist.   Eyes:      Extraocular Movements: Extraocular movements intact.      Conjunctiva/sclera: Conjunctivae normal.      Pupils: Pupils are equal, round, and reactive to light.   Cardiovascular:      Rate and Rhythm: Regular rhythm. Bradycardia present.      Pulses:           Radial pulses are 2+ on the right side and 2+ on the left " side.      Heart sounds: Normal heart sounds, S1 normal and S2 normal.   Pulmonary:      Effort: Pulmonary effort is normal. No respiratory distress.      Breath sounds: Normal breath sounds. No stridor. No wheezing, rhonchi or rales.   Chest:      Chest wall: No tenderness.   Musculoskeletal:      Cervical back: Normal range of motion.      Right lower leg: No edema.      Left lower leg: No edema.   Skin:     General: Skin is warm and dry.      Coloration: Skin is not pale.      Findings: No bruising or rash.   Neurological:      General: No focal deficit present.      Mental Status: She is alert and oriented to person, place, and time.      Cranial Nerves: Cranial nerves 2-12 are intact. No dysarthria or facial asymmetry.      Sensory: Sensation is intact.      Motor: Motor function is intact. No weakness, tremor or pronator drift.      Coordination: Coordination is intact. Romberg sign negative. Coordination normal. Rapid alternating movements normal.      Gait: Gait is intact.      Comments:  strength 5/5 bilaterally, upper extremity strength 2 flexion and extension of elbow 5/5 bilaterally.  Shoulder strength 5/5 bilaterally.  Lower extremity strength to flexion and extension 5/5 bilaterally.  Sensation 5/5 bilaterally on upper and lower extremities  and face.  No facial asymmetry.  No speech changes.  Normal ambulation.   Psychiatric:         Mood and Affect: Mood normal.         Speech: Speech normal.         Behavior: Behavior normal.         Assessment/Plan:     1. Elevated blood pressure reading with diagnosis of hypertension    2. Chest pain, unspecified type  - EKG - Clinic Performed    3. Numbness and tingling of left side of face  - POCT Glucose  - Patient has been referred to the emergency department immediately for further investigation into symptoms    MDM/Comments:  The patient has a history of hypertension as well as type 2 diabetes.  She has an elevated blood pressure on physical exam and  complains of chest pain as well as left-sided tingling.  An EKG was obtained in the office showing sinus bradycardia.  There is a T wave inversion in lead III that was shown on a prior EKG from 2/3/2025.  There is no ST elevation or depression.  I personally interpreted EKG results.  EKG results open discussed with the patient.  POCT glucose obtained as 145. Discussed with the patient that we are unable to rule out a cardiac origin of chest pain as well as sensory changes in the urgent care safely.  Patient's neuroexam was within normal limits without any visible facial asymmetry, speech changes, weakness, sensory change, etc.  Discussed with patient that her symptoms are alarming for a potential CVA.  She has been advised to be seen in the emergency department immediately for further investigation into symptoms.  The patient has been advised to be seen via ambulance, but has declined at this time.  Discussed with the patient that being seen in a timely manner is vital.  She demonstrated understanding and will be seen at [facility], directly of the street from the clinic immediately.    Differential diagnosis, natural history, supportive care, and indications for immediate follow-up discussed. All questions answered. Patient agrees with the plan of care.    Follow-up as needed if symptoms worsen or fail to improve to PCP, Urgent care or Emergency Room.    I have personally reviewed prior external notes and test results pertinent to today's visit.  I have independently reviewed and interpreted all diagnostics ordered during this urgent care acute visit.   Discussed management options (risks,benefits, and alternatives to treatment). Pt expresses understanding and the treatment plan was agreed upon. Questions were encouraged and answered to pt's satisfaction.    Please note that this dictation was created using voice recognition software. I have made a reasonable attempt to correct obvious errors,  but I expect that there are errors of grammar and possibly content that I did not discover before finalizing the note.             [1]   Allergies  Allergen Reactions    Latex Rash     Condons, rash  Latex gloves, rash    Nitrofurantoin Palpitations     Per pt     Other Food Itching     beef

## 2025-08-02 ENCOUNTER — APPOINTMENT (OUTPATIENT)
Dept: RADIOLOGY | Facility: MEDICAL CENTER | Age: 50
End: 2025-08-02
Payer: MEDICAID

## 2025-08-02 ENCOUNTER — HOSPITAL ENCOUNTER (EMERGENCY)
Facility: MEDICAL CENTER | Age: 50
End: 2025-08-02
Attending: STUDENT IN AN ORGANIZED HEALTH CARE EDUCATION/TRAINING PROGRAM
Payer: MEDICAID

## 2025-08-02 ENCOUNTER — APPOINTMENT (OUTPATIENT)
Dept: RADIOLOGY | Facility: MEDICAL CENTER | Age: 50
End: 2025-08-02
Attending: STUDENT IN AN ORGANIZED HEALTH CARE EDUCATION/TRAINING PROGRAM
Payer: MEDICAID

## 2025-08-02 VITALS
RESPIRATION RATE: 20 BRPM | BODY MASS INDEX: 34.83 KG/M2 | OXYGEN SATURATION: 92 % | HEIGHT: 64 IN | WEIGHT: 204 LBS | HEART RATE: 81 BPM | SYSTOLIC BLOOD PRESSURE: 164 MMHG | DIASTOLIC BLOOD PRESSURE: 75 MMHG | TEMPERATURE: 98 F

## 2025-08-02 DIAGNOSIS — I15.9 SECONDARY HYPERTENSION: Primary | ICD-10-CM

## 2025-08-02 LAB
ALBUMIN SERPL BCP-MCNC: 4.4 G/DL (ref 3.2–4.9)
ALBUMIN/GLOB SERPL: 1.3 G/DL
ALP SERPL-CCNC: 96 U/L (ref 30–99)
ALT SERPL-CCNC: 26 U/L (ref 2–50)
ANION GAP SERPL CALC-SCNC: 16 MMOL/L (ref 7–16)
AST SERPL-CCNC: 29 U/L (ref 12–45)
BASOPHILS # BLD AUTO: 0.5 % (ref 0–1.8)
BASOPHILS # BLD: 0.04 K/UL (ref 0–0.12)
BILIRUB SERPL-MCNC: 0.3 MG/DL (ref 0.1–1.5)
BUN SERPL-MCNC: 22 MG/DL (ref 8–22)
CALCIUM ALBUM COR SERPL-MCNC: 9.4 MG/DL (ref 8.5–10.5)
CALCIUM SERPL-MCNC: 9.7 MG/DL (ref 8.5–10.5)
CHLORIDE SERPL-SCNC: 103 MMOL/L (ref 96–112)
CO2 SERPL-SCNC: 20 MMOL/L (ref 20–33)
CREAT SERPL-MCNC: 0.78 MG/DL (ref 0.5–1.4)
EKG IMPRESSION: NORMAL
EOSINOPHIL # BLD AUTO: 0.09 K/UL (ref 0–0.51)
EOSINOPHIL NFR BLD: 1.1 % (ref 0–6.9)
ERYTHROCYTE [DISTWIDTH] IN BLOOD BY AUTOMATED COUNT: 42.5 FL (ref 35.9–50)
GFR SERPLBLD CREATININE-BSD FMLA CKD-EPI: 91 ML/MIN/1.73 M 2
GLOBULIN SER CALC-MCNC: 3.4 G/DL (ref 1.9–3.5)
GLUCOSE SERPL-MCNC: 184 MG/DL (ref 65–99)
HCG SERPL QL: NEGATIVE
HCT VFR BLD AUTO: 42.4 % (ref 37–47)
HGB BLD-MCNC: 14 G/DL (ref 12–16)
IMM GRANULOCYTES # BLD AUTO: 0.03 K/UL (ref 0–0.11)
IMM GRANULOCYTES NFR BLD AUTO: 0.4 % (ref 0–0.9)
LYMPHOCYTES # BLD AUTO: 3.04 K/UL (ref 1–4.8)
LYMPHOCYTES NFR BLD: 37.2 % (ref 22–41)
MCH RBC QN AUTO: 27.8 PG (ref 27–33)
MCHC RBC AUTO-ENTMCNC: 33 G/DL (ref 32.2–35.5)
MCV RBC AUTO: 84.1 FL (ref 81.4–97.8)
MONOCYTES # BLD AUTO: 0.48 K/UL (ref 0–0.85)
MONOCYTES NFR BLD AUTO: 5.9 % (ref 0–13.4)
NEUTROPHILS # BLD AUTO: 4.49 K/UL (ref 1.82–7.42)
NEUTROPHILS NFR BLD: 54.9 % (ref 44–72)
NRBC # BLD AUTO: 0 K/UL
NRBC BLD-RTO: 0 /100 WBC (ref 0–0.2)
NT-PROBNP SERPL IA-MCNC: 98 PG/ML (ref 0–125)
PLATELET # BLD AUTO: 205 K/UL (ref 164–446)
PMV BLD AUTO: 11.5 FL (ref 9–12.9)
POTASSIUM SERPL-SCNC: 3.3 MMOL/L (ref 3.6–5.5)
PROT SERPL-MCNC: 7.8 G/DL (ref 6–8.2)
RBC # BLD AUTO: 5.04 M/UL (ref 4.2–5.4)
SODIUM SERPL-SCNC: 139 MMOL/L (ref 135–145)
TROPONIN T SERPL-MCNC: 7 NG/L (ref 6–19)
WBC # BLD AUTO: 8.2 K/UL (ref 4.8–10.8)

## 2025-08-02 PROCEDURE — 36415 COLL VENOUS BLD VENIPUNCTURE: CPT

## 2025-08-02 PROCEDURE — 84703 CHORIONIC GONADOTROPIN ASSAY: CPT

## 2025-08-02 PROCEDURE — 80053 COMPREHEN METABOLIC PANEL: CPT

## 2025-08-02 PROCEDURE — A9270 NON-COVERED ITEM OR SERVICE: HCPCS | Mod: UD | Performed by: STUDENT IN AN ORGANIZED HEALTH CARE EDUCATION/TRAINING PROGRAM

## 2025-08-02 PROCEDURE — 71045 X-RAY EXAM CHEST 1 VIEW: CPT

## 2025-08-02 PROCEDURE — 93005 ELECTROCARDIOGRAM TRACING: CPT | Mod: TC | Performed by: STUDENT IN AN ORGANIZED HEALTH CARE EDUCATION/TRAINING PROGRAM

## 2025-08-02 PROCEDURE — 85025 COMPLETE CBC W/AUTO DIFF WBC: CPT

## 2025-08-02 PROCEDURE — 84484 ASSAY OF TROPONIN QUANT: CPT

## 2025-08-02 PROCEDURE — 83880 ASSAY OF NATRIURETIC PEPTIDE: CPT

## 2025-08-02 PROCEDURE — 99284 EMERGENCY DEPT VISIT MOD MDM: CPT

## 2025-08-02 PROCEDURE — 93005 ELECTROCARDIOGRAM TRACING: CPT | Mod: TC

## 2025-08-02 PROCEDURE — 700102 HCHG RX REV CODE 250 W/ 637 OVERRIDE(OP): Mod: UD | Performed by: STUDENT IN AN ORGANIZED HEALTH CARE EDUCATION/TRAINING PROGRAM

## 2025-08-02 RX ORDER — POTASSIUM CHLORIDE 1500 MG/1
20 TABLET, EXTENDED RELEASE ORAL ONCE
Status: COMPLETED | OUTPATIENT
Start: 2025-08-03 | End: 2025-08-02

## 2025-08-02 RX ADMIN — POTASSIUM CHLORIDE 20 MEQ: 1500 TABLET, EXTENDED RELEASE ORAL at 23:44

## 2025-08-02 ASSESSMENT — FIBROSIS 4 INDEX: FIB4 SCORE: 0.79

## (undated) DEVICE — TUBE E-T HI-LO CUFF 7.0MM (10EA/PK)

## (undated) DEVICE — SUTURE CAPIO (12EA/BX)

## (undated) DEVICE — BAG RETRIEVAL 5MM

## (undated) DEVICE — DERMABOND ADVANCED - (12EA/BX)

## (undated) DEVICE — TRAY FOLEY CATHETER STATLOCK 16FR SURESTEP (10EA/CA)

## (undated) DEVICE — SUTURE 0 VICRYL PLUS CT-1 - 8 X 18 INCH (12/BX)

## (undated) DEVICE — GLOVE BIOGEL PI INDICATOR SZ 7.0 SURGICAL PF LF - (50/BX 4BX/CA)

## (undated) DEVICE — ADHESIVE DERMABOND HVD MINI (12EA/BX)

## (undated) DEVICE — SET EXTENSION WITH 2 PORTS (48EA/CA) ***PART #2C8610 IS A SUBSTITUTE*****

## (undated) DEVICE — WATER IRRIGATION STERILE 1000ML (12EA/CA)

## (undated) DEVICE — GOWN WARMING STANDARD FLEX - (30/CA)

## (undated) DEVICE — MASK ANESTHESIA ADULT  - (100/CA)

## (undated) DEVICE — Device

## (undated) DEVICE — CHLORAPREP 26 ML APPLICATOR - ORANGE TINT(25/CA)

## (undated) DEVICE — MASK, LARYNGEAL AIRWAY #4

## (undated) DEVICE — SUCTION INSTRUMENT YANKAUER BULBOUS TIP W/O VENT (50EA/CA)

## (undated) DEVICE — BLADE SURGICAL #11 - (50/BX)

## (undated) DEVICE — GLOVE BIOGEL SZ 7 SURGICAL PF LTX - (50PR/BX 4BX/CA)

## (undated) DEVICE — KIT ANESTHESIA W/CIRCUIT & 3/LT BAG W/FILTER (20EA/CA)

## (undated) DEVICE — HEAD HOLDER JUNIOR/ADULT

## (undated) DEVICE — TUBE CONNECTING SUCTION - CLEAR PLASTIC STERILE 72 IN (50EA/CA)

## (undated) DEVICE — GLOVE BIOGEL PI ULTRATOUCH SZ 7.5 SURGICAL PF LF -(50/BX 4BX/CA)

## (undated) DEVICE — DEVICE SUTURE CAPTURING

## (undated) DEVICE — KIT  I.V. START (100EA/CA)

## (undated) DEVICE — TOWEL STOP TIMEOUT SAFETY FLAG (40EA/CA)

## (undated) DEVICE — CANISTER SUCTION 3000ML MECHANICAL FILTER AUTO SHUTOFF MEDI-VAC NONSTERILE LF DISP  (40EA/CA)

## (undated) DEVICE — SUTURE GENERAL

## (undated) DEVICE — CLIP HEMOLOCK PURPLE - (14/BX)

## (undated) DEVICE — FORCEPS FENESTRATED BIPOLAR DA VINCI 10X'S REUSABLE

## (undated) DEVICE — LACTATED RINGERS INJ 1000 ML - (14EA/CA 60CA/PF)

## (undated) DEVICE — HOOK PERMANENT CAUTERY DA VINCI 10X'S REUSABLE

## (undated) DEVICE — SODIUM CHL IRRIGATION 0.9% 1000ML (12EA/CA)

## (undated) DEVICE — BLADE SURGICAL #10 - (50/BX)

## (undated) DEVICE — CANISTER SUCTION RIGID RED 1500CC (40EA/CA)

## (undated) DEVICE — SET LEADWIRE 5 LEAD BEDSIDE DISPOSABLE ECG (1SET OF 5/EA)

## (undated) DEVICE — DRAPE VAGINAL BIB W/ POUCH (10EA/CA)

## (undated) DEVICE — CATHETER IV 20 GA X 1-1/4 ---SURG.& SDS ONLY--- (50EA/BX)

## (undated) DEVICE — SENSOR SPO2 NEO LNCS ADHESIVE (20/BX) SEE USER NOTES

## (undated) DEVICE — SUTURE 0 VICRYL PLUS CT-2 - 27 INCH (36/BX)

## (undated) DEVICE — ELECTRODE DUAL RETURN W/ CORD - (50/PK)

## (undated) DEVICE — BLADE SURGICAL #15 - (50/BX 3BX/CA)

## (undated) DEVICE — PAD SANITARY 11IN MAXI IND WRAPPED (12EA/PK 24PK/CA)

## (undated) DEVICE — SYRINGE 30 ML LS (56/BX)

## (undated) DEVICE — ELECTRODE ROLLERBALL 3MM 24FR (6EA/PK)

## (undated) DEVICE — GLOVE BIOGEL INDICATOR SZ 7.5 SURGICAL PF LTX - (50PR/BX 4BX/CA)

## (undated) DEVICE — TRAY SRGPRP PVP IOD WT PRP - (20/CA)

## (undated) DEVICE — TRAY FOLEY CATHETER STATLOCK 16FR SURESTEP  (10EA/CA)

## (undated) DEVICE — CANISTER SUCTION 3000ML MECHANICAL FILTER AUTO SHUTOFF MEDI-VAC NONSTERILE LF DISP (40EA/CA)

## (undated) DEVICE — KIT SURGIFLO W/OUT THROMBIN - (6EA/CA)

## (undated) DEVICE — WATER IRRIG. STER 3000 ML - (4/CA)

## (undated) DEVICE — SET IRRIGATION CYSTOSCOPY TUBE L80 IN (20EA/CA)

## (undated) DEVICE — TUBING CLEARLINK DUO-VENT - C-FLO (48EA/CA)

## (undated) DEVICE — PROTECTOR ULNA NERVE - (36PR/CA)

## (undated) DEVICE — SUTURE 2-0 VICRYL PLUS SH - 27 INCH (36/BX)

## (undated) DEVICE — ROBOTIC SURGERY SERVICES

## (undated) DEVICE — GLOVE BIOGEL SZ 8 SURGICAL PF LTX - (50PR/BX 4BX/CA)

## (undated) DEVICE — SUTURE 4-0 PDS II RB-1 (36EA/BX)

## (undated) DEVICE — SYRINGE 10 ML CONTROL LL (25EA/BX 4BX/CA)

## (undated) DEVICE — SCRUB SOLUTION EXIDINE 4% 40Z - 48/CS CHLORAHEXADINE GLUCONATE

## (undated) DEVICE — MASK OXYGEN VNYL ADLT MED CONC WITH 7 FOOT TUBING  - (50EA/CA)

## (undated) DEVICE — PAD SANITARY 11IN MAXI IND WRAPPED  (12EA/PK 24PK/CA)

## (undated) DEVICE — CANNULA O2 COMFORT SOFT EAR ADULT 7 FT TUBING (50/CA)

## (undated) DEVICE — ELECTRODE 850 FOAM ADHESIVE - HYDROGEL RADIOTRNSPRNT (50/PK)

## (undated) DEVICE — DECANTER FLD BLS - (50/CA)

## (undated) DEVICE — JELLY SURGILUBE STERILE TUBE 4.25 OZ (1/EA)

## (undated) DEVICE — ANTI-FOG SOLUTION - 60BTL/CA

## (undated) DEVICE — TUBE CONNECT SUCTION CLEAR 120 X 1/4" (50EA/CA)"

## (undated) DEVICE — RINGDISP RETRACTOR LONESTAR

## (undated) DEVICE — CATHETER URETHRAL FOLEY SILICONE OD14 FR 10 ML (10EA/CA)

## (undated) DEVICE — SUTURE 2-0 VICRYL PLUS CT-1 36 (36PK/BX)"

## (undated) DEVICE — LEAD SET 6 DISP. EKG NIHON KOHDEN

## (undated) DEVICE — GLOVE BIOGEL PI INDICATOR SZ 8.0 SURGICAL PF LF -(50/BX 4BX/CA)

## (undated) DEVICE — NEEDLE SPINAL NON-SAFETY 20 GA X 3 IN (25/BX)

## (undated) DEVICE — SLEEVE, VASO, THIGH, MED

## (undated) DEVICE — MASK AIRWAY SIZE 3 UNIQUE SILICON (10/BX)

## (undated) DEVICE — NEPTUNE 4 PORT MANIFOLD - (20/PK)

## (undated) DEVICE — GLOVE BIOGEL SZ 7.5 SURGICAL PF LTX - (50PR/BX 4BX/CA)

## (undated) DEVICE — SYSTEM TRANSVAGINAL MID-URETHRAL SLING ADVANTAGE FIT: Type: IMPLANTABLE DEVICE | Status: NON-FUNCTIONAL

## (undated) DEVICE — OBTURATOR BLADELESS STANDARD 8MM (6EA/BX)

## (undated) DEVICE — GLOVE BIOGEL SZ 6.5 SURGICAL PF LTX (50PR/BX 4BX/CA)

## (undated) DEVICE — SLEEVE VASO DVT COMPRESSION CALF MED - (10PR/CA)

## (undated) DEVICE — GLOVE BIOGEL PI ULTRATOUCH SZ 6.5 SURGICAL PF LF - (50/BX)

## (undated) DEVICE — FORCEPS PROGRASP DA VINCI 10X'S REUSABLE

## (undated) DEVICE — DRAPE ARM  BOX OF 20

## (undated) DEVICE — SUTURE 2-0 STRATAFIX SPIRAL PDS SH (12EA/BX)

## (undated) DEVICE — SET TUBING PNEUMOCLEAR HIGH FLOW SMOKE EVACUATION (10EA/BX)

## (undated) DEVICE — SLEEVE VASO CALF MED - (10PR/CA)

## (undated) DEVICE — SET SUCTION/IRRIGATION WITH DISPOSABLE TIP (6/CA )PART #0250-070-520 IS A SUB

## (undated) DEVICE — GLOVE BIOGEL PI INDICATOR SZ 7.5 SURGICAL PF LF -(50/BX 4BX/CA)

## (undated) DEVICE — MANIFOLD NEPTUNE 1 PORT (20/PK)

## (undated) DEVICE — SUTURE 4-0 MONOCRYL PLUS PS-2 - 27 INCH (36/BX)

## (undated) DEVICE — SENSOR OXIMETER ADULT SPO2 RD SET (20EA/BX)

## (undated) DEVICE — COVER TIP ENDOWRIST HOT SHEAR - (10EA/BX) DA VINCI

## (undated) DEVICE — SUTURE 2-0 VICRYL PLUS SH - 8 X 18 INCH (12/BX)

## (undated) DEVICE — SEAL 5MM-8MM UNIVERSAL  BOX OF 10

## (undated) DEVICE — MASK OXYGEN VNYL ADLT MED CONC WITH 7 FOOT TUBING - (50EA/CA)

## (undated) DEVICE — CLOSURE SKIN STRIP 1/2 X 4 IN - (STERI STRIP) (50/BX 4BX/CA)

## (undated) DEVICE — BANDAID SHEER STRIP 3/4 IN (100EA/BX 12BX/CA)

## (undated) DEVICE — CLIP APPLIER LARGE DA VINCI 100X'S REUSABLE

## (undated) DEVICE — SUTURE 4-0 VICRYL PLUS FS-2 - 27 INCH (36/BX)

## (undated) DEVICE — DRAPE COLUMN  BOX OF 20

## (undated) DEVICE — GOWN SURGEONS X-LARGE - DISP. (30/CA)

## (undated) DEVICE — GLOVE BIOGEL PI ULTRATOUCH SZ 8.0 SURGICAL PF LF - (50/BX 4BX/CA)